# Patient Record
Sex: MALE | Race: WHITE | NOT HISPANIC OR LATINO | Employment: FULL TIME | URBAN - METROPOLITAN AREA
[De-identification: names, ages, dates, MRNs, and addresses within clinical notes are randomized per-mention and may not be internally consistent; named-entity substitution may affect disease eponyms.]

---

## 2017-01-20 ENCOUNTER — HOSPITAL ENCOUNTER (OUTPATIENT)
Dept: RADIOLOGY | Facility: MEDICAL CENTER | Age: 42
End: 2017-01-20
Attending: SURGERY
Payer: COMMERCIAL

## 2017-01-20 DIAGNOSIS — K43.9 OTHER VENTRAL HERNIA WITHOUT MENTION OF OBSTRUCTION OR GANGRENE: ICD-10-CM

## 2017-01-20 PROCEDURE — 74177 CT ABD & PELVIS W/CONTRAST: CPT

## 2017-01-24 ENCOUNTER — HOSPITAL ENCOUNTER (OUTPATIENT)
Dept: LAB | Facility: MEDICAL CENTER | Age: 42
End: 2017-01-24
Attending: INTERNAL MEDICINE
Payer: COMMERCIAL

## 2017-01-24 LAB
25(OH)D3 SERPL-MCNC: 22 NG/ML (ref 30–100)
ALBUMIN SERPL BCP-MCNC: 3.9 G/DL (ref 3.2–4.9)
ALBUMIN/GLOB SERPL: 1.3 G/DL
ALP SERPL-CCNC: 83 U/L (ref 30–99)
ALT SERPL-CCNC: 25 U/L (ref 2–50)
ANION GAP SERPL CALC-SCNC: 6 MMOL/L (ref 0–11.9)
APPEARANCE UR: CLEAR
AST SERPL-CCNC: 16 U/L (ref 12–45)
BASOPHILS # BLD AUTO: 0.06 K/UL (ref 0–0.12)
BASOPHILS NFR BLD AUTO: 0.8 % (ref 0–1.8)
BILIRUB SERPL-MCNC: 0.6 MG/DL (ref 0.1–1.5)
BILIRUB UR QL STRIP.AUTO: NEGATIVE
BUN SERPL-MCNC: 16 MG/DL (ref 8–22)
CALCIUM SERPL-MCNC: 9 MG/DL (ref 8.5–10.5)
CHLORIDE SERPL-SCNC: 106 MMOL/L (ref 96–112)
CO2 SERPL-SCNC: 30 MMOL/L (ref 20–33)
COLOR UR AUTO: YELLOW
CREAT SERPL-MCNC: 0.92 MG/DL (ref 0.5–1.4)
CREAT UR-MCNC: 214.2 MG/DL
CREAT UR-MCNC: 214.5 MG/DL
EOSINOPHIL # BLD: 0.1 K/UL (ref 0–0.51)
EOSINOPHIL NFR BLD AUTO: 1.4 % (ref 0–6.9)
EPITHELIAL CELLS 1715: ABNORMAL /HPF
ERYTHROCYTE [DISTWIDTH] IN BLOOD BY AUTOMATED COUNT: 51.9 FL (ref 35.9–50)
EST. AVERAGE GLUCOSE BLD GHB EST-MCNC: 123 MG/DL
FERRITIN SERPL-MCNC: 27.1 NG/ML (ref 22–322)
GLOBULIN SER CALC-MCNC: 3 G/DL (ref 1.9–3.5)
GLUCOSE SERPL-MCNC: 131 MG/DL (ref 65–99)
GLUCOSE UR STRIP.AUTO-MCNC: NEGATIVE MG/DL
HBA1C MFR BLD: 5.9 % (ref 0–5.6)
HCT VFR BLD AUTO: 62.8 % (ref 42–52)
HGB BLD-MCNC: 19.6 G/DL (ref 14–18)
HYALINE CAST   1831: ABNORMAL /LPF
IMM GRANULOCYTES # BLD AUTO: 0.02 K/UL (ref 0–0.11)
IMM GRANULOCYTES NFR BLD AUTO: 0.3 % (ref 0–0.9)
IRON SATN MFR SERPL: 17 % (ref 15–55)
IRON SERPL-MCNC: 78 UG/DL (ref 50–180)
KETONES UR STRIP.AUTO-MCNC: NEGATIVE MG/DL
LEUKOCYTE ESTERASE UR QL STRIP.AUTO: NEGATIVE
LYMPHOCYTES # BLD: 1.78 K/UL (ref 1–4.8)
LYMPHOCYTES NFR BLD AUTO: 24.2 % (ref 22–41)
MAGNESIUM SERPL-MCNC: 2 MG/DL (ref 1.5–2.5)
MCH RBC QN AUTO: 30.2 PG (ref 27–33)
MCHC RBC AUTO-ENTMCNC: 31.2 G/DL (ref 33.7–35.3)
MCV RBC AUTO: 96.6 FL (ref 81.4–97.8)
MICRO URNS: ABNORMAL
MICROALBUMIN UR-MCNC: 37 MG/DL
MICROALBUMIN/CREAT UR: 173 MG/G (ref 0–30)
MONOCYTES # BLD: 0.59 K/UL (ref 0–0.85)
MONOCYTES NFR BLD AUTO: 8 % (ref 0–13.4)
MUCOUS THREADS URNS QL MICRO: ABNORMAL /HPF
NEUTROPHILS # BLD: 4.82 K/UL (ref 1.82–7.42)
NEUTROPHILS NFR BLD AUTO: 65.3 % (ref 44–72)
NITRITE UR QL STRIP.AUTO: NEGATIVE
NRBC # BLD AUTO: 0 K/UL
NRBC BLD-RTO: 0 /100 WBC
PH UR: 6 [PH]
PHOSPHATE SERPL-MCNC: 4.2 MG/DL (ref 2.5–4.5)
PLATELET # BLD AUTO: 255 K/UL (ref 164–446)
PMV BLD AUTO: 10.8 FL (ref 9–12.9)
POTASSIUM SERPL-SCNC: 4.2 MMOL/L (ref 3.6–5.5)
PROT 24H UR-MRATE: 60.7 MG/DL (ref 0–15)
PROT SERPL-MCNC: 6.9 G/DL (ref 6–8.2)
PROT UR QL STRIP: 70 MG/DL
PROT/CREAT UR: 283 MG/G (ref 15–68)
PTH-INTACT SERPL-MCNC: 86.6 PG/ML (ref 14–72)
RBC # BLD AUTO: 6.5 M/UL (ref 4.7–6.1)
RBC #/AREA URNS HPF: ABNORMAL /HPF
RBC UR QL AUTO: NEGATIVE
SODIUM SERPL-SCNC: 142 MMOL/L (ref 135–145)
SP GR UR STRIP.AUTO: 1.02
TIBC SERPL-MCNC: 465 UG/DL (ref 250–450)
URATE SERPL-MCNC: 9.4 MG/DL (ref 2.5–8.3)
WBC # BLD AUTO: 7.4 K/UL (ref 4.8–10.8)
WBC #/AREA URNS HPF: ABNORMAL /HPF

## 2017-01-24 PROCEDURE — 84156 ASSAY OF PROTEIN URINE: CPT

## 2017-01-24 PROCEDURE — 84550 ASSAY OF BLOOD/URIC ACID: CPT

## 2017-01-24 PROCEDURE — 83970 ASSAY OF PARATHORMONE: CPT

## 2017-01-24 PROCEDURE — 82728 ASSAY OF FERRITIN: CPT

## 2017-01-24 PROCEDURE — 81001 URINALYSIS AUTO W/SCOPE: CPT

## 2017-01-24 PROCEDURE — 83036 HEMOGLOBIN GLYCOSYLATED A1C: CPT

## 2017-01-24 PROCEDURE — 80053 COMPREHEN METABOLIC PANEL: CPT

## 2017-01-24 PROCEDURE — 83735 ASSAY OF MAGNESIUM: CPT

## 2017-01-24 PROCEDURE — 36415 COLL VENOUS BLD VENIPUNCTURE: CPT

## 2017-01-24 PROCEDURE — 85025 COMPLETE CBC W/AUTO DIFF WBC: CPT

## 2017-01-24 PROCEDURE — 83540 ASSAY OF IRON: CPT

## 2017-01-24 PROCEDURE — 82306 VITAMIN D 25 HYDROXY: CPT

## 2017-01-24 PROCEDURE — 83550 IRON BINDING TEST: CPT

## 2017-01-24 PROCEDURE — 84100 ASSAY OF PHOSPHORUS: CPT

## 2017-01-24 PROCEDURE — 82043 UR ALBUMIN QUANTITATIVE: CPT

## 2017-01-24 PROCEDURE — 82570 ASSAY OF URINE CREATININE: CPT

## 2017-01-24 PROCEDURE — 80061 LIPID PANEL: CPT

## 2017-01-25 LAB
CHOLEST SERPL-MCNC: 170 MG/DL (ref 100–199)
HDLC SERPL-MCNC: 34 MG/DL
LDLC SERPL CALC-MCNC: 120 MG/DL
TRIGL SERPL-MCNC: 78 MG/DL (ref 0–149)

## 2017-02-03 DIAGNOSIS — Z01.810 PRE-OPERATIVE CARDIOVASCULAR EXAMINATION: ICD-10-CM

## 2017-02-03 DIAGNOSIS — Z01.812 PRE-OPERATIVE LABORATORY EXAMINATION: ICD-10-CM

## 2017-02-03 LAB — EKG IMPRESSION: NORMAL

## 2017-02-08 ENCOUNTER — HOSPITAL ENCOUNTER (OUTPATIENT)
Facility: MEDICAL CENTER | Age: 42
End: 2017-02-09
Attending: SURGERY | Admitting: SURGERY
Payer: COMMERCIAL

## 2017-02-08 DIAGNOSIS — R09.02 HYPOXIA: ICD-10-CM

## 2017-02-08 DIAGNOSIS — K43.9 OTHER VENTRAL HERNIA WITHOUT MENTION OF OBSTRUCTION OR GANGRENE: ICD-10-CM

## 2017-02-08 LAB — GLUCOSE BLD-MCNC: 138 MG/DL (ref 65–99)

## 2017-02-08 PROCEDURE — 110371 HCHG SHELL REV 272: Performed by: SURGERY

## 2017-02-08 PROCEDURE — 160009 HCHG ANES TIME/MIN: Performed by: SURGERY

## 2017-02-08 PROCEDURE — 502704 HCHG DEVICE, LIGASURE IMPACT: Performed by: SURGERY

## 2017-02-08 PROCEDURE — 700111 HCHG RX REV CODE 636 W/ 250 OVERRIDE (IP)

## 2017-02-08 PROCEDURE — 88305 TISSUE EXAM BY PATHOLOGIST: CPT | Mod: 59

## 2017-02-08 PROCEDURE — 110382 HCHG SHELL REV 271: Performed by: SURGERY

## 2017-02-08 PROCEDURE — 160036 HCHG PACU - EA ADDL 30 MINS PHASE I: Performed by: SURGERY

## 2017-02-08 PROCEDURE — 700101 HCHG RX REV CODE 250

## 2017-02-08 PROCEDURE — 160002 HCHG RECOVERY MINUTES (STAT): Performed by: SURGERY

## 2017-02-08 PROCEDURE — 160029 HCHG SURGERY MINUTES - 1ST 30 MINS LEVEL 4: Performed by: SURGERY

## 2017-02-08 PROCEDURE — A9270 NON-COVERED ITEM OR SERVICE: HCPCS

## 2017-02-08 PROCEDURE — 700102 HCHG RX REV CODE 250 W/ 637 OVERRIDE(OP): Performed by: CLINICAL NURSE SPECIALIST

## 2017-02-08 PROCEDURE — 500887 HCHG PACK, MAJOR BASIN: Performed by: SURGERY

## 2017-02-08 PROCEDURE — G0378 HOSPITAL OBSERVATION PER HR: HCPCS

## 2017-02-08 PROCEDURE — 160041 HCHG SURGERY MINUTES - EA ADDL 1 MIN LEVEL 4: Performed by: SURGERY

## 2017-02-08 PROCEDURE — A9270 NON-COVERED ITEM OR SERVICE: HCPCS | Performed by: CLINICAL NURSE SPECIALIST

## 2017-02-08 PROCEDURE — A4606 OXYGEN PROBE USED W OXIMETER: HCPCS | Performed by: SURGERY

## 2017-02-08 PROCEDURE — C1781 MESH (IMPLANTABLE): HCPCS | Performed by: SURGERY

## 2017-02-08 PROCEDURE — 82962 GLUCOSE BLOOD TEST: CPT

## 2017-02-08 PROCEDURE — 700102 HCHG RX REV CODE 250 W/ 637 OVERRIDE(OP)

## 2017-02-08 PROCEDURE — 501838 HCHG SUTURE GENERAL: Performed by: SURGERY

## 2017-02-08 PROCEDURE — 502240 HCHG MISC OR SUPPLY RC 0272: Performed by: SURGERY

## 2017-02-08 PROCEDURE — 160035 HCHG PACU - 1ST 60 MINS PHASE I: Performed by: SURGERY

## 2017-02-08 PROCEDURE — 160048 HCHG OR STATISTICAL LEVEL 1-5: Performed by: SURGERY

## 2017-02-08 DEVICE — IMPLANTABLE DEVICE: Type: IMPLANTABLE DEVICE | Status: FUNCTIONAL

## 2017-02-08 RX ORDER — CHLORHEXIDINE GLUCONATE ORAL RINSE 1.2 MG/ML
15 SOLUTION DENTAL EVERY 12 HOURS
Status: DISCONTINUED | OUTPATIENT
Start: 2017-02-08 | End: 2017-02-09

## 2017-02-08 RX ORDER — ACETAMINOPHEN 500 MG
1000 TABLET ORAL EVERY 6 HOURS
Status: DISCONTINUED | OUTPATIENT
Start: 2017-02-08 | End: 2017-02-09 | Stop reason: HOSPADM

## 2017-02-08 RX ORDER — SODIUM CHLORIDE, SODIUM LACTATE, POTASSIUM CHLORIDE, CALCIUM CHLORIDE 600; 310; 30; 20 MG/100ML; MG/100ML; MG/100ML; MG/100ML
1000 INJECTION, SOLUTION INTRAVENOUS
Status: COMPLETED | OUTPATIENT
Start: 2017-02-08 | End: 2017-02-08

## 2017-02-08 RX ORDER — DOCUSATE SODIUM 100 MG/1
100 CAPSULE, LIQUID FILLED ORAL 2 TIMES DAILY
Status: CANCELLED | OUTPATIENT
Start: 2017-02-08

## 2017-02-08 RX ORDER — OXYCODONE HYDROCHLORIDE 10 MG/1
10 TABLET ORAL
Status: DISCONTINUED | OUTPATIENT
Start: 2017-02-08 | End: 2017-02-09 | Stop reason: HOSPADM

## 2017-02-08 RX ORDER — ONDANSETRON 2 MG/ML
4 INJECTION INTRAMUSCULAR; INTRAVENOUS EVERY 4 HOURS PRN
Status: CANCELLED | OUTPATIENT
Start: 2017-02-08

## 2017-02-08 RX ORDER — CELECOXIB 200 MG/1
200 CAPSULE ORAL 2 TIMES DAILY WITH MEALS
Status: DISCONTINUED | OUTPATIENT
Start: 2017-02-08 | End: 2017-02-09 | Stop reason: HOSPADM

## 2017-02-08 RX ORDER — AMOXICILLIN 250 MG
1 CAPSULE ORAL NIGHTLY
Status: DISCONTINUED | OUTPATIENT
Start: 2017-02-08 | End: 2017-02-09 | Stop reason: HOSPADM

## 2017-02-08 RX ORDER — ACETAMINOPHEN 500 MG
1000 TABLET ORAL EVERY 6 HOURS
Status: CANCELLED | OUTPATIENT
Start: 2017-02-08 | End: 2017-02-13

## 2017-02-08 RX ORDER — CHLORHEXIDINE GLUCONATE ORAL RINSE 1.2 MG/ML
15 SOLUTION DENTAL EVERY 12 HOURS
Status: CANCELLED | OUTPATIENT
Start: 2017-02-08

## 2017-02-08 RX ORDER — DOCUSATE SODIUM 100 MG/1
100 CAPSULE, LIQUID FILLED ORAL 2 TIMES DAILY
Status: DISCONTINUED | OUTPATIENT
Start: 2017-02-08 | End: 2017-02-09 | Stop reason: HOSPADM

## 2017-02-08 RX ORDER — SODIUM CHLORIDE, SODIUM LACTATE, POTASSIUM CHLORIDE, CALCIUM CHLORIDE 600; 310; 30; 20 MG/100ML; MG/100ML; MG/100ML; MG/100ML
INJECTION, SOLUTION INTRAVENOUS CONTINUOUS
Status: DISCONTINUED | OUTPATIENT
Start: 2017-02-08 | End: 2017-02-09 | Stop reason: HOSPADM

## 2017-02-08 RX ORDER — OXYCODONE HCL 5 MG/5 ML
SOLUTION, ORAL ORAL
Status: COMPLETED
Start: 2017-02-08 | End: 2017-02-08

## 2017-02-08 RX ORDER — LIDOCAINE HYDROCHLORIDE 10 MG/ML
INJECTION, SOLUTION INFILTRATION; PERINEURAL
Status: COMPLETED
Start: 2017-02-08 | End: 2017-02-08

## 2017-02-08 RX ORDER — HYDROMORPHONE HYDROCHLORIDE 2 MG/ML
1 INJECTION, SOLUTION INTRAMUSCULAR; INTRAVENOUS; SUBCUTANEOUS
Status: DISCONTINUED | OUTPATIENT
Start: 2017-02-08 | End: 2017-02-09 | Stop reason: HOSPADM

## 2017-02-08 RX ORDER — OXYCODONE HYDROCHLORIDE 5 MG/1
5 TABLET ORAL
Status: DISCONTINUED | OUTPATIENT
Start: 2017-02-08 | End: 2017-02-09 | Stop reason: HOSPADM

## 2017-02-08 RX ORDER — CELECOXIB 200 MG/1
200 CAPSULE ORAL 2 TIMES DAILY WITH MEALS
Status: CANCELLED | OUTPATIENT
Start: 2017-02-08 | End: 2017-02-13

## 2017-02-08 RX ADMIN — FENTANYL CITRATE 25 MCG: 50 INJECTION, SOLUTION INTRAMUSCULAR; INTRAVENOUS at 14:45

## 2017-02-08 RX ADMIN — SODIUM CHLORIDE, SODIUM LACTATE, POTASSIUM CHLORIDE, CALCIUM CHLORIDE 1000 ML: 600; 310; 30; 20 INJECTION, SOLUTION INTRAVENOUS at 11:33

## 2017-02-08 RX ADMIN — FENTANYL CITRATE 25 MCG: 50 INJECTION, SOLUTION INTRAMUSCULAR; INTRAVENOUS at 15:00

## 2017-02-08 RX ADMIN — OXYCODONE HYDROCHLORIDE 10 MG: 10 TABLET ORAL at 21:55

## 2017-02-08 RX ADMIN — FENTANYL CITRATE 25 MCG: 50 INJECTION, SOLUTION INTRAMUSCULAR; INTRAVENOUS at 16:10

## 2017-02-08 RX ADMIN — OXYCODONE HYDROCHLORIDE 10 MG: 10 TABLET ORAL at 18:46

## 2017-02-08 RX ADMIN — LIDOCAINE HYDROCHLORIDE: 10 INJECTION, SOLUTION INFILTRATION; PERINEURAL at 11:00

## 2017-02-08 RX ADMIN — FENTANYL CITRATE 25 MCG: 50 INJECTION, SOLUTION INTRAMUSCULAR; INTRAVENOUS at 14:50

## 2017-02-08 RX ADMIN — CELECOXIB 200 MG: 200 CAPSULE ORAL at 18:46

## 2017-02-08 RX ADMIN — ACETAMINOPHEN 1000 MG: 500 TABLET, FILM COATED ORAL at 18:46

## 2017-02-08 RX ADMIN — OXYCODONE HYDROCHLORIDE 10 MG: 5 SOLUTION ORAL at 15:00

## 2017-02-08 ASSESSMENT — PAIN SCALES - GENERAL
PAINLEVEL_OUTOF10: 5
PAINLEVEL_OUTOF10: 0
PAINLEVEL_OUTOF10: 5
PAINLEVEL_OUTOF10: 7
PAINLEVEL_OUTOF10: 7
PAINLEVEL_OUTOF10: 5
PAINLEVEL_OUTOF10: 8
PAINLEVEL_OUTOF10: 7
PAINLEVEL_OUTOF10: 7
PAINLEVEL_OUTOF10: 1
PAINLEVEL_OUTOF10: 5
PAINLEVEL_OUTOF10: 7
PAINLEVEL_OUTOF10: 5
PAINLEVEL_OUTOF10: 7

## 2017-02-08 ASSESSMENT — LIFESTYLE VARIABLES
ON A TYPICAL DAY WHEN YOU DRINK ALCOHOL HOW MANY DRINKS DO YOU HAVE: 2
AVERAGE NUMBER OF DAYS PER WEEK YOU HAVE A DRINK CONTAINING ALCOHOL: 2
TOTAL SCORE: 0
CONSUMPTION TOTAL: NEGATIVE
TOTAL SCORE: 0
HAVE PEOPLE ANNOYED YOU BY CRITICIZING YOUR DRINKING: NO
TOTAL SCORE: 0
HOW MANY TIMES IN THE PAST YEAR HAVE YOU HAD 5 OR MORE DRINKS IN A DAY: 0
ALCOHOL_USE: YES
EVER_SMOKED: NEVER
EVER_SMOKED: NEVER
HAVE YOU EVER FELT YOU SHOULD CUT DOWN ON YOUR DRINKING: NO
EVER FELT BAD OR GUILTY ABOUT YOUR DRINKING: NO
EVER HAD A DRINK FIRST THING IN THE MORNING TO STEADY YOUR NERVES TO GET RID OF A HANGOVER: NO

## 2017-02-08 ASSESSMENT — COPD QUESTIONNAIRES
DO YOU EVER COUGH UP ANY MUCUS OR PHLEGM?: YES, A FEW DAYS A WEEK OR MONTH
COPD SCREENING SCORE: 2
HAVE YOU SMOKED AT LEAST 100 CIGARETTES IN YOUR ENTIRE LIFE: NO/DON'T KNOW
DURING THE PAST 4 WEEKS HOW MUCH DID YOU FEEL SHORT OF BREATH: SOME OF THE TIME

## 2017-02-08 NOTE — OP REPORT
DATE OF OPERATION: 2/8/2017     PREOPERATIVE DIAGNOSIS: Midline Ventral Hernia  Incarcerated    POSTOPERATIVE DIAGNOSIS: Incarcerated omentum, ischemic abdominal skin,  Ventral hernia    PROCEDURE:  Ventral Hernia Repair with Mesh, Omentectomy, abdominoplasty with resection of ischemic skin and sub-q    SURGEON: Daniel De Oliveira    ASSISTANT: ARUN Bell    ANESTHESIOLOGIST: Hesham Solomon    ANESTHESIA: General      INDICATIONS: The patient is a 41 y.o. male with a symptomatic ventral hernia.   Procedure, alternatives and risks were discussed with the patient or guardian.  We specifically discussed risk of bleeding, infection, injury to intestines, mesh erosion, hernia recurrence.  Questions were answered and they wished to proceed.    OPERATION:  The abdomen was prepped and draped in sterile fashion.  A midline   incision was made.  The incarcerated hernia was  from the   subcutaneous tissues and subsequently reduced after the incarcerated omentum was resected using Ligasure energy device.    The fascia was then elevated   and the preperitoneal space developed.  Once an adequate space had been   developed, a Ventralex mesh (Bard) was placed.  The mesh lay evenly and   flat against the fascia.  The fascia was then closed over the mesh using   interrupted 0 Mersilene suture.  The wound was irrigated.  There was no active   hemorrhage. After  skin from the hernia sac , a large portion was clearly ischemic .  This was resected back to healthy skin and subcutaneous tissue sharply.    Sponge and needle counts correct x2.  The subcutaneous tissues   were closed using interrupted 3-0 Vicryl and 2-0 in multiple layers. Skin was closed using a running 4-0   Monocryl subcuticular suture.  Patient tolerated the procedure well; was   taken to recovery room in stable condition.    ESTIMATED BLOOD LOSS:  Minimal.    SPECIMENS TO PATHOLOGY:  None.    CONDITION TO PAR:  Stable.        ____________________________________     DAVID HITCHCOCK MD        DD: 2/8/2017  2:34 PM

## 2017-02-08 NOTE — IP AVS SNAPSHOT
" Home Care Instructions                                                                                                                  Name:Lionel Zacarias  Medical Record Number:[id number]  CSN: [id number]    YOB: 1975   Age: 41 y.o.  Sex: male  HT:1.905 m (6' 3\") WT: 209.1 kg (460 lb 15.7 oz)          Admit Date: 2/8/2017     Discharge Date:   Today's Date: 2/9/2017  Attending Doctor:  Daniel De Oliveira M.D.                  Allergies:  Morphine            Discharge Instructions       Discharge Instructions    Discharged to home by car with relative. Discharged via wheelchair, hospital escort: Yes.  Special equipment needed: Oxygen    Be sure to schedule a follow-up appointment with your primary care doctor or any specialists as instructed.     Discharge Plan:   Diet Plan: Discussed  Activity Level: Discussed  Confirmed Follow up Appointment: Patient to Call and Schedule Appointment  Confirmed Symptoms Management: Discussed  Medication Reconciliation Updated: Yes  Influenza Vaccine Indication: Patient Refuses    I understand that a diet low in cholesterol, fat, and sodium is recommended for good health. Unless I have been given specific instructions below for another diet, I accept this instruction as my diet prescription.   Other diet: Regular diet as tolerated  Special Instructions:  Monitor for signs and symptoms of infection (fever, chills, nausea, vomiting)  Monitor incision for swelling, redness, or drainage    1.  Call or seek medical attention if questions or concerns arise   2.  Follow up with primary care provider within one weeks time for hypoxia and home oxygen use   3.  Follow up with Dr. Daniel De Oliveira, surgery, within one weeks time, as needed, if symptoms worsen and for wound check   4.  No contact sports, heavy lifting, or strenuous activities until cleared by outpatient provider   5. No swimming, hot tubs, baths or wound submersion. May shower   6.  No operation of machinery or motorized " vehicles under the influence of narcotics   7. No alcohol use under the influence of narcotics   8. Seek immediate medical attention for signs and/or symptoms of infection, respiratory decompensation, and/or   New or worsening abdominal pain    Laparoscopic Ventral Hernia Repair, Care After  Refer to this sheet in the next few weeks. These instructions provide you with information on caring for yourself after your procedure. Your caregiver may also give you more specific instructions. Your treatment has been planned according to current medical practices, but problems sometimes occur. Call your caregiver if you have any problems or questions after your procedure.   HOME CARE INSTRUCTIONS   · Only take over-the-counter or prescription medicines as directed by your caregiver. If antibiotic medicines are prescribed, take them as directed. Finish them even if you start to feel better.  · Always wash your hands before touching your abdomen.  · Take your bandages (dressings) off after 48 hours or as directed by your caregiver. You may have skin adhesive strips or skin glue over the surgical cuts (incisions). Do not take the strips off or peel the glue off. These will fall off on their own.  · Take showers once your caregiver approves. Until then, only take sponge baths. Do not take tub baths or go swimming until your caregiver approves.  · Check your incision area every day for swelling, redness, warmth, and blood or fluid leaking from the incision. These are signs of infection. Wash your hands before you check.  · Hold a pillow over your abdomen when you cough or sneeze to help ease pain.  · Eat foods that are high in fiber, such as whole grains, fruits, and vegetables. Fiber helps prevent difficult bowel movements (constipation).  · Drink enough fluids to keep your urine clear or pale yellow.  · Rest and lessen activity for 4-5 days after the surgery. You may take short walks if your caregiver approves. Do not drive  until approved by your caregiver.  · Do not lift anything heavy, participate in sports, or have sexual intercourse for 6-8 weeks or until your caregiver approves.    · Ask your caregiver when you can return to work.  · Keep all follow-up appointments.  SEEK MEDICAL CARE IF:   1. You have pain even after taking pain medicine.  2. You have not had a bowel movement in 3 days.  3. You have cramps or are nauseous.  SEEK IMMEDIATE MEDICAL CARE IF:   1. You have pain or swelling that is getting worse.  2. You have redness around your incisions.  3. Your incision is pulling apart.  4. You have blood or fluid leaking from your incisions.  5. You are vomiting.  6. You cannot pass urine.  MAKE SURE YOU:   1. Understand these instructions.  2. Will watch your condition.  3. Will get help right away if you are not doing well or get worse.     This information is not intended to replace advice given to you by your health care provider. Make sure you discuss any questions you have with your health care provider.     Document Released: 12/04/2013 Document Revised: 01/08/2016 Document Reviewed: 12/04/2013  [employer] Patient Education ©2016 [employer]      Depression / Suicide Risk    As you are discharged from this [facility] facility, it is important to learn how to keep safe from harming yourself.    Recognize the warning signs:  · Abrupt changes in personality, positive or negative- including increase in energy   · Giving away possessions  · Change in eating patterns- significant weight changes-  positive or negative  · Change in sleeping patterns- unable to sleep or sleeping all the time   · Unwillingness or inability to communicate  · Depression  · Unusual sadness, discouragement and loneliness  · Talk of wanting to die  · Neglect of personal appearance   · Rebelliousness- reckless behavior  · Withdrawal from people/activities they love  · Confusion- inability to concentrate     If you or a loved one observes  any of these behaviors or has concerns about self-harm, here's what you can do:  · Talk about it- your feelings and reasons for harming yourself  · Remove any means that you might use to hurt yourself (examples: pills, rope, extension cords, firearm)  · Get professional help from the community (Mental Health, Substance Abuse, psychological counseling)  · Do not be alone:Call your Safe Contact- someone whom you trust who will be there for you.  · Call your local CRISIS HOTLINE 583-6622 or 100-881-0868  · Call your local Children's Mobile Crisis Response Team [place] (305) 689-1239 or www.Compliance 360  · Call the toll free National Suicide Prevention Hotlines   · National Suicide Prevention Lifeline 131-642-FFGW (7807)  · [employer] Hope Line Network 800-SUICIDE (259-6362)          Follow-up Information     1. Follow up with Daniel De Oliveira M.D.. Go in 1 week.    Specialty:  Surgery    Why:  As needed, If symptoms worsen, For wound re-check    Contact information    [address] [facility] [address]  [address]  [place] [id number] [phone]          2. Follow up with Austen Saunders M.D..    Specialty:  Family Medicine    Why:  As needed, If symptoms worsen, and for hypoxia/home oxygen use    Contact information    [address] [place] [address] [address]  [address]  [employer] [place] [id number] [phone]           Discharge Medication Instructions:    Below are the medications your physician expects you to take upon discharge:    Review all your home medications and newly ordered medications with your doctor and/or pharmacist. Follow medication instructions as directed by your doctor and/or pharmacist.    Please keep your medication list with you and share with your physician.               Medication List      START taking these medications        Instructions    oxycodone immediate release 10 MG immediate release tablet   Last time this was given:  10 mg on 2/9/2017  1:55 PM   Commonly known as:  ROXICODONE    Take 1 Tab by mouth every four hours as needed  (Severe Pain (NRS Pain Scale 7-10; CPOT Pain Scale 6-8)).   Dose:  10 mg         CONTINUE taking these medications        Instructions    allopurinol 100 MG Tabs   Commonly known as:  ZYLOPRIM    Take 100 mg by mouth every day.   Dose:  100 mg       AMLODIPINE BESYLATE PO    Take 1 Tab by mouth every evening.   Dose:  1 Tab       LOSARTAN POTASSIUM PO    Take 1 Tab by mouth every day.   Dose:  1 Tab         STOP taking these medications     ADVIL PM PO       TYLENOL PM EXTRA STRENGTH  MG/30ML Liqd   Generic drug:  Diphenhydramine-APAP (sleep)               Instructions           Diet / Nutrition:    Follow any diet instructions given to you by your doctor or the dietician, including how much salt (sodium) you are allowed each day.    If you are overweight, talk to your doctor about a weight reduction plan.    Activity:    Remain physically active following your doctor's instructions about exercise and activity.    Rest often.     Any time you become even a little tired or short of breath, SIT DOWN and rest.    Worsening Symptoms:    Report any of the following signs and symptoms to the doctor's office immediately:    *Pain of jaw, arm, or neck  *Chest pain not relieved by medication                               *Dizziness or loss of consciousness  *Difficulty breathing even when at rest   *More tired than usual                                       *Bleeding drainage or swelling of surgical site  *Swelling of feet, ankles, legs or stomach                 *Fever (>100ºF)  *Pink or blood tinged sputum  *Weight gain (3lbs/day or 5lbs /week)           *Shock from internal defibrillator (if applicable)  *Palpitations or irregular heartbeats                *Cool and/or numb extremities    Stroke Awareness    Common Risk Factors for Stroke include:    Age  Atrial Fibrillation  Carotid Artery Stenosis  Diabetes Mellitus  Excessive alcohol consumption  High blood pressure  Overweight   Physical  inactivity  Smoking    Warning signs and symptoms of a stroke include:    *Sudden numbness or weakness of the face, arm or leg (especially on one side of the body).  *Sudden confusion, trouble speaking or understanding.  *Sudden trouble seeing in one or both eyes.  *Sudden trouble walking, dizziness, loss of balance or coordination.Sudden severe headache with no known cause.    It is very important to get treatment quickly when a stroke occurs. If you experience any of the above warning signs, call 911 immediately.                   Disclaimer         Quit Smoking / Tobacco Use:    I understand the use of any tobacco products increases my chance of suffering from future heart disease or stroke and could cause other illnesses which may shorten my life. Quitting the use of tobacco products is the single most important thing I can do to improve my health. For further information on smoking / tobacco cessation call a Toll Free Quit Line at 1-505.551.5574 (*National Cancer [place]) or 1-204.448.6569 (American Lung Association) or you can access the web based program at www.lungPeppercorn.org.    [place] Tobacco Users Help Line:  (535) 937-8936       Toll Free: 1-981.964.2433  Quit Tobacco Program [facility] (262)627-3723    Crisis Hotline:    [place] Crisis Hotline:  5-069-FEBSKWH or 1-780.112.4035    [place] Crisis Hotline:    1-748.867.2131 or 247-239-0458    Discharge Survey:   Thank you for choosing [facility]. We hope we did everything we could to make your hospital stay a pleasant one. You may be receiving a phone survey and we would appreciate your time and participation in answering the questions. Your input is very valuable to us in our efforts to improve our service to our patients and their families.        My signature on this form indicates that:    1. I have reviewed and understand the above information.  2. My questions regarding this information have been answered to my  satisfaction.  3. I have formulated a plan with my discharge nurse to obtain my prescribed medications for home.                  Disclaimer         __________________________________                     __________       ________                       Patient Signature                                                 Date                    Time

## 2017-02-08 NOTE — OR SURGEON
Immediate Post-Operative Note      PreOp Diagnosis: Ventral hernia    PostOp Diagnosis: Ventral hernia, incarcerated omentum    Procedure(s):  VENTRAL HERNIA REPAIR W/MESH - Wound Class: Clean  OMENTECTOMY - Wound Class: Clean    Surgeon(s):  Daniel De Oliveira M.D.    Anesthesiologist/Type of Anesthesia:  Anesthesiologist: Alfonso Solomon M.D./General    Surgical Staff:  Assistant: ROSITA Herrera  Circulator: Jina Cottrell  Scrub Person: Patricia Lord  Count [place]: Rosita Gooden R.N.    Specimen: Omentum     Estimated Blood Loss: Minimal    Findings: Incarcerated ventral hernia    Complications: None        2/8/2017 2:25 PM Khadra Bell

## 2017-02-08 NOTE — IP AVS SNAPSHOT
2/9/2017          Lionel Zacarias  [address] [address]    Dear Lionel:    [facility] wants to ensure your discharge home is safe and you or your loved ones have had all your questions answered regarding your care after you leave the hospital.    You may receive a telephone call within two days of your discharge.  This call is to make certain you understand your discharge instructions as well as ensure we provided you with the best care possible during your stay with us.     The call will only last approximately 3-5 minutes and will be done by a nurse.    Once again, we want to ensure your discharge home is safe and that you have a clear understanding of any next steps in your care.  If you have any questions or concerns, please do not hesitate to contact us, we are here for you.  Thank you for choosing [facility] for your healthcare needs.    Sincerely,    Chacorta Aly    [facility] [place]

## 2017-02-08 NOTE — IP AVS SNAPSHOT
Updox Access Code: [email or web address]  Expires: 3/3/2017 12:19 PM    Updox  A secure, online tool to manage your health information     [employer]’s Updox® is a secure, online tool that connects you to your personalized health information from the privacy of your home -- day or night - making it very easy for you to manage your healthcare. Once the activation process is completed, you can even access your medical information using the Updox myriam, which is available for free in the Apple Myriam store or Google Play store.     Updox provides the following levels of access (as shown below):   My Chart Features   [facility] Primary Care Doctor [facility]  Specialists [facility]  Urgent  Care Non-[facility]  Primary Care  Doctor   Email your healthcare team securely and privately 24/7 X X X X   Manage appointments: schedule your next appointment; view details of past/upcoming appointments X      Request prescription refills. X      View recent personal medical records, including lab and immunizations X X X X   View health record, including health history, allergies, medications X X X X   Read reports about your outpatient visits, procedures, consult and ER notes X X X X   See your discharge summary, which is a recap of your hospital and/or ER visit that includes your diagnosis, lab results, and care plan. X X       How to register for Updox:  1. Go to  [email or web address].  2. Click on the Sign Up Now box, which takes you to the New Member Sign Up page. You will need to provide the following information:  a. Enter your Updox Access Code exactly as it appears at the top of this page. (You will not need to use this code after you’ve completed the sign-up process. If you do not sign up before the expiration date, you must request a new code.)   b. Enter your date of birth.   c. Enter your home email address.   d. Click Submit, and follow the next screen’s instructions.  3. Create a Updox ID. This will be your Updox  login ID and cannot be changed, so think of one that is secure and easy to remember.  4. Create a [employer] password. You can change your password at any time.  5. Enter your Password Reset Question and Answer. This can be used at a later time if you forget your password.   6. Enter your e-mail address. This allows you to receive e-mail notifications when new information is available in [employer].  7. Click Sign Up. You can now view your health information.    For assistance activating your [employer] account, call [phone]

## 2017-02-08 NOTE — IP AVS SNAPSHOT
" <p align=\"LEFT\"><IMG SRC=\"//[email or web address]$/[email or web address]\" alt=\"Image\" WIDTH=\"50%\" HEIGHT=\"200\" BORDER=\"\"></p>                   Name:Lionel Zacarias  Medical Record Number:[id number]  CSN: [id number]    YOB: 1975   Age: 41 y.o.  Sex: male  HT:1.905 m (6' 3\") WT: 209.1 kg (460 lb 15.7 oz)          Admit Date: 2/8/2017     Discharge Date:   Today's Date: 2/9/2017  Attending Doctor:  Daniel De Oliveira M.D.                  Allergies:  Morphine          Follow-up Information     1. Follow up with Daniel De Oliveira M.D.. Go in 1 week.    Specialty:  Surgery    Why:  As needed, If symptoms worsen, For wound re-check    Contact information    [address] [facility] [address]  [address]  [place] [id number] [phone]          2. Follow up with Austen Saunders M.D..    Specialty:  Family Medicine    Why:  As needed, If symptoms worsen, and for hypoxia/home oxygen use    Contact information    [address] [place] [address] [address]  [address]  [place] [id number]  [phone]           Medication List      Take these Medications        Instructions    allopurinol 100 MG Tabs   Commonly known as:  ZYLOPRIM    Take 100 mg by mouth every day.   Dose:  100 mg       AMLODIPINE BESYLATE PO    Take 1 Tab by mouth every evening.   Dose:  1 Tab       LOSARTAN POTASSIUM PO    Take 1 Tab by mouth every day.   Dose:  1 Tab       oxycodone immediate release 10 MG immediate release tablet   Commonly known as:  ROXICODONE    Take 1 Tab by mouth every four hours as needed (Severe Pain (NRS Pain Scale 7-10; CPOT Pain Scale 6-8)).   Dose:  10 mg         "

## 2017-02-09 VITALS
HEART RATE: 73 BPM | DIASTOLIC BLOOD PRESSURE: 53 MMHG | SYSTOLIC BLOOD PRESSURE: 100 MMHG | TEMPERATURE: 97 F | HEIGHT: 75 IN | WEIGHT: 315 LBS | RESPIRATION RATE: 18 BRPM | OXYGEN SATURATION: 82 % | BODY MASS INDEX: 39.17 KG/M2

## 2017-02-09 PROBLEM — R09.02 HYPOXIA: Status: ACTIVE | Noted: 2017-02-09

## 2017-02-09 LAB
GLUCOSE BLD-MCNC: 121 MG/DL (ref 65–99)
GLUCOSE BLD-MCNC: 138 MG/DL (ref 65–99)

## 2017-02-09 PROCEDURE — 700102 HCHG RX REV CODE 250 W/ 637 OVERRIDE(OP): Performed by: CLINICAL NURSE SPECIALIST

## 2017-02-09 PROCEDURE — 700111 HCHG RX REV CODE 636 W/ 250 OVERRIDE (IP): Performed by: CLINICAL NURSE SPECIALIST

## 2017-02-09 PROCEDURE — A9270 NON-COVERED ITEM OR SERVICE: HCPCS | Performed by: CLINICAL NURSE SPECIALIST

## 2017-02-09 PROCEDURE — 82962 GLUCOSE BLOOD TEST: CPT

## 2017-02-09 PROCEDURE — G0378 HOSPITAL OBSERVATION PER HR: HCPCS

## 2017-02-09 PROCEDURE — 700112 HCHG RX REV CODE 229: Performed by: CLINICAL NURSE SPECIALIST

## 2017-02-09 RX ORDER — OXYCODONE HYDROCHLORIDE 10 MG/1
10 TABLET ORAL EVERY 4 HOURS PRN
Qty: 30 TAB | Refills: 0 | Status: SHIPPED | OUTPATIENT
Start: 2017-02-09 | End: 2017-06-23

## 2017-02-09 RX ADMIN — ACETAMINOPHEN 1000 MG: 500 TABLET, FILM COATED ORAL at 00:08

## 2017-02-09 RX ADMIN — CELECOXIB 200 MG: 200 CAPSULE ORAL at 09:27

## 2017-02-09 RX ADMIN — ENOXAPARIN SODIUM 40 MG: 100 INJECTION SUBCUTANEOUS at 09:28

## 2017-02-09 RX ADMIN — OXYCODONE HYDROCHLORIDE 5 MG: 10 TABLET ORAL at 05:21

## 2017-02-09 RX ADMIN — ACETAMINOPHEN 1000 MG: 500 TABLET, FILM COATED ORAL at 11:28

## 2017-02-09 RX ADMIN — OXYCODONE HYDROCHLORIDE 10 MG: 10 TABLET ORAL at 09:28

## 2017-02-09 RX ADMIN — OXYCODONE HYDROCHLORIDE 10 MG: 10 TABLET ORAL at 13:55

## 2017-02-09 RX ADMIN — DOCUSATE SODIUM 100 MG: 100 CAPSULE ORAL at 09:27

## 2017-02-09 RX ADMIN — ACETAMINOPHEN 1000 MG: 500 TABLET, FILM COATED ORAL at 05:19

## 2017-02-09 RX ADMIN — SODIUM CHLORIDE, POTASSIUM CHLORIDE, SODIUM LACTATE AND CALCIUM CHLORIDE: 600; 310; 30; 20 INJECTION, SOLUTION INTRAVENOUS at 00:12

## 2017-02-09 ASSESSMENT — ENCOUNTER SYMPTOMS
ABDOMINAL PAIN: 1
VOMITING: 0
SHORTNESS OF BREATH: 1
SPEECH CHANGE: 0
FEVER: 0
ROS GI COMMENTS: INCISIONAL TENDERNESS
CHILLS: 0
NAUSEA: 0

## 2017-02-09 ASSESSMENT — PAIN SCALES - GENERAL
PAINLEVEL_OUTOF10: 4
PAINLEVEL_OUTOF10: 2
PAINLEVEL_OUTOF10: 4

## 2017-02-09 NOTE — PROGRESS NOTES
"  Trauma/Surgical Progress Note    Author: Tiburcio Chi Date & Time created: 2/9/2017   11:11 AM     Interval Events:    S/P Ventral hernia repair with mesh, omentectomy, abdominoplasty with resection of ischemic skin   Post operative incision intact. Adequate pain control. Tolerating oral diet. Ambulatory.  Hypoxic, requiring supplemental oxygen. Arrange home oxygen   Counseled    Review of Systems   Constitutional: Negative for fever and chills.   Respiratory: Positive for shortness of breath.         Supplemental oxygen    Gastrointestinal: Positive for abdominal pain. Negative for nausea and vomiting.        Incisional tenderness   Genitourinary: Negative for dysuria.   Neurological: Negative for speech change.     Hemodynamics:  Blood pressure 106/62, pulse 68, temperature 36.2 °C (97.2 °F), resp. rate 18, height 1.905 m (6' 3\"), weight 209.1 kg (460 lb 15.7 oz), SpO2 99 %.     Respiratory:    Respiration: 18, Pulse Oximetry: 99 %, O2 Daily Delivery Respiratory : OxyMask     Work Of Breathing / Effort: Mild  RUL Breath Sounds: Clear, RML Breath Sounds: Clear, RLL Breath Sounds: Diminished, JEET Breath Sounds: Clear, LLL Breath Sounds: Diminished  Fluids:    Intake/Output Summary (Last 24 hours) at 02/09/17 1111  Last data filed at 02/09/17 0800   Gross per 24 hour   Intake   3342 ml   Output    600 ml   Net   2742 ml     Admit Weight: (!) 215.2 kg (474 lb 6.9 oz)  Current     Physical Exam   Constitutional: He is oriented to person, place, and time. He appears well-developed and well-nourished. No distress.   HENT:   Head: Normocephalic.   Eyes: Pupils are equal, round, and reactive to light.   Cardiovascular: Normal rate.    Pulmonary/Chest: He exhibits no tenderness.   Hypoxia, requiring supplemental oxygen     Abdominal:   Obese  Post operative incision intact, abdominal binder   Musculoskeletal:   Ambulatory    Neurological: He is alert and oriented to person, place, and time.   Skin: Skin is warm and dry. "   Nursing note and vitals reviewed.      Medical Decision Making/Problem List:    Active Hospital Problems    Diagnosis   • Hypoxia [R09.02]   • Other ventral hernia without mention of obstruction or gangrene [K43.9]     Core Measures & Quality Metrics:  Labs reviewed, Medications reviewed and Radiology images reviewed  Giron catheter: No Giron      DVT Prophylaxis: Enoxaparin (Lovenox)  DVT prophylaxis - mechanical: Not indicated at this time, ambulatory      Assessed for rehab: Patient returned to prior level of function, rehabilitation not indicated at this time    WENDI Score  Discussed patient condition with RN, Patient and trauma surgery, Dr. Daniel De Olievira  .

## 2017-02-09 NOTE — DISCHARGE PLANNING
Received DME choice from [facility]) at 1215.  DME referral for oxygen sent to [facility] at 1220.

## 2017-02-09 NOTE — FACE TO FACE
Face to Face Note  -  Durable Medical Equipment    ROSITA Noriega - NPI: [id number]  I certify that this patient is under my care and that they had a durable medical equipment(DME)face to face encounter by myself that meets the physician DME face-to-face encounter requirements with this patient on:    Date of encounter:   Patient:                    MRN:                       YOB: 2019  Lionel Zacarias  [id number]  1975     The encounter with the patient was in whole, or in part, for the following medical condition, which is the primary reason for durable medical equipment:  Post-Op Surgery    I certify that, based on my findings, the following durable medical equipment is medically necessary:  Oxygen.    HOME O2 Saturation Measurements:(Values must be present for Home Oxygen orders)  Room air sat at rest: 83  Room air sat with amb: 71  With liters of O2: 4, O2 sat at rest with O2: 93  With Liters of O2: 5, O2 sat with amb with O2 : 88  Is the patient mobile?: Yes    My Clinical findings support the need for the above equipment due to:  Hypoxia, Wound/Incision    Supporting Symptoms:

## 2017-02-09 NOTE — PROGRESS NOTES
Discharging Patient home per physician order.  Discharged with Family.  Demonstrated understanding of discharge instructions, follow up appointments, home medications, prescriptions, home care for surgical wound and nursing care instructions.  Ambulating with standby assistance, voiding without difficulty, pain well controlled, tolerating oral medications, home oxygen ordered, pt educated on home oxygen use, tolerating diet.  Educational handouts given and discussed.  Verbalized understanding of discharge instructions and educational handouts.  All questions answered.  Belongings with patient at time of discharge.

## 2017-02-09 NOTE — DIETARY
NUTRITION SERVICES: BMI - Pt with BMI >40 (=57.62 kg/m2). Weight loss counseling not appropriate in acute care setting. RECOMMEND - Referral to outpatient nutrition services for weight management after D/C.

## 2017-02-09 NOTE — PROGRESS NOTES
Assumed care at 0700. Received report from night shift RN. Bedside report completed. AOx4.  C/o pain-medicated.  Denies nausea. Abd binder in place. Tolerating diet. +voiding.  IVF infusing. Ambulating with standby assist. Pt call light and belongings within reach, fall precautions in place.

## 2017-02-09 NOTE — CARE PLAN
Problem: Venous Thromboembolism (VTW)/Deep Vein Thrombosis (DVT) Prevention:  Goal: Patient will participate in Venous Thrombosis (VTE)/Deep Vein Thrombosis (DVT)Prevention Measures  Outcome: PROGRESSING AS EXPECTED  SCDs in place. Lovenox is ordered.

## 2017-02-09 NOTE — CARE PLAN
Problem: Respiratory:  Goal: Respiratory status will improve  Outcome: PROGRESSING SLOWER THAN EXPECTED  Pt has to wear 5L O2 via oxy-mask when pt is sleeping due to sleep apnea. O2 sat monitor in place.

## 2017-02-09 NOTE — DISCHARGE PLANNING
Received message from [facility] that [facility] is currently evaluating with the pts insurance and will contact once scheduled for delivery.

## 2017-02-09 NOTE — DISCHARGE PLANNING
BIB self for surgery. He may need oxygen for dc home and outpt sleep study for sleep apnea. Anticipate dc home when medically cleared

## 2017-02-09 NOTE — DISCHARGE INSTRUCTIONS
Discharge Instructions    Discharged to home by car with relative. Discharged via wheelchair, hospital escort: Yes.  Special equipment needed: Oxygen    Be sure to schedule a follow-up appointment with your primary care doctor or any specialists as instructed.     Discharge Plan:   Diet Plan: Discussed  Activity Level: Discussed  Confirmed Follow up Appointment: Patient to Call and Schedule Appointment  Confirmed Symptoms Management: Discussed  Medication Reconciliation Updated: Yes  Influenza Vaccine Indication: Patient Refuses    I understand that a diet low in cholesterol, fat, and sodium is recommended for good health. Unless I have been given specific instructions below for another diet, I accept this instruction as my diet prescription.   Other diet: Regular diet as tolerated  Special Instructions:  Monitor for signs and symptoms of infection (fever, chills, nausea, vomiting)  Monitor incision for swelling, redness, or drainage    1.  Call or seek medical attention if questions or concerns arise   2.  Follow up with primary care provider within one weeks time for hypoxia and home oxygen use   3.  Follow up with Dr. Daniel De Oliveira, surgery, within one weeks time, as needed, if symptoms worsen and for wound check   4.  No contact sports, heavy lifting, or strenuous activities until cleared by outpatient provider   5. No swimming, hot tubs, baths or wound submersion. May shower   6.  No operation of machinery or motorized vehicles under the influence of narcotics   7. No alcohol use under the influence of narcotics   8. Seek immediate medical attention for signs and/or symptoms of infection, respiratory decompensation, and/or   New or worsening abdominal pain    Laparoscopic Ventral Hernia Repair, Care After  Refer to this sheet in the next few weeks. These instructions provide you with information on caring for yourself after your procedure. Your caregiver may also give you more specific instructions. Your  treatment has been planned according to current medical practices, but problems sometimes occur. Call your caregiver if you have any problems or questions after your procedure.   HOME CARE INSTRUCTIONS   · Only take over-the-counter or prescription medicines as directed by your caregiver. If antibiotic medicines are prescribed, take them as directed. Finish them even if you start to feel better.  · Always wash your hands before touching your abdomen.  · Take your bandages (dressings) off after 48 hours or as directed by your caregiver. You may have skin adhesive strips or skin glue over the surgical cuts (incisions). Do not take the strips off or peel the glue off. These will fall off on their own.  · Take showers once your caregiver approves. Until then, only take sponge baths. Do not take tub baths or go swimming until your caregiver approves.  · Check your incision area every day for swelling, redness, warmth, and blood or fluid leaking from the incision. These are signs of infection. Wash your hands before you check.  · Hold a pillow over your abdomen when you cough or sneeze to help ease pain.  · Eat foods that are high in fiber, such as whole grains, fruits, and vegetables. Fiber helps prevent difficult bowel movements (constipation).  · Drink enough fluids to keep your urine clear or pale yellow.  · Rest and lessen activity for 4-5 days after the surgery. You may take short walks if your caregiver approves. Do not drive until approved by your caregiver.  · Do not lift anything heavy, participate in sports, or have sexual intercourse for 6-8 weeks or until your caregiver approves.    · Ask your caregiver when you can return to work.  · Keep all follow-up appointments.  SEEK MEDICAL CARE IF:   1. You have pain even after taking pain medicine.  2. You have not had a bowel movement in 3 days.  3. You have cramps or are nauseous.  SEEK IMMEDIATE MEDICAL CARE IF:   1. You have pain or swelling that is getting  worse.  2. You have redness around your incisions.  3. Your incision is pulling apart.  4. You have blood or fluid leaking from your incisions.  5. You are vomiting.  6. You cannot pass urine.  MAKE SURE YOU:   1. Understand these instructions.  2. Will watch your condition.  3. Will get help right away if you are not doing well or get worse.     This information is not intended to replace advice given to you by your health care provider. Make sure you discuss any questions you have with your health care provider.     Document Released: 12/04/2013 Document Revised: 01/08/2016 Document Reviewed: 12/04/2013  [employer] Interactive Patient Education ©2016 [employer]      Depression / Suicide Risk    As you are discharged from this [facility] facility, it is important to learn how to keep safe from harming yourself.    Recognize the warning signs:  · Abrupt changes in personality, positive or negative- including increase in energy   · Giving away possessions  · Change in eating patterns- significant weight changes-  positive or negative  · Change in sleeping patterns- unable to sleep or sleeping all the time   · Unwillingness or inability to communicate  · Depression  · Unusual sadness, discouragement and loneliness  · Talk of wanting to die  · Neglect of personal appearance   · Rebelliousness- reckless behavior  · Withdrawal from people/activities they love  · Confusion- inability to concentrate     If you or a loved one observes any of these behaviors or has concerns about self-harm, here's what you can do:  · Talk about it- your feelings and reasons for harming yourself  · Remove any means that you might use to hurt yourself (examples: pills, rope, extension cords, firearm)  · Get professional help from the community (Mental Health, Substance Abuse, psychological counseling)  · Do not be alone:Call your Safe Contact- someone whom you trust who will be there for you.  · Call your local CRISIS HOTLINE 651-1238 or  456-389-8104  · Call your local Children's Mobile Crisis Response Team [place] (382) 672-0439 or www.[employer]HearToday.Org  · Call the toll free National Suicide Prevention Hotlines   · National Suicide Prevention Lifeline 643-014-YXFY (4628)  · National [employer] Line Network 800-SUICIDE (194-1113)

## 2017-02-09 NOTE — DISCHARGE PLANNING
Received message that pt will require home O2 for DC. Met with the pt at the bedside, choice form provided and choice faxed to [facility].

## 2017-02-09 NOTE — PROGRESS NOTES
Received report from day shift RN. Pt A&O x 4. C/o pain 7/10. Medicated per MAR. No c/o nausea and vomiting at this time. Pt is tolerating full liquid diet. -BM since pt arrived on unit. BS x 4 normoactive. Midline surgical incision open to air with dermabond, no infection sign noted. Abdominal binder in place. SCDs in place. Pt on bariatric bed. Call light within reach. Bed locked and in lowest position. Plan of care discussed with pt. All needs are met at this time.    Pt educated regarding fall risk and intervention. Pt verbalized understanding and still refusing bed alarm. Pt A&O x4. Pt demonstrates appropriate use of call light to call for assistance. Hourly rounding in place.

## 2017-02-10 NOTE — DISCHARGE SUMMARY
DATE OF ADMISSION:  02/08/2017.    DATE OF DISCHARGE:  02/09/2017.    LENGTH OF STAY:  One day.    DISCHARGE DIAGNOSES:  1.  Ventral hernia without mention of obstruction or gangrene.  2.  Hypoxia.    PROCEDURES: 02/08/2017, procedure performed by Dr. Daniel De Oliveira.   - Ventral hernia repair with mesh omentectomy, abdominoplasty with section   of ischemic skin and subQ.    HISTORY OF PRESENT ILLNESS:  The patient is a 41-year-old male with a   symptomatic ventral hernia.  He proceeded to the hospital on 02/08/2017 for   elective repair.    HOSPITAL COURSE:  The patient underwent a ventral hernia repair with mesh,   omentumectomy, abdominoplasty resection of ischemic skin in subQ on   02/08/2017.  Postoperatively, he did well.  Currently, he is tolerating an   oral diet, his incision is clean, dry, and intact with an abdominal binder in place and  he is ambulatory.    The patient's admission weight was 209.1 kilograms.  He experience postoperative   Hypoxia. However, there may have been a chronic component, given his obesity.    He will be going home on continuous oxygen at 4 liters and he will follow with his   primary care provider.     DISCHARGE PHYSICAL EXAM:  Please see exam dated 02/09/2017.    DISCHARGE MEDICATIONS:  Oxycodone (Roxicodone) 10 mg immediate release,   take 1tablet by mouth every 4 hours as needed for pain, dispensed 30 tablets,   refills 0.    DISPOSITION:  The patient will be discharged home in good condition on   02/09/2017.  He will follow up with his primary care provider, Dr. Austen Saunders for his hypoxia and home oxygen needs.  He will follow with Dr. Daniel De Oliveira, surgery, within 1 week's time as needed and if symptoms worsen   and for wound check, within 1 week's time.  He has been extensively counseled.    All of his questions had been answered.  Special attention was paid to the   signs and symptoms of infection and new or worsening abdominal pain and   respiratory  decompensation and to seek immediate medical attention if these do   develop.  He demonstrates understanding of his discharge instructions and   gives verbal compliance.    Time spent on discharge 45 minutes.       ____________________________________     EDY PUENTES / ROBERTO    DD:  02/09/2017 11:35:22  DT:  02/10/2017 05:26:18    D#:  [id number]  Job#:  [id number]    cc: SAI HENSLEY MD

## 2017-05-01 ENCOUNTER — SLEEP CENTER VISIT (OUTPATIENT)
Dept: SLEEP MEDICINE | Facility: MEDICAL CENTER | Age: 42
End: 2017-05-01
Payer: COMMERCIAL

## 2017-05-01 VITALS
OXYGEN SATURATION: 82 % | HEIGHT: 75 IN | BODY MASS INDEX: 39.17 KG/M2 | WEIGHT: 315 LBS | DIASTOLIC BLOOD PRESSURE: 78 MMHG | SYSTOLIC BLOOD PRESSURE: 130 MMHG | HEART RATE: 96 BPM | RESPIRATION RATE: 18 BRPM

## 2017-05-01 DIAGNOSIS — E66.01 MORBID OBESITY DUE TO EXCESS CALORIES (HCC): ICD-10-CM

## 2017-05-01 DIAGNOSIS — R09.02 HYPOXIA: ICD-10-CM

## 2017-05-01 DIAGNOSIS — G47.33 OBSTRUCTIVE SLEEP APNEA: ICD-10-CM

## 2017-05-01 PROCEDURE — 99204 OFFICE O/P NEW MOD 45 MIN: CPT | Performed by: INTERNAL MEDICINE

## 2017-05-01 RX ORDER — FUROSEMIDE 40 MG/1
40 TABLET ORAL 2 TIMES DAILY
COMMUNITY
Start: 2017-04-10

## 2017-05-01 NOTE — PATIENT INSTRUCTIONS
1. We have scheduled a sleep study  2. We have scheduled an echocardiogram  3. We have scheduled a chest x-ray and pulmonary function testing  4. We have made a referral to a nutritionist  5. We have prescribed oxygen therapy  6. Recommend follow-up after the above testing

## 2017-05-01 NOTE — PROGRESS NOTES
Lionel Zacarias is a 41 y.o. male here for obstructive sleep apnea.  Patient was referred by Dr. Austen Peralta.    History of Present Illness:    The patient is a 41-year-old male with morbid obesity. He comes in for evaluation of possible sleep apnea. He has a brother with sleep apnea and is on a CPAP machine. The patient has gained a significant amount of weight and is currently up to 481 pounds. His history of low oxygen. After hernia surgery in February his oxygen levels were too low and he was admitted to the hospital. He was sent home the next day with oxygen therapy but the patient says he never using oxygen. 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 He says he is very sleepy and he has trouble maintaining his focus and memory. He snores and he stops breathing in his sleep. Sometimes he wakes up with sweats and he has significant nocturia. When he gets up in the morning he is exhausted and tired and he will fall sleep and many sedentary situations. Family members and partners have witnesses apneas. The patient is trying to work on weight loss. He is been evaluated for bariatric surgery but apparently his insurance company will not cover the cost of this procedure. The patient has hypertension. He has no history of heart disease although has chronic lower extremity edema. He also has renal insufficiency. He's never had a heart attack or congestive heart failure per his knowledge. He is a nonsmoker and does not do any recreational drugs. He denies use.    Constitutional:  Negative for fever, chills, sweats, and fatigue.  Eyes:  Negative for eye pain and visual changes.  HENT:  Negative for tinnitus and hoarse voice.  Cardiovascular:  Negative for chest pain, leg swelling, syncope and orthopnea.  Respiratory:  See HPI for pertinent negatives  Sleep:  Positive for somnolence, loud snoring, sleep disturbance due to breathing,  insomnia.  Gastrointestinal:  Negative for dysphagia, nausea and abdominal pain.  Heme/lymph:  Denies easy bruising, blood clots.  Musculoskeletal:  Negative for arthralgias, sore muscles and back pain.  Skin:  Negative for rash and color change.  Neurological:  Negative for headaches, lightheadedness and weakness.  Psychiatric:  Denies depression.    Current Outpatient Prescriptions   Medication Sig Dispense Refill   • furosemide (LASIX) 40 MG Tab      • AMLODIPINE BESYLATE PO Take 1 Tab by mouth every evening.     • LOSARTAN POTASSIUM PO Take 1 Tab by mouth every day.     • allopurinol (ZYLOPRIM) 100 MG Tab Take 100 mg by mouth every day.     • oxycodone immediate release (ROXICODONE) 10 MG immediate release tablet Take 1 Tab by mouth every four hours as needed (Severe Pain (NRS Pain Scale 7-10; CPOT Pain Scale 6-8)). 30 Tab 0     No current facility-administered medications for this visit.       Social History   Substance Use Topics   • Smoking status: Never Smoker    • Smokeless tobacco: Current User     Types: Chew   • Alcohol Use: 3.0 oz/week     6 Cans of beer per week      Comment: 2 per wek       Past Medical History   Diagnosis Date   • GOUT 12/2/2010   • Snoring    • Arthritis      joints   • HTN (hypertension) 12/2/2010 2017 pt states fairly well controlled   • Hypertension        Past Surgical History   Procedure Laterality Date   • Other orthopedic surgery       r hip   • Ventral hernia repair N/A 2/8/2017     Procedure: VENTRAL HERNIA REPAIR W/MESH;  Surgeon: Daniel De Oliveira M.D.;  Location: SURGERY [facility];  Service:    • Omentectomy N/A 2/8/2017     Procedure: OMENTECTOMY;  Surgeon: Daniel De Oliveira M.D.;  Location: SURGERY [facility];  Service:    • Hip replacement, total         Allergies:  Morphine    Family History   Problem Relation Age of Onset   • Heart Disease Mother 55     MI   • Lung Disease Maternal Grandmother      COPD   • Heart Disease Maternal Grandfather      MI   •  "Sleep Apnea Brother        Physical Examination    Filed Vitals:    05/01/17 1355   Height: 1.905 m (6' 3\")   Weight: 218.18 kg (481 lb)   Weight % change since last entry.: 0 %   BP: 130/78   Pulse: 96   BMI (Calculated): 60.12   Resp: 18   Neck circumference: 20       Physical Exam:  Constitutional:  Well developed and well nourished. Morbidly obese  Head:  Normocephalic and atraumatic.  Nose:  Nose normal.  Mouth/Throat:  Oropharynx is clear and moist, no lesions.  Mallampati class IV  Eyes:  Conjunctivae and EOM are normal.  Pupils are equal, round, and reactive to light.  Neck:  Normal range of motion.  Supple.  No JVD. No tracheal deviation.  No thyromegally  Cardiovascular:  Normal rate, regular rhythm, normal heart sounds and intact distal pulses. 2+ pitting edema with chronic venous stasis changes  Pulmonary/Chest:  No accessory muscle use.  No wheezing, rales or rhonchi.  No dullness to percussion, tenderness or deformity.  Abdominal:  Soft.  No ascites.  No Hepatosplenomegally.  Non tender.  Musculoskeletal.  Normal range of motion.  No muscular atrophy.  Lymphadenopathy:  No cervical or supraclavicular adenopathy  Neurological:  Alert and oriented.  Cranial nerves intact.  No focal deficits  Skin:  No rashes or ulcers.  Psyciatric:  Normal mood and affect.      Assessment and Plan:  1. Obstructive sleep apnea  The patient has signs and symptoms of severe obstructive sleep apnea. We will get him scheduled for a sleep study and I suspect he will need BiPAP therapy with oxygen. I'm also ordering an ABG to see if there is evidence of chronic hypercapnia.  - POLYSOMNOGRAPHY, 4 OR MORE; Future    2. Hypoxia  The patients chronic hypoxemia is probably related to his obesity but must also consider the possibility of pulmonary hypertension and untreated sleep apnea as additional etiologies. I have scheduled the patient for pulmonary function testing as well as a chest x-ray and we will do an echocardiogram. We " will set him up with oxygen therapy today before he leaves the office. He was encouraged to use the oxygen 24 hours a day including driving and while at work. The patient indicates that he is willing to comply with this request.  - DME O2 NEW SET UP  - AMB PULMONARY FUNCTION TEST/LAB; Future  - ARTERIAL BLOOD GAS; Future  - DX-CHEST-2 VIEWS; Future  - ECHOCARDIOGRAM COMP W/O CONT; Future    3. Morbid obesity due to excess calories (CMS-HCC)  Weight loss is strongly recommended. I have referred him to a dietitian. He will most likely benefit from bariatric surgery and we will continue to pursue this option.  - REFERRAL TO OTHER          Followup Return for follow up visit with Simba Foster MD, after sleep study, after testing, with PFT, with CXR.

## 2017-05-01 NOTE — MR AVS SNAPSHOT
"        Lionel Greeneidge   2017 2:00 PM   Sleep Center Visit   MRN: [id number]    Department:  [facility]   Dept Phone:  [phone]    Description:  Male : 1975   Provider:  Simba Foster M.D.           Reason for Visit     New Patient Sleep evaluation      Allergies as of 2017     Allergen Noted Reactions    Morphine 2017   Unspecified    Nightmares terrors       You were diagnosed with     Obstructive sleep apnea   [687486]       Hypoxia   [320730]       Morbid obesity due to excess calories (CMS-[facility])   [0921609]         Vital Signs     Blood Pressure Pulse Respirations Height Weight Body Mass Index    130/78 mmHg 96 18 1.905 m (6' 3\") 218.18 kg (481 lb) 60.12 kg/m2    Oxygen Saturation Smoking Status                82% Never Smoker           Basic Information     Date Of Birth Sex Race Ethnicity Preferred Language    1975 Male White Non- English      Your appointments     2017  9:00 PM   Sleep Study Diagnostic with SLEEP TECH   [facility] (--)    [address]  [place] [address]   [phone]            Jul 10, 2017  1:00 PM   Follow UP with Simba Foster M.D.   [facility] (--)    [address]  [place] [address]   [phone]              Problem List              ICD-10-CM Priority Class Noted - Resolved    HTN (hypertension) I10   2010 - Present    GOUT    2010 - Present    Other ventral hernia without mention of obstruction or gangrene K43.9   2017 - Present    Hypoxia R09.02   2017 - Present      Health Maintenance        Date Due Completion Dates    IMM DTaP/Tdap/Td Vaccine (1 - Tdap) 1992 ---            Current Immunizations     No immunizations on file.      Below and/or attached are the medications your provider expects you to take. Review all of your home medications and newly ordered medications with your provider and/or pharmacist. Follow " medication instructions as directed by your provider and/or pharmacist. Please keep your medication list with you and share with your provider. Update the information when medications are discontinued, doses are changed, or new medications (including over-the-counter products) are added; and carry medication information at all times in the event of emergency situations     Allergies:  MORPHINE - Unspecified               Medications  Valid as of: May 01, 2017 -  2:55 PM    Generic Name Brand Name Tablet Size Instructions for use    Allopurinol (Tab) ZYLOPRIM 100 MG Take 100 mg by mouth every day.        AmLODIPine Besylate   Take 1 Tab by mouth every evening.        Furosemide (Tab) LASIX 40 MG         Losartan Potassium   Take 1 Tab by mouth every day.        OxyCODONE HCl (Tab) ROXICODONE 10 MG Take 1 Tab by mouth every four hours as needed (Severe Pain (NRS Pain Scale 7-10; CPOT Pain Scale 6-8)).        .                 Medicines prescribed today were sent to:     [facility] [address] [place] [address], [place] - [address] [facility]    [address] [facility] [place] [address]    Phone: [phone] Fax: [phone]    Open 24 Hours?: No      Medication refill instructions:       If your prescription bottle indicates you have medication refills left, it is not necessary to call your provider’s office. Please contact your pharmacy and they will refill your medication.    If your prescription bottle indicates you do not have any refills left, you may request refills at any time through one of the following ways: The online [employer] system (except Urgent Care), by calling your provider’s office, or by asking your pharmacy to contact your provider’s office with a refill request. Medication refills are processed only during regular business hours and may not be available until the next business day. Your provider may request additional information or to have a follow-up visit with you prior to refilling your medication.      *Please Note: Medication refills are assigned a new Rx number when refilled electronically. Your pharmacy may indicate that no refills were authorized even though a new prescription for the same medication is available at the pharmacy. Please request the medicine by name with the pharmacy before contacting your provider for a refill.        Your To Do List     Future Labs/Procedures Complete By Expires    AMB PULMONARY FUNCTION TEST/LAB  As directed 5/1/2018    Comments:    Please do pre and post, diffusion, lung volumes    ARTERIAL BLOOD GAS  As directed 5/1/2018    DX-CHEST-2 VIEWS  As directed 5/1/2018    ECHOCARDIOGRAM COMP W/O CONT  As directed 5/1/2018    Comments:    Dyspnea on exertion, eval for pulmonary hypertension    POLYSOMNOGRAPHY, 4 OR MORE  As directed 5/1/2018    Comments:    Please do split study if meets diagnostic criteria      Referral     A referral request has been sent to our patient care coordination department. Please allow 3-5 business days for us to process this request and contact you either by phone or mail. If you do not hear from us by the 5th business day, please call us at [phone].        Instructions    1. We have scheduled a sleep study  2. We have scheduled an echocardiogram  3. We have scheduled a chest x-ray and pulmonary function testing  4. We have made a referral to a nutritionist  5. We have prescribed oxygen therapy  6. Recommend follow-up after the above testing          i[employer] Access Code: [email or web address]  Expires: 5/29/2017 10:53 AM    i.am.plus electronics  A secure, online tool to manage your health information     [employer]’s i.am.plus electronics® is a secure, online tool that connects you to your personalized health information from the privacy of your home -- day or night - making it very easy for you to manage your healthcare. Once the activation process is completed, you can even access your medical information using the i.am.plus electronics josef, which is available for free in the Apple  Myriam store or Google Play store.     [employer] provides the following levels of access (as shown below):   My Chart Features   [facility] Doctor [facility]  Specialists [facility]  Urgent  Care Non-[facility]  Primary Care  Doctor   Email your healthcare team securely and privately 24/7 X X X    Manage appointments: schedule your next appointment; view details of past/upcoming appointments X      Request prescription refills. X      View recent personal medical records, including lab and immunizations X X X X   View health record, including health history, allergies, medications X X X X   Read reports about your outpatient visits, procedures, consult and ER notes X X X X   See your discharge summary, which is a recap of your hospital and/or ER visit that includes your diagnosis, lab results, and care plan. X X       How to register for Brandlive:  1. Go to  [email or web address].  2. Click on the Sign Up Now box, which takes you to the New Member Sign Up page. You will need to provide the following information:  a. Enter your Brandlive Access Code exactly as it appears at the top of this page. (You will not need to use this code after you’ve completed the sign-up process. If you do not sign up before the expiration date, you must request a new code.)   b. Enter your date of birth.   c. Enter your home email address.   d. Click Submit, and follow the next screen’s instructions.  3. Create a Brandlive ID. This will be your Brandlive login ID and cannot be changed, so think of one that is secure and easy to remember.  4. Create a Brandlive password. You can change your password at any time.  5. Enter your Password Reset Question and Answer. This can be used at a later time if you forget your password.   6. Enter your e-mail address. This allows you to receive e-mail notifications when new information is available in Brandlive.  7. Click Sign Up. You can now view your health information.    For assistance activating your [employer]  account, call [phone]        Quit Tobacco Information     Do you want to quit using tobacco?    Quitting tobacco decreases risks of cancer, heart and lung disease, increases life expectancy, improves sense of taste and smell, and increases spending money, among other benefits.    If you are thinking about quitting, we can help.  • [facility] Quit Tobacco Program: 442.169.4479  o Program occurs weekly for four weeks and includes pharmacist consultation on products to support quitting smoking or chewing tobacco. A provider referral is needed for pharmacist consultation.  • Tobacco Users Help Hotline: 2-800-QUIT-NOW (878-3919) or https://[place].quitlogix.org/  o Free, confidential telephone and online coaching for [place] residents. Sessions are designed on a schedule that is convenient for you. Eligible clients receive free nicotine replacement therapy.  • Nationally: www.smokefree.gov  o Information and professional assistance to support both immediate and long-term needs as you become, and remain, a non-smoker. Smokefree.gov allows you to choose the help that best fits your needs.

## 2017-05-20 ENCOUNTER — HOSPITAL ENCOUNTER (OUTPATIENT)
Dept: LAB | Facility: MEDICAL CENTER | Age: 42
End: 2017-05-20
Attending: INTERNAL MEDICINE
Payer: COMMERCIAL

## 2017-05-20 LAB
25(OH)D3 SERPL-MCNC: 19 NG/ML (ref 30–100)
ALBUMIN SERPL BCP-MCNC: 4 G/DL (ref 3.2–4.9)
ALBUMIN/GLOB SERPL: 1.3 G/DL
ALP SERPL-CCNC: 103 U/L (ref 30–99)
ALT SERPL-CCNC: 21 U/L (ref 2–50)
ANION GAP SERPL CALC-SCNC: 11 MMOL/L (ref 0–11.9)
APPEARANCE UR: CLEAR
AST SERPL-CCNC: 16 U/L (ref 12–45)
BASOPHILS # BLD AUTO: 1 % (ref 0–1.8)
BASOPHILS # BLD: 0.09 K/UL (ref 0–0.12)
BILIRUB SERPL-MCNC: 0.8 MG/DL (ref 0.1–1.5)
BILIRUB UR QL STRIP.AUTO: NEGATIVE
BUN SERPL-MCNC: 20 MG/DL (ref 8–22)
CALCIUM SERPL-MCNC: 9.2 MG/DL (ref 8.5–10.5)
CHLORIDE SERPL-SCNC: 103 MMOL/L (ref 96–112)
CHOLEST SERPL-MCNC: 167 MG/DL (ref 100–199)
CO2 SERPL-SCNC: 30 MMOL/L (ref 20–33)
COLOR UR: COLORLESS
CREAT SERPL-MCNC: 0.93 MG/DL (ref 0.5–1.4)
CREAT UR-MCNC: 28.6 MG/DL
EOSINOPHIL # BLD AUTO: 0.11 K/UL (ref 0–0.51)
EOSINOPHIL NFR BLD: 1.2 % (ref 0–6.9)
ERYTHROCYTE [DISTWIDTH] IN BLOOD BY AUTOMATED COUNT: 52.8 FL (ref 35.9–50)
EST. AVERAGE GLUCOSE BLD GHB EST-MCNC: 128 MG/DL
FERRITIN SERPL-MCNC: 25.2 NG/ML (ref 22–322)
GFR SERPL CREATININE-BSD FRML MDRD: >60 ML/MIN/1.73 M 2
GLOBULIN SER CALC-MCNC: 3.1 G/DL (ref 1.9–3.5)
GLUCOSE SERPL-MCNC: 118 MG/DL (ref 65–99)
GLUCOSE UR STRIP.AUTO-MCNC: NEGATIVE MG/DL
HBA1C MFR BLD: 6.1 % (ref 0–5.6)
HCT VFR BLD AUTO: 63.5 % (ref 42–52)
HDLC SERPL-MCNC: 40 MG/DL
HGB BLD-MCNC: 19.9 G/DL (ref 14–18)
IMM GRANULOCYTES # BLD AUTO: 0.02 K/UL (ref 0–0.11)
IMM GRANULOCYTES NFR BLD AUTO: 0.2 % (ref 0–0.9)
IRON SATN MFR SERPL: 14 % (ref 15–55)
IRON SERPL-MCNC: 68 UG/DL (ref 50–180)
KETONES UR STRIP.AUTO-MCNC: NEGATIVE MG/DL
LDLC SERPL CALC-MCNC: 113 MG/DL
LEUKOCYTE ESTERASE UR QL STRIP.AUTO: NEGATIVE
LYMPHOCYTES # BLD AUTO: 1.66 K/UL (ref 1–4.8)
LYMPHOCYTES NFR BLD: 18.8 % (ref 22–41)
MAGNESIUM SERPL-MCNC: 1.8 MG/DL (ref 1.5–2.5)
MCH RBC QN AUTO: 29.8 PG (ref 27–33)
MCHC RBC AUTO-ENTMCNC: 31.3 G/DL (ref 33.7–35.3)
MCV RBC AUTO: 95.2 FL (ref 81.4–97.8)
MICRO URNS: NORMAL
MONOCYTES # BLD AUTO: 0.64 K/UL (ref 0–0.85)
MONOCYTES NFR BLD AUTO: 7.2 % (ref 0–13.4)
NEUTROPHILS # BLD AUTO: 6.33 K/UL (ref 1.82–7.42)
NEUTROPHILS NFR BLD: 71.6 % (ref 44–72)
NITRITE UR QL STRIP.AUTO: NEGATIVE
NRBC # BLD AUTO: 0 K/UL
NRBC BLD AUTO-RTO: 0 /100 WBC
PH UR STRIP.AUTO: 5.5 [PH]
PHOSPHATE SERPL-MCNC: 4.3 MG/DL (ref 2.5–4.5)
PLATELET # BLD AUTO: 231 K/UL (ref 164–446)
PMV BLD AUTO: 10.4 FL (ref 9–12.9)
POTASSIUM SERPL-SCNC: 4.5 MMOL/L (ref 3.6–5.5)
PROT SERPL-MCNC: 7.1 G/DL (ref 6–8.2)
PROT UR QL STRIP: NEGATIVE MG/DL
PROT UR-MCNC: 6.4 MG/DL (ref 0–15)
PROT/CREAT UR: 224 MG/G (ref 15–68)
PTH-INTACT SERPL-MCNC: 124.8 PG/ML (ref 14–72)
RBC # BLD AUTO: 6.67 M/UL (ref 4.7–6.1)
RBC UR QL AUTO: NEGATIVE
SODIUM SERPL-SCNC: 144 MMOL/L (ref 135–145)
SP GR UR STRIP.AUTO: 1.01
TIBC SERPL-MCNC: 494 UG/DL (ref 250–450)
TRIGL SERPL-MCNC: 68 MG/DL (ref 0–149)
URATE SERPL-MCNC: 9.9 MG/DL (ref 2.5–8.3)
WBC # BLD AUTO: 8.9 K/UL (ref 4.8–10.8)

## 2017-05-20 PROCEDURE — 84100 ASSAY OF PHOSPHORUS: CPT

## 2017-05-20 PROCEDURE — 83036 HEMOGLOBIN GLYCOSYLATED A1C: CPT

## 2017-05-20 PROCEDURE — 83970 ASSAY OF PARATHORMONE: CPT

## 2017-05-20 PROCEDURE — 83550 IRON BINDING TEST: CPT

## 2017-05-20 PROCEDURE — 84156 ASSAY OF PROTEIN URINE: CPT

## 2017-05-20 PROCEDURE — 84550 ASSAY OF BLOOD/URIC ACID: CPT

## 2017-05-20 PROCEDURE — 82306 VITAMIN D 25 HYDROXY: CPT

## 2017-05-20 PROCEDURE — 85025 COMPLETE CBC W/AUTO DIFF WBC: CPT

## 2017-05-20 PROCEDURE — 80061 LIPID PANEL: CPT

## 2017-05-20 PROCEDURE — 82728 ASSAY OF FERRITIN: CPT

## 2017-05-20 PROCEDURE — 83540 ASSAY OF IRON: CPT

## 2017-05-20 PROCEDURE — 81003 URINALYSIS AUTO W/O SCOPE: CPT

## 2017-05-20 PROCEDURE — 36415 COLL VENOUS BLD VENIPUNCTURE: CPT

## 2017-05-20 PROCEDURE — 80053 COMPREHEN METABOLIC PANEL: CPT

## 2017-05-20 PROCEDURE — 82570 ASSAY OF URINE CREATININE: CPT

## 2017-05-20 PROCEDURE — 83735 ASSAY OF MAGNESIUM: CPT

## 2017-06-09 ENCOUNTER — HOSPITAL ENCOUNTER (OUTPATIENT)
Dept: RADIOLOGY | Facility: MEDICAL CENTER | Age: 42
End: 2017-06-09
Attending: INTERNAL MEDICINE
Payer: COMMERCIAL

## 2017-06-09 ENCOUNTER — HOSPITAL ENCOUNTER (OUTPATIENT)
Dept: CARDIOLOGY | Facility: MEDICAL CENTER | Age: 42
End: 2017-06-09
Attending: INTERNAL MEDICINE
Payer: COMMERCIAL

## 2017-06-09 DIAGNOSIS — D63.1 ANEMIA IN CHRONIC KIDNEY DISEASE(285.21): ICD-10-CM

## 2017-06-09 DIAGNOSIS — N18.9 ANEMIA IN CHRONIC KIDNEY DISEASE(285.21): ICD-10-CM

## 2017-06-09 DIAGNOSIS — R09.02 HYPOXIA: ICD-10-CM

## 2017-06-09 LAB
LV EJECT FRACT  99904: 60
LV EJECT FRACT MOD 2C 99903: 64.74
LV EJECT FRACT MOD 4C 99902: 50.39
LV EJECT FRACT MOD BP 99901: 55.68

## 2017-06-09 PROCEDURE — 71020 DX-CHEST-2 VIEWS: CPT

## 2017-06-09 PROCEDURE — 93306 TTE W/DOPPLER COMPLETE: CPT

## 2017-06-09 PROCEDURE — 76775 US EXAM ABDO BACK WALL LIM: CPT

## 2017-06-09 PROCEDURE — 93306 TTE W/DOPPLER COMPLETE: CPT | Mod: 26 | Performed by: INTERNAL MEDICINE

## 2017-06-22 ENCOUNTER — SLEEP STUDY (OUTPATIENT)
Dept: SLEEP MEDICINE | Facility: MEDICAL CENTER | Age: 42
End: 2017-06-22
Attending: INTERNAL MEDICINE
Payer: COMMERCIAL

## 2017-06-22 DIAGNOSIS — G47.33 OBSTRUCTIVE SLEEP APNEA: ICD-10-CM

## 2017-06-22 PROCEDURE — 95810 POLYSOM 6/> YRS 4/> PARAM: CPT | Performed by: INTERNAL MEDICINE

## 2017-06-23 ENCOUNTER — HOSPITAL ENCOUNTER (INPATIENT)
Facility: MEDICAL CENTER | Age: 42
LOS: 1 days | DRG: 308 | End: 2017-06-24
Attending: EMERGENCY MEDICINE | Admitting: HOSPITALIST
Payer: COMMERCIAL

## 2017-06-23 ENCOUNTER — RESOLUTE PROFESSIONAL BILLING HOSPITAL PROF FEE (OUTPATIENT)
Dept: HOSPITALIST | Facility: MEDICAL CENTER | Age: 42
End: 2017-06-23
Payer: COMMERCIAL

## 2017-06-23 ENCOUNTER — APPOINTMENT (OUTPATIENT)
Dept: RADIOLOGY | Facility: MEDICAL CENTER | Age: 42
DRG: 308 | End: 2017-06-23
Attending: EMERGENCY MEDICINE
Payer: COMMERCIAL

## 2017-06-23 DIAGNOSIS — G47.33 OBSTRUCTIVE SLEEP APNEA SYNDROME: ICD-10-CM

## 2017-06-23 DIAGNOSIS — I47.29 VENTRICULAR TACHYCARDIA, NONSUSTAINED (HCC): ICD-10-CM

## 2017-06-23 DIAGNOSIS — R09.02 HYPOXIA: ICD-10-CM

## 2017-06-23 DIAGNOSIS — G47.30 SLEEP APNEA, UNSPECIFIED TYPE: ICD-10-CM

## 2017-06-23 DIAGNOSIS — I47.20 VENTRICULAR TACHYCARDIA (HCC): ICD-10-CM

## 2017-06-23 DIAGNOSIS — I47.10 SVT (SUPRAVENTRICULAR TACHYCARDIA): ICD-10-CM

## 2017-06-23 DIAGNOSIS — I50.1 CARDIOGENIC PULMONARY EDEMA (HCC): ICD-10-CM

## 2017-06-23 DIAGNOSIS — E66.01 MORBID OBESITY DUE TO EXCESS CALORIES (HCC): ICD-10-CM

## 2017-06-23 DIAGNOSIS — R00.8 TRIGEMINY: ICD-10-CM

## 2017-06-23 DIAGNOSIS — D75.1 POLYCYTHEMIA: ICD-10-CM

## 2017-06-23 LAB
ALBUMIN SERPL BCP-MCNC: 3.9 G/DL (ref 3.2–4.9)
ALBUMIN/GLOB SERPL: 1.3 G/DL
ALP SERPL-CCNC: 89 U/L (ref 30–99)
ALT SERPL-CCNC: 28 U/L (ref 2–50)
ANION GAP SERPL CALC-SCNC: 10 MMOL/L (ref 0–11.9)
AST SERPL-CCNC: 22 U/L (ref 12–45)
BASOPHILS # BLD AUTO: 0.7 % (ref 0–1.8)
BASOPHILS # BLD: 0.06 K/UL (ref 0–0.12)
BILIRUB SERPL-MCNC: 0.7 MG/DL (ref 0.1–1.5)
BUN SERPL-MCNC: 19 MG/DL (ref 8–22)
CALCIUM SERPL-MCNC: 9.1 MG/DL (ref 8.5–10.5)
CHLORIDE SERPL-SCNC: 100 MMOL/L (ref 96–112)
CHOLEST SERPL-MCNC: 161 MG/DL (ref 100–199)
CO2 SERPL-SCNC: 28 MMOL/L (ref 20–33)
CREAT SERPL-MCNC: 1.12 MG/DL (ref 0.5–1.4)
EKG IMPRESSION: NORMAL
EOSINOPHIL # BLD AUTO: 0.2 K/UL (ref 0–0.51)
EOSINOPHIL NFR BLD: 2.2 % (ref 0–6.9)
ERYTHROCYTE [DISTWIDTH] IN BLOOD BY AUTOMATED COUNT: 50.7 FL (ref 35.9–50)
GFR SERPL CREATININE-BSD FRML MDRD: >60 ML/MIN/1.73 M 2
GLOBULIN SER CALC-MCNC: 3 G/DL (ref 1.9–3.5)
GLUCOSE SERPL-MCNC: 108 MG/DL (ref 65–99)
HCT VFR BLD AUTO: 61.7 % (ref 42–52)
HDLC SERPL-MCNC: 38 MG/DL
HGB BLD-MCNC: 19.7 G/DL (ref 14–18)
IMM GRANULOCYTES # BLD AUTO: 0.03 K/UL (ref 0–0.11)
IMM GRANULOCYTES NFR BLD AUTO: 0.3 % (ref 0–0.9)
INR PPP: 0.94 (ref 0.87–1.13)
LDLC SERPL CALC-MCNC: 99 MG/DL
LYMPHOCYTES # BLD AUTO: 2.13 K/UL (ref 1–4.8)
LYMPHOCYTES NFR BLD: 23.3 % (ref 22–41)
MAGNESIUM SERPL-MCNC: 1.9 MG/DL (ref 1.5–2.5)
MCH RBC QN AUTO: 30 PG (ref 27–33)
MCHC RBC AUTO-ENTMCNC: 31.9 G/DL (ref 33.7–35.3)
MCV RBC AUTO: 94.1 FL (ref 81.4–97.8)
MONOCYTES # BLD AUTO: 0.66 K/UL (ref 0–0.85)
MONOCYTES NFR BLD AUTO: 7.2 % (ref 0–13.4)
NEUTROPHILS # BLD AUTO: 6.06 K/UL (ref 1.82–7.42)
NEUTROPHILS NFR BLD: 66.3 % (ref 44–72)
NRBC # BLD AUTO: 0 K/UL
NRBC BLD AUTO-RTO: 0 /100 WBC
PLATELET # BLD AUTO: 238 K/UL (ref 164–446)
PMV BLD AUTO: 10.1 FL (ref 9–12.9)
POTASSIUM SERPL-SCNC: 3.9 MMOL/L (ref 3.6–5.5)
PROT SERPL-MCNC: 6.9 G/DL (ref 6–8.2)
PROTHROMBIN TIME: 12.9 SEC (ref 12–14.6)
RBC # BLD AUTO: 6.56 M/UL (ref 4.7–6.1)
SODIUM SERPL-SCNC: 138 MMOL/L (ref 135–145)
TRIGL SERPL-MCNC: 122 MG/DL (ref 0–149)
TROPONIN I SERPL-MCNC: 0.04 NG/ML (ref 0–0.04)
TSH SERPL DL<=0.005 MIU/L-ACNC: 5.09 UIU/ML (ref 0.3–3.7)
WBC # BLD AUTO: 9.1 K/UL (ref 4.8–10.8)

## 2017-06-23 PROCEDURE — 93005 ELECTROCARDIOGRAM TRACING: CPT | Performed by: EMERGENCY MEDICINE

## 2017-06-23 PROCEDURE — 770020 HCHG ROOM/CARE - TELE (206)

## 2017-06-23 PROCEDURE — 71010 DX-CHEST-PORTABLE (1 VIEW): CPT

## 2017-06-23 PROCEDURE — 84484 ASSAY OF TROPONIN QUANT: CPT

## 2017-06-23 PROCEDURE — A9270 NON-COVERED ITEM OR SERVICE: HCPCS | Performed by: HOSPITALIST

## 2017-06-23 PROCEDURE — 700102 HCHG RX REV CODE 250 W/ 637 OVERRIDE(OP): Performed by: INTERNAL MEDICINE

## 2017-06-23 PROCEDURE — 700111 HCHG RX REV CODE 636 W/ 250 OVERRIDE (IP): Performed by: HOSPITALIST

## 2017-06-23 PROCEDURE — 85610 PROTHROMBIN TIME: CPT

## 2017-06-23 PROCEDURE — 84443 ASSAY THYROID STIM HORMONE: CPT

## 2017-06-23 PROCEDURE — 700102 HCHG RX REV CODE 250 W/ 637 OVERRIDE(OP): Performed by: HOSPITALIST

## 2017-06-23 PROCEDURE — 96365 THER/PROPH/DIAG IV INF INIT: CPT

## 2017-06-23 PROCEDURE — 99223 1ST HOSP IP/OBS HIGH 75: CPT | Performed by: HOSPITALIST

## 2017-06-23 PROCEDURE — 80053 COMPREHEN METABOLIC PANEL: CPT

## 2017-06-23 PROCEDURE — 83735 ASSAY OF MAGNESIUM: CPT

## 2017-06-23 PROCEDURE — 700105 HCHG RX REV CODE 258

## 2017-06-23 PROCEDURE — 85025 COMPLETE CBC W/AUTO DIFF WBC: CPT

## 2017-06-23 PROCEDURE — A9270 NON-COVERED ITEM OR SERVICE: HCPCS | Performed by: INTERNAL MEDICINE

## 2017-06-23 PROCEDURE — 700105 HCHG RX REV CODE 258: Performed by: EMERGENCY MEDICINE

## 2017-06-23 PROCEDURE — 96366 THER/PROPH/DIAG IV INF ADDON: CPT

## 2017-06-23 PROCEDURE — 99291 CRITICAL CARE FIRST HOUR: CPT

## 2017-06-23 PROCEDURE — 5A09357 ASSISTANCE WITH RESPIRATORY VENTILATION, LESS THAN 24 CONSECUTIVE HOURS, CONTINUOUS POSITIVE AIRWAY PRESSURE: ICD-10-PCS | Performed by: HOSPITALIST

## 2017-06-23 PROCEDURE — 700111 HCHG RX REV CODE 636 W/ 250 OVERRIDE (IP): Performed by: INTERNAL MEDICINE

## 2017-06-23 PROCEDURE — 99291 CRITICAL CARE FIRST HOUR: CPT | Performed by: INTERNAL MEDICINE

## 2017-06-23 PROCEDURE — 700111 HCHG RX REV CODE 636 W/ 250 OVERRIDE (IP): Performed by: EMERGENCY MEDICINE

## 2017-06-23 PROCEDURE — 80061 LIPID PANEL: CPT

## 2017-06-23 PROCEDURE — 94002 VENT MGMT INPAT INIT DAY: CPT

## 2017-06-23 PROCEDURE — 94760 N-INVAS EAR/PLS OXIMETRY 1: CPT

## 2017-06-23 RX ORDER — PROMETHAZINE HYDROCHLORIDE 25 MG/1
12.5-25 TABLET ORAL EVERY 4 HOURS PRN
Status: DISCONTINUED | OUTPATIENT
Start: 2017-06-23 | End: 2017-06-24 | Stop reason: HOSPADM

## 2017-06-23 RX ORDER — OXYCODONE HYDROCHLORIDE 5 MG/1
5 TABLET ORAL EVERY 4 HOURS PRN
Status: DISCONTINUED | OUTPATIENT
Start: 2017-06-23 | End: 2017-06-24 | Stop reason: HOSPADM

## 2017-06-23 RX ORDER — AMLODIPINE BESYLATE 10 MG/1
10 TABLET ORAL DAILY
Status: ON HOLD | COMMUNITY
End: 2017-06-24

## 2017-06-23 RX ORDER — ALLOPURINOL 300 MG/1
300 TABLET ORAL DAILY
COMMUNITY

## 2017-06-23 RX ORDER — LOSARTAN POTASSIUM 100 MG/1
100 TABLET ORAL DAILY
COMMUNITY

## 2017-06-23 RX ORDER — ERGOCALCIFEROL 1.25 MG/1
50000 CAPSULE ORAL ONCE
Status: COMPLETED | OUTPATIENT
Start: 2017-06-23 | End: 2017-06-23

## 2017-06-23 RX ORDER — POTASSIUM CHLORIDE 20 MEQ/1
20 TABLET, EXTENDED RELEASE ORAL 2 TIMES DAILY
Status: DISCONTINUED | OUTPATIENT
Start: 2017-06-23 | End: 2017-06-24 | Stop reason: HOSPADM

## 2017-06-23 RX ORDER — PROMETHAZINE HYDROCHLORIDE 25 MG/1
12.5-25 SUPPOSITORY RECTAL EVERY 4 HOURS PRN
Status: DISCONTINUED | OUTPATIENT
Start: 2017-06-23 | End: 2017-06-24 | Stop reason: HOSPADM

## 2017-06-23 RX ORDER — SODIUM CHLORIDE 9 MG/ML
INJECTION, SOLUTION INTRAVENOUS
Status: COMPLETED
Start: 2017-06-23 | End: 2017-06-23

## 2017-06-23 RX ORDER — MAGNESIUM SULFATE HEPTAHYDRATE 40 MG/ML
2 INJECTION, SOLUTION INTRAVENOUS ONCE
Status: COMPLETED | OUTPATIENT
Start: 2017-06-23 | End: 2017-06-23

## 2017-06-23 RX ORDER — ALLOPURINOL 100 MG/1
100 TABLET ORAL DAILY
Status: DISCONTINUED | OUTPATIENT
Start: 2017-06-23 | End: 2017-06-24 | Stop reason: HOSPADM

## 2017-06-23 RX ORDER — ONDANSETRON 2 MG/ML
4 INJECTION INTRAMUSCULAR; INTRAVENOUS EVERY 4 HOURS PRN
Status: DISCONTINUED | OUTPATIENT
Start: 2017-06-23 | End: 2017-06-24 | Stop reason: HOSPADM

## 2017-06-23 RX ORDER — BISACODYL 10 MG
10 SUPPOSITORY, RECTAL RECTAL
Status: DISCONTINUED | OUTPATIENT
Start: 2017-06-23 | End: 2017-06-24 | Stop reason: HOSPADM

## 2017-06-23 RX ORDER — LORAZEPAM 2 MG/ML
2 INJECTION INTRAMUSCULAR
Status: DISCONTINUED | OUTPATIENT
Start: 2017-06-23 | End: 2017-06-23

## 2017-06-23 RX ORDER — FUROSEMIDE 10 MG/ML
40 INJECTION INTRAMUSCULAR; INTRAVENOUS
Status: DISCONTINUED | OUTPATIENT
Start: 2017-06-23 | End: 2017-06-24 | Stop reason: HOSPADM

## 2017-06-23 RX ORDER — FUROSEMIDE 10 MG/ML
20 INJECTION INTRAMUSCULAR; INTRAVENOUS
Status: DISCONTINUED | OUTPATIENT
Start: 2017-06-23 | End: 2017-06-23

## 2017-06-23 RX ORDER — POLYETHYLENE GLYCOL 3350 17 G/17G
1 POWDER, FOR SOLUTION ORAL
Status: DISCONTINUED | OUTPATIENT
Start: 2017-06-23 | End: 2017-06-24 | Stop reason: HOSPADM

## 2017-06-23 RX ORDER — AMOXICILLIN 250 MG
2 CAPSULE ORAL 2 TIMES DAILY
Status: DISCONTINUED | OUTPATIENT
Start: 2017-06-23 | End: 2017-06-24 | Stop reason: HOSPADM

## 2017-06-23 RX ORDER — ONDANSETRON 4 MG/1
4 TABLET, ORALLY DISINTEGRATING ORAL EVERY 4 HOURS PRN
Status: DISCONTINUED | OUTPATIENT
Start: 2017-06-23 | End: 2017-06-24 | Stop reason: HOSPADM

## 2017-06-23 RX ORDER — DEXTROSE MONOHYDRATE 50 MG/ML
500 INJECTION, SOLUTION INTRAVENOUS CONTINUOUS
Status: DISCONTINUED | OUTPATIENT
Start: 2017-06-23 | End: 2017-06-23

## 2017-06-23 RX ADMIN — DEXTROSE MONOHYDRATE 500 ML: 50 INJECTION, SOLUTION INTRAVENOUS at 01:22

## 2017-06-23 RX ADMIN — SODIUM CHLORIDE 500 ML: 9 INJECTION, SOLUTION INTRAVENOUS at 10:40

## 2017-06-23 RX ADMIN — MAGNESIUM SULFATE IN WATER 2 G: 40 INJECTION, SOLUTION INTRAVENOUS at 10:38

## 2017-06-23 RX ADMIN — SENNOSIDES AND DOCUSATE SODIUM 2 TABLET: 8.6; 5 TABLET ORAL at 08:50

## 2017-06-23 RX ADMIN — SENNOSIDES AND DOCUSATE SODIUM 2 TABLET: 8.6; 5 TABLET ORAL at 21:32

## 2017-06-23 RX ADMIN — POTASSIUM CHLORIDE 20 MEQ: 1500 TABLET, EXTENDED RELEASE ORAL at 08:50

## 2017-06-23 RX ADMIN — FUROSEMIDE 40 MG: 10 INJECTION, SOLUTION INTRAVENOUS at 16:28

## 2017-06-23 RX ADMIN — POTASSIUM CHLORIDE 20 MEQ: 1500 TABLET, EXTENDED RELEASE ORAL at 21:32

## 2017-06-23 RX ADMIN — ENOXAPARIN SODIUM 40 MG: 100 INJECTION SUBCUTANEOUS at 08:50

## 2017-06-23 RX ADMIN — MAGNESIUM GLUCONATE 500 MG ORAL TABLET 400 MG: 500 TABLET ORAL at 08:50

## 2017-06-23 RX ADMIN — ALLOPURINOL 100 MG: 100 TABLET ORAL at 08:50

## 2017-06-23 RX ADMIN — AMIODARONE HYDROCHLORIDE 1 MG/MIN: 50 INJECTION, SOLUTION INTRAVENOUS at 01:21

## 2017-06-23 RX ADMIN — ERGOCALCIFEROL 50000 UNITS: 1.25 CAPSULE ORAL at 08:50

## 2017-06-23 ASSESSMENT — COPD QUESTIONNAIRES
COPD SCREENING SCORE: 0
DURING THE PAST 4 WEEKS HOW MUCH DID YOU FEEL SHORT OF BREATH: NONE/LITTLE OF THE TIME
HAVE YOU SMOKED AT LEAST 100 CIGARETTES IN YOUR ENTIRE LIFE: NO/DON'T KNOW
DO YOU EVER COUGH UP ANY MUCUS OR PHLEGM?: NO/ONLY WITH OCCASIONAL COLDS OR INFECTIONS

## 2017-06-23 ASSESSMENT — LIFESTYLE VARIABLES
TOTAL SCORE: 0
HAVE YOU EVER FELT YOU SHOULD CUT DOWN ON YOUR DRINKING: NO
TOTAL SCORE: 0
EVER_SMOKED: NEVER
EVER HAD A DRINK FIRST THING IN THE MORNING TO STEADY YOUR NERVES TO GET RID OF A HANGOVER: NO
DO YOU DRINK ALCOHOL: YES
AVERAGE NUMBER OF DAYS PER WEEK YOU HAVE A DRINK CONTAINING ALCOHOL: 2
CONSUMPTION TOTAL: POSITIVE
HOW MANY TIMES IN THE PAST YEAR HAVE YOU HAD 5 OR MORE DRINKS IN A DAY: 5
TOTAL SCORE: 0
ON A TYPICAL DAY WHEN YOU DRINK ALCOHOL HOW MANY DRINKS DO YOU HAVE: 2
EVER FELT BAD OR GUILTY ABOUT YOUR DRINKING: NO
EVER_SMOKED: NEVER
EVER_SMOKED: NEVER
HAVE PEOPLE ANNOYED YOU BY CRITICIZING YOUR DRINKING: NO

## 2017-06-23 ASSESSMENT — PAIN SCALES - GENERAL
PAINLEVEL_OUTOF10: 0

## 2017-06-23 ASSESSMENT — PULMONARY FUNCTION TESTS
EPAP_CMH2O: 8

## 2017-06-23 NOTE — ED NOTES
The Medication Reconciliation process has been completed by interviewing the patient    Allergies have been reviewed  Antibiotic use in 30 days - NONE    Home Pharmacy:  [facility] [place]

## 2017-06-23 NOTE — RESPIRATORY CARE
Pt complaining of mask, keeps removing it. New larger mask given. Pt still removes mask. Pt educated regarding the importance of cpap.

## 2017-06-23 NOTE — IP AVS SNAPSHOT
" Home Care Instructions                                                                                                                  Name:Lionel Zacarias  Medical Record Number:[id number]  CSN: [id number]    YOB: 1975   Age: 41 y.o.  Sex: male  HT:1.905 m (6' 3\") WT: 216.9 kg (478 lb 2.8 oz)          Admit Date: 6/23/2017     Discharge Date:   Today's Date: 6/24/2017  Attending Doctor:  Tom Christian D.O.                  Allergies:  Morphine and Pcn            Discharge Instructions       Discharge Instructions    Discharged to home by car with friend. Discharged via wheelchair, hospital escort: Yes.  Special equipment needed: Oxygen    Be sure to schedule a follow-up appointment with your primary care doctor or any specialists as instructed.     Discharge Plan:        I understand that a diet low in cholesterol, fat, and sodium is recommended for good health. Unless I have been given specific instructions below for another diet, I accept this instruction as my diet prescription.   Other diet: Cardiac      Special Instructions: None    · Is patient discharged on Warfarin / Coumadin?   No     · Is patient Post Blood Transfusion?  No    Depression / Suicide Risk    As you are discharged from this [facility] facility, it is important to learn how to keep safe from harming yourself.    Recognize the warning signs:  · Abrupt changes in personality, positive or negative- including increase in energy   · Giving away possessions  · Change in eating patterns- significant weight changes-  positive or negative  · Change in sleeping patterns- unable to sleep or sleeping all the time   · Unwillingness or inability to communicate  · Depression  · Unusual sadness, discouragement and loneliness  · Talk of wanting to die  · Neglect of personal appearance   · Rebelliousness- reckless behavior  · Withdrawal from people/activities they love  · Confusion- inability to concentrate     If you or a loved one observes " any of these behaviors or has concerns about self-harm, here's what you can do:  · Talk about it- your feelings and reasons for harming yourself  · Remove any means that you might use to hurt yourself (examples: pills, rope, extension cords, firearm)  · Get professional help from the community (Mental Health, Substance Abuse, psychological counseling)  · Do not be alone:Call your Safe Contact- someone whom you trust who will be there for you.  · Call your local CRISIS HOTLINE 766-3677 or 468-290-2697  · Call your local Children's Mobile Crisis Response Team [place] (591) 116-3343 or www."[employer]"  · Call the toll free National Suicide Prevention Hotlines   · National Suicide Prevention Lifeline 455-944-IXTG (7089)  · [employer] Hope Line Network 800-SUICIDE (306-0472)        Your appointments     Jul 10, 2017  1:00 PM   Follow UP with Simba Foster M.D.   [facility] (--)    [address]  [place] [address] [phone]            2017 10:30 AM   New Patient with Mike Lorenz RD   [employer] [facility])    [address]  [address]  [place] [address] [phone]           It is the patient's responsibility to check with your Insurance for benefit coverage for visit / visits.  24 hours notice is required for all appointment changes or cancellation.  Please arrive 20 min. before your appointment time  Please bring the following with you: 1)Picture Id 2) Insurance card 3) Completed Forms if New Patient  If scheduled for DIABETES VISIT please also brin) Medications 2) Meter 3) Blood glucose logs 4) Any recent labs if you have them  If scheduled for NUTRITION VISIT please also brin) 2-3 days of detailed food intake logs 2) Blood glucose monitor and blood glucose logs (if you have them)              Follow-up Information     1. Follow up with Austen Saunders M.D..    Specialty:  Family Medicine    Contact  information    [address]  [place] [address]  [phone]          2. Schedule an appointment as soon as possible for a visit in 1 week to follow up.         Discharge Medication Instructions:    Below are the medications your physician expects you to take upon discharge:    Review all your home medications and newly ordered medications with your doctor and/or pharmacist. Follow medication instructions as directed by your doctor and/or pharmacist.    Please keep your medication list with you and share with your physician.               Medication List      START taking these medications        Instructions    Morning Afternoon Evening Bedtime    carvedilol 6.25 MG Tabs   Last time this was given:  6.25 mg on 6/24/2017  1:08 PM   Commonly known as:  COREG        Take 1 Tab by mouth 2 times a day, with meals.   Dose:  6.25 mg                        spironolactone 25 MG Tabs   Last time this was given:  25 mg on 6/24/2017  1:09 PM   Commonly known as:  ALDACTONE        Take 1 Tab by mouth every day.   Dose:  25 mg                          CONTINUE taking these medications        Instructions    Morning Afternoon Evening Bedtime    allopurinol 300 MG Tabs   Last time this was given:  100 mg on 6/24/2017 10:00 AM   Commonly known as:  ZYLOPRIM        Take 300 mg by mouth every day.   Dose:  300 mg                        furosemide 40 MG Tabs   Commonly known as:  LASIX        Take 40 mg by mouth 2 Times a Day.   Dose:  40 mg                        losartan 100 MG Tabs   Last time this was given:  100 mg on 6/24/2017  1:09 PM   Commonly known as:  COZAAR        Take 100 mg by mouth every day.   Dose:  100 mg                          STOP taking these medications     amlodipine 10 MG Tabs   Commonly known as:  NORVASC                    Where to Get Your Medications      Information about where to get these medications is not yet available     ! Ask your nurse or doctor about these medications    - carvedilol  6.25 MG Tabs  - spironolactone 25 MG Tabs            Orders for after discharge     DME Other    Complete by:  As directed              Instructions           Diet / Nutrition:    Follow any diet instructions given to you by your doctor or the dietician, including how much salt (sodium) you are allowed each day.    If you are overweight, talk to your doctor about a weight reduction plan.    Activity:    Remain physically active following your doctor's instructions about exercise and activity.    Rest often.     Any time you become even a little tired or short of breath, SIT DOWN and rest.    Worsening Symptoms:    Report any of the following signs and symptoms to the doctor's office immediately:    *Pain of jaw, arm, or neck  *Chest pain not relieved by medication                               *Dizziness or loss of consciousness  *Difficulty breathing even when at rest   *More tired than usual                                       *Bleeding drainage or swelling of surgical site  *Swelling of feet, ankles, legs or stomach                 *Fever (>100ºF)  *Pink or blood tinged sputum  *Weight gain (3lbs/day or 5lbs /week)           *Shock from internal defibrillator (if applicable)  *Palpitations or irregular heartbeats                *Cool and/or numb extremities    Stroke Awareness    Common Risk Factors for Stroke include:    Age  Atrial Fibrillation  Carotid Artery Stenosis  Diabetes Mellitus  Excessive alcohol consumption  High blood pressure  Overweight   Physical inactivity  Smoking    Warning signs and symptoms of a stroke include:    *Sudden numbness or weakness of the face, arm or leg (especially on one side of the body).  *Sudden confusion, trouble speaking or understanding.  *Sudden trouble seeing in one or both eyes.  *Sudden trouble walking, dizziness, loss of balance or coordination.Sudden severe headache with no known cause.    It is very important to get treatment quickly when a stroke occurs. If you  experience any of the above warning signs, call 911 immediately.                   Disclaimer         Quit Smoking / Tobacco Use:    I understand the use of any tobacco products increases my chance of suffering from future heart disease or stroke and could cause other illnesses which may shorten my life. Quitting the use of tobacco products is the single most important thing I can do to improve my health. For further information on smoking / tobacco cessation call a Toll Free Quit Line at 1-659.339.7482 (*National Cancer [place]) or 1-936.701.3426 (American Lung Association) or you can access the web based program at www.lungusa.org.    [place] Tobacco Users Help Line:  (380) 377-8941       Toll Free: 1-751.588.7030  Quit Tobacco Program [facility] (565)564-5349    Crisis Hotline:    [place] Crisis Hotline:  7-015-UJLQNEY or 1-316.351.4799    [place] Crisis Hotline:    1-642.948.1807 or 916-637-5406    Discharge Survey:   Thank you for choosing [facility]. We hope we did everything we could to make your hospital stay a pleasant one. You may be receiving a phone survey and we would appreciate your time and participation in answering the questions. Your input is very valuable to us in our efforts to improve our service to our patients and their families.        My signature on this form indicates that:    1. I have reviewed and understand the above information.  2. My questions regarding this information have been answered to my satisfaction.  3. I have formulated a plan with my discharge nurse to obtain my prescribed medications for home.                  Disclaimer         __________________________________                     __________       ________                       Patient Signature                                                 Date                    Time

## 2017-06-23 NOTE — ED NOTES
Pt's O2 sat down to 79% on 5L NC while sleeping. Pt easily arouses to voice. Pt placed on 10L oxymask, sats improved to 97%.

## 2017-06-23 NOTE — PROGRESS NOTES
"Pt agitated and states that he is frustrated with rules (such as using the urinal for strict I&O). Pt states \"i've been dealing with this for 10 years and if I wanted to walk out of here right now I could\". Pt up to chair and refusing chair alarm. Son at bedside. Pt educated on importance of care and safety for him. Charge RN Connie ORELLANA informed of pt statements. Pt close to RN station, call light and personal belongings within reach.  "

## 2017-06-23 NOTE — PROGRESS NOTES
Cardiology consultation preliminary note  The patient was seen and examined  Recommend  1. Continue IV amiodarone for now but will probably discontinue and monitor.  2. May need EP consultation  3. Serial troponin levels pending on the results would optimally like to have the patient undergo an MPI but weight may preclude that.  4. Check magnesium  5. IV Lasix twice a day and an potassium supplements.  6. Pulmonary consultation for CPAP therapy initiation  7. Thyroid function tests.  8. Vitamin D supplements for vitamin D deficiency.  9. Continue preadmission medications for hypertension and gout

## 2017-06-23 NOTE — PROCEDURES
Clinical Comments:  The patient underwent a comprehensive polysomnogram using the standard montage for measurement of parameters of sleep, respiratory events, movement abnormalities, heart rate and rhythm. A microphone was used to monitor snoring.      INTERPRETATION:  The total recording time was 70.3 minutes with a sleep period of 70.2 minutes and the total sleep time was 57.5 minutes with a sleep efficiency of 81.8%.  The sleep latency was 0.1 minutes, and REM latency was 51.0 minutes.  The patient experienced 46 arousals in total, for an arousal index of 48.0    RESPIRATORY: The patient had 64 apneas in total.  Of these, 64 were obstructive apneas, and 0 were central apneas.  This resulted in an apnea index (AI) of 66.8.  The patient had 6 hypopneas, for a hypopnea index of 6.3.  The overall AHI was 73.0, while the AHI during Stage R sleep was 32.7.  AHI while supine was 0.0.    OXIMETRY: Oxygen saturation monitoring showed a mean SpO2 of 71.2%, with a minimum oxygen saturation of 29.0%.  Oxygen saturations were less than or = 89% for 54.2 minutes of sleep time.    CARDIAC: The highest heart rate during the recording was 149.0 beats per minute.  The average heart rate during sleep was 86.6 bpm.    LIMB MOVEMENTS: There were a total of 0 PLMs during sleep, of which 0 were PLMs arousals.  This resulted in a PLMS index of 0.0.    The patient presented for overnight polysomnography for evaluation of sleep-disordered breathing. A total of 70 minutes of recording time was obtained, with sleep stages showing reduced stage III and REM sleep. Sleep latency was reduced at <1  minute. Baseline hypoxia was noted with mean SPO2 71%. The patient immediately fell asleep and manifested loud snoring and obstructive respiratory events, with desaturations noted to 29%. The overall AHI was 73 events per hour. He was started on supplemental oxygen. Frequent premature ventricular contractions were noted from the start of the study,  with progressive ventricular ectopy leading to ventricular tachycardia. At that point, the sleep study was terminated and the patient was transferred via [facility] to [facility] ER for treatment.    Impression:  Severe baseline hypoxemia, secondary to obesity hypoventilation.  Severe obstructive sleep apnea syndrome, with AHI 73 events per hour.  Ventricular tachycardia.    Recommendations:  The sleep study was not completed as the patient developed ventricular tachycardia and was transferred via REMSA to ER for treatment.  During the >1 hour of sleep study tested, there was evidence of very severe obstructive sleep apnea syndrome, AHI 73 events per hour, associated with severe hypoxemia. CPAP therapy with oxygen bleed in recommended.

## 2017-06-23 NOTE — CONSULTS
DATE OF SERVICE:  06/23/2017    REASON FOR CONSULTATION:  Ventricular tachycardia.    REQUESTING PHYSICIAN:  Dr. Silvestre Nazario, ER physician.    HISTORY OF PRESENT ILLNESS:  The patient is a 41-year-old  male with   history of hypertension and an gout, was transferred from a sleep center where he was   undergoing a sleep study for evaluation of sleep apnea this evening for significant ventricular   ectopy including episodes of ventricular tachycardia. Intravenous amiodarone therapy was started.     The patient was not having any specific cardiac symptoms during the sleep study.  He   states that he feels fine and has had no recent illness.    The patient was seen recently by Dr. Simba Foster pulmonologist on 05/01/2017 after being   eferred for evaluation for sleep apnea by his primary care physician, Dr.Zachary Saunders.    The patient has suffered from chronic impaired sleep with chronic orthopnea, daytime fatigue,  impaired exercise tolerance and hypersomnolence. The patient has been started on daily Lasix   for lower extremity edema which was increased to twice a day.    The patient has a history of hypertension treated for the past 8 years with amlodipine and losartan.    The patient denies a history of diabetes mellitus or hyperlipidemia.  The   patient does not smoke cigarettes, but chews tobacco.  He drinks approximately   6 cans of beer a week.  He denies any illicit drug use.    Recently, in February, he underwent a ventral hernia repair with omentectomy,   during which time, he had significant hypoxia requiring overnight observation   and hospitalization.  He was prescribed and has been prescribed nocturnal   oxygen, but does not use it.    The patient denies any chest pain, palpitations or syncope.    ALLERGIES:  TO MORPHINE.    MEDICATIONS PRIOR TO ADMISSION:    1. Lasix 40 mg b.i.d.  2. Amlodipine dose unknown.  3. Losartan daily dose unknown  4. Allopurinol 100 mg daily.    MEDICAL  HISTORY  1. Hypertension.  2. Gout.    SURGICAL HISTORY  1. Ventral hernia repair with omentectomy, 02/08/2017.  2.  Right hip acetabular fracture dislocation, pending placement. 12/01/2007.    FAMILY HISTORY: Mother suffered a heart attack at age 55.    SOCIAL HISTORY: Lives in [place], [place]. Commutes to [place], [place].    REVIEW OF SYSTEMS:  GENERAL:  Denies fevers, chills, or sweats.  RESPIRATORY:  As above.  No history of DVT or pulmonary emboli.  CARDIAC:  As above. No history of coronary disease.  GASTROINTESTINAL:  Denies nausea, vomiting, diarrhea, peptic ulcer disease,   gallbladder or liver disease.  GENITOURINARY:  He was seen by a nephrologist for protein in his urine.  He   states that he was given a recent clean bill of health regarding his renal function.  NEUROLOGIC:  Denies stroke or seizures.  ENDOCRINE:  No thyroid disease.  MUSCULOSKELETAL:  Gout.  EYES:  No acute visual disturbances.  SKIN:  No rashes.    PHYSICAL EXAMINATION:  VITAL SIGNS:  Blood pressure 112/64, pulse 81, temperature 98.8.  GENERAL:  Alert, no respiratory distress.  HEENT:  Extraocular movements are intact.  Nonicteric sclerae.  Oropharynx,   fair dentition.  NECK:  Thick, JVP difficult to assess, 1+ carotid pulses.  CHEST:  Marked increased AP diameter.  LUNGS:  Diminished breath sounds.  No wheezes, rales, or rhonchi.  CARDIAC:  Distant heart sounds.  Regular rate and rhythm.  No murmurs, gallops   or rubs.  ABDOMEN:  Massively obese, nontender, no guarding.  Healed vertical infraumbilical  scar.  EXTREMITIES:  Severe bilateral 4+ pitting pretibial and pedal edema.  NEUROLOGIC:  No lateralizing findings.  Alert and oriented.  SKIN:  Warm and dry.  PULSES: 2+ radial pulses one plus carotid pulses. Femoral and pedal pulses not accessible.    LABORATORY DATA: 06/23/2017, WBC 9100, hemoglobin 19, platelets 238,000.    Sodium 138, potassium 3.9, BUN 19, creatinine 1.12.  Troponin 0.04 PTT   12.9.      CXR on 06/23/2017  consistent with mild pulmonary edema.    Echocardiogram on 06/09/2017, technically difficult, normal left ventricular   ejection fraction of 60%, mild right ventricular enlargement with normal right   ventricular function.    EKG on 06/23/2017, normal sinus rhythm, left axis deviation, nonspecific ST-T   wave abnormalities, complex ventricular ectopy.    ASSESSMENT:  1. Pickwickian syndrome, obesity hypoventilation syndrome complicated by severe  sleep apnea, polycythemia, nonsustained ventricular tachycardia and right heart failure  with pulmonary edema.  2.  Nonsustained ventricular tachycardia on IV amiodarone  3.  Severe sleep apnea syndrome.  4.  Massive morbid obesity.  5.  Severe right heart failure.  6.  Polycythemia.  7.  Vitamin D deficiency is clear with elevated PTH, parathyroid hormone    PLAN:  1.  Admit to telemetry.  2.  Discontinue IV amiodarone  3.  Start IV diuresis.  4.  Serial troponin levels, magnesium level, thyroid function tests.  5.  Pulmonary consultation for initiation of CPAP therapy on this admission.  6.  Vitamin D supplement  7.  Continue preadmission medications for hypertension as tolerated.  8.  Optimally the patient should have a myocardial perfusion stress test however the patient's weight may preclude being able to perform this study.   9.  Will make further recommendations based on the patient's clinical course   and the above evaluation.    Thank you for allowing me to see this gentleman in consultation.       ____________________________________     MD ENE NORTH / ROBERTO    DD:  06/23/2017 04:40:35  DT:  06/23/2017 07:08:09    D#:  [id number]  Job#:  [id number]

## 2017-06-23 NOTE — IP AVS SNAPSHOT
freshbag Access Code: Activation code not generated  Current freshbag Status: Active    [employer]MediaNetwork[employer]  A secure, online tool to manage your health information     [employer]’s freshbag® is a secure, online tool that connects you to your personalized health information from the privacy of your home -- day or night - making it very easy for you to manage your healthcare. Once the activation process is completed, you can even access your medical information using the freshbag myriam, which is available for free in the Apple Myriam store or Google Play store.     freshbag provides the following levels of access (as shown below):   My Chart Features   [facility] Primary Care Doctor [facility]  Specialists [facility]  Urgent  Care Non-[facility]  Primary Care  Doctor   Email your healthcare team securely and privately 24/7 X X X X   Manage appointments: schedule your next appointment; view details of past/upcoming appointments X      Request prescription refills. X      View recent personal medical records, including lab and immunizations X X X X   View health record, including health history, allergies, medications X X X X   Read reports about your outpatient visits, procedures, consult and ER notes X X X X   See your discharge summary, which is a recap of your hospital and/or ER visit that includes your diagnosis, lab results, and care plan. X X       How to register for freshbag:  1. Go to  [email or web address].  2. Click on the Sign Up Now box, which takes you to the New Member Sign Up page. You will need to provide the following information:  a. Enter your freshbag Access Code exactly as it appears at the top of this page. (You will not need to use this code after you’ve completed the sign-up process. If you do not sign up before the expiration date, you must request a new code.)   b. Enter your date of birth.   c. Enter your home email address.   d. Click Submit, and follow the next screen’s instructions.  3. Create a freshbag ID. This will  be your [employer] login ID and cannot be changed, so think of one that is secure and easy to remember.  4. Create a [employer] password. You can change your password at any time.  5. Enter your Password Reset Question and Answer. This can be used at a later time if you forget your password.   6. Enter your e-mail address. This allows you to receive e-mail notifications when new information is available in [employer].  7. Click Sign Up. You can now view your health information.    For assistance activating your [employer] account, call [phone]

## 2017-06-23 NOTE — ED NOTES
Pt removed CPAP. Pt educated about importance of CPAP. Pt holding CPCP near face but refuses to put it back on. Pt stated that he is trying to get used to it, became agitated w/ this RN. Sating 95% at this time.

## 2017-06-23 NOTE — ED NOTES
RT at bedside for BIPAP placement. Pt will only be using BIPAP while sleeping. OK per RT to go to Tele. Tele charge RN notified.

## 2017-06-23 NOTE — PROGRESS NOTES
[facility] Hospitalist Progress Note    Date of Service: 2017    Chief Complaint  41 y.o. male admitted 2017 sent from sleep center last night, as patient had periods of apnea with noted significant arrhythmias. There were documented episodes of sinus tachycardia and multiple episodes of PVCs, bigeminy, trigeminy and then episode of nonsustained ventricular tachycardia.     Interval Problem Update  Admitted this am by Dr Shoemaker. Here in ICU now and failed CPAP and on BIPAP/APAP.  Pulmonary consulting as is cardiology.  Off amiodarone drip this am per cardiology.     Consultants/Specialty  Pulmonary  Cardiology    Disposition  Home.        Review of Systems   Unable to perform ROS: acuity of condition      Physical Exam  Laboratory/Imaging   Hemodynamics  Temp (24hrs), Av.6 °C (97.9 °F), Min:36.3 °C (97.3 °F), Max:37.1 °C (98.8 °F)   Temperature: 36.7 °C (98 °F)  Pulse  Av.2  Min: 65  Max: 92 Heart Rate (Monitored): 76  Blood Pressure: 138/54 mmHg, NIBP: 138/88 mmHg      Respiratory      Respiration: 20, Pulse Oximetry: 95 %, O2 Daily Delivery Respiratory : OxyMask        RUL Breath Sounds: Diminished, RML Breath Sounds: Diminished, RLL Breath Sounds: Diminished, JEET Breath Sounds: Diminished, LLL Breath Sounds: Diminished    Fluids    Intake/Output Summary (Last 24 hours) at 17 1931  Last data filed at 17 1800   Gross per 24 hour   Intake    650 ml   Output   2275 ml   Net  -1625 ml       Nutrition  Orders Placed This Encounter   Procedures   • Diet Order     Standing Status: Standing      Number of Occurrences: 1      Standing Expiration Date:      Order Specific Question:  Diet:     Answer:  Cardiac [6]     Physical Exam   Constitutional: No distress.   obese   Skin: He is not diaphoretic.       Recent Labs      17   0055   WBC  9.1   RBC  6.56*   HEMOGLOBIN  19.7*   HEMATOCRIT  61.7*   MCV  94.1   MCH  30.0   MCHC  31.9*   RDW  50.7*   PLATELETCT  238   MPV  10.1     Recent Labs       06/23/17   0055   SODIUM  138   POTASSIUM  3.9   CHLORIDE  100   CO2  28   GLUCOSE  108*   BUN  19   CREATININE  1.12   CALCIUM  9.1     Recent Labs      06/23/17   0055   INR  0.94         Recent Labs      06/23/17   0055   TRIGLYCERIDE  122   HDL  38*   LDL  99          Assessment/Plan     * Ventricular tachycardia (CMS-HCC)  Assessment & Plan  Cardiology consulting  Correct electrolytes  Monitor vitals/telemetry  Check Echo, TSH  Lasix.       HTN (hypertension) (present on admission)  Assessment & Plan  Monitor vitals.    Sleep apnea  Assessment & Plan  APAP  Pulmonary consulting.  Monitor SpO2, ABG, vitals, labs.    Polycythemia  Assessment & Plan  Needs phlebotomy  Monitor CBC  Due to DARIO.      Radiology images reviewed, Labs reviewed and Medications reviewed

## 2017-06-23 NOTE — ED NOTES
Pt BIB by EMS for runs of vtach. Pt was at an outpt sleep study. They noticed frequent bigem, trigem, and burst of vtach. Pt received 150mg/100mLs amio drip. Pt is asymptomatic. Pt has no hx of cardiac issues.

## 2017-06-23 NOTE — CARE PLAN
Problem: Oxygenation/Respiratory Function  Goal: Patient will Achieve/Maintain Optimum Respiratory Rate/Effort  Outcome: PROGRESSING AS EXPECTED  Intervention: Assess/Monitor Rate/Rhythm/Depth of effort of respirations  Pt connected to icu monitor at all time. Pt visually assessed.   Intervention: Administer/Titrate Oxygen as Ordered  Pt on bipap while sleeping, nasal canula or oxymask 4 L while awake.   Intervention: Position Patient for Maximum Ventilatory Efficiency  Pt in chair or head of bed 30 degrees.       Problem: Hemodynamic Status  Goal: Vital Signs and Fluid Balance Management  Outcome: PROGRESSING AS EXPECTED  occasional PVC.   Intervention: Cardiac Monitoring, Pulse Oximetry  Pt connected to monitor at all times.   Intervention: Monitor I & O, Manage IV fluids and IV infusions  Pt on 1200 cc fluid restriction for 24 hr. Pt educated and aware. Pt educated on why he has to pee in a urinal.

## 2017-06-23 NOTE — IP AVS SNAPSHOT
" <p align=\"LEFT\"><IMG SRC=\"//[email or web address]$/[email or web address]\" alt=\"Image\" WIDTH=\"50%\" HEIGHT=\"200\" BORDER=\"\"></p>                   Name:Lionel Zacarias  Medical Record Number:[id number]  CSN: [id number]    YOB: 1975   Age: 41 y.o.  Sex: male  HT:1.905 m (6' 3\") WT: 216.9 kg (478 lb 2.8 oz)          Admit Date: 2017     Discharge Date:   Today's Date: 2017  Attending Doctor:  Tom Christian D.O.                  Allergies:  Morphine and Pcn          Your appointments     Jul 10, 2017  1:00 PM   Follow UP with Simba Foster M.D.   [facility] (--)    [address]  [address]  [place] [address] [phone]            2017 10:30 AM   New Patient with Mike Lorenz RD   [facility])    [address]  [address]  [place] [id number] [phone]           It is the patient's responsibility to check with your Insurance for benefit coverage for visit / visits.  24 hours notice is required for all appointment changes or cancellation.  Please arrive 20 min. before your appointment time  Please bring the following with you: 1)Picture Id 2) Insurance card 3) Completed Forms if New Patient  If scheduled for DIABETES VISIT please also brin) Medications 2) Meter 3) Blood glucose logs 4) Any recent labs if you have them  If scheduled for NUTRITION VISIT please also brin) 2-3 days of detailed food intake logs 2) Blood glucose monitor and blood glucose logs (if you have them)              Follow-up Information     1. Follow up with Austen Saunders M.D..    Specialty:  Family Medicine    Contact information    [address]  [address]  [place] [address]  [phone]          2. Schedule an appointment as soon as possible for a visit in 1 week to follow up.         Medication List      Take these Medications        Instructions    allopurinol 300 MG Tabs   Commonly known as:  ZYLOPRIM    Take 300 mg by mouth every day.   "   Dose:  300 mg       carvedilol 6.25 MG Tabs   Commonly known as:  COREG    Take 1 Tab by mouth 2 times a day, with meals.   Dose:  6.25 mg       furosemide 40 MG Tabs   Commonly known as:  LASIX    Take 40 mg by mouth 2 Times a Day.   Dose:  40 mg       losartan 100 MG Tabs   Commonly known as:  COZAAR    Take 100 mg by mouth every day.   Dose:  100 mg       spironolactone 25 MG Tabs   Commonly known as:  ALDACTONE    Take 1 Tab by mouth every day.   Dose:  25 mg

## 2017-06-23 NOTE — H&P
DATE OF SERVICE:  06/23/2017    REASON FOR EVALUATION:  Arrhythmia.    PRIMARY CARE PHYSICIAN:  Dr. Saunders.  Dr. Don is patient's kidney   doctor.    HISTORY OF PRESENT ILLNESS:  A 41-year-old male was sent here from the sleep   study center, this patient has suspected sleep apnea and was getting a sleep   study, so he can have initiate CPAP or BiPAP at home.  During this study last   night, the patient had periods of apnea with noted significant arrhythmias.    There were documented episodes of sinus tachycardia and multiple episodes of   PVCs, bigeminy, trigeminy and then episode of nonsustained ventricular   tachycardia.  The sleep study was discontinued and the patient was transferred   to the emergency room for further recommendations.  IV amiodarone was   initiated and cardiology, Dr. Neely was consulted.  As per the patient,   he has never had any prior cardiac history to his knowledge.  Patient denies   any chest pain or palpitations.    REVIEW OF SYSTEMS:  No chest pain, no palpitations, no fever, no chills, no   cough.  No abdominal pain.  He does have leg swelling.  No swollen nodes,   generalized malaise.  No skin rash.  No dysuria, hematuria.  Other review of   systems is otherwise negative.    ALLERGIES:  TO MORPHINE.  HE ALSO HAS AN ALLERGY TO PENICILLINS.    OUTPATIENT MEDICATIONS:  Norvasc dose unknown, losartan dose unknown, both 1   tablet daily; Lasix 40 mg daily, oxycodone is no longer active, allopurinol   100 mg daily.    PAST MEDICAL HISTORY:  Includes gout, morbid obesity, sleep apnea,   osteoarthritis, leg edema, hypertension, chronic kidney disease stage I.    PAST SURGICAL HISTORY:  Ventral hernia repair, hip pinning.    SOCIAL HISTORY:  Does not smoke.  Drinks alcohol about 6 cans of beer per   week, no illicit drugs.  Patient lives in [place].    PHYSICAL EXAMINATION:  VITAL SIGNS:  Blood pressure 138/54, respiration 12, pulse 81, temperature   37.1.  GENERAL:  A morbidly  obese male, not in acute distress.  He is sleepy.  HEENT:  Extraocular movement intact.  Oral mucosa is moist.  NECK:  Supple.  No adenopathy appreciated in cervical or axillary area.  HEART:  S1, S2, irregular.  No murmurs appreciated.  LUNGS:  Decreased breath sounds at the bases.  ABDOMEN:  Distended, soft, nontender, positive bowel sounds appreciated.  EXTREMITIES:  He has +2 pitting edema in both lower extremities with evidence   of chronic venous stasis changes.  NEUROLOGIC:  He is alert, awake, oriented, no focal deficits.    LABORATORY DATA:  White cell count of 9, hemoglobin of _____ platelet count of   238.  Sodium 138, potassium 3.9, BUN 19, creatinine of 1, troponin 0.04.  EKG   reviewed by me shows sinus rhythm with evidence of PVCs and bigeminy.  X-ray   of chest shows pulmonary edema and cardiomegaly.  Echocardiogram done on   6/9/2017 shows EF 60%.    IMPRESSION:  1.  Nonsustained ventricular tachycardia.  2.  Bigeminy.  3.  Sinus tachycardia.  4.  Sleep apnea.  5.  Morbid obesity.  6.  Pulmonary edema.  7.  History of gout.  8.  History of hypertension.    PLAN:  Patient will be admitted and anticipate 2-midnight stay for this   patient.  Patient already had an echocardiogram less than 2 weeks ago.  We   will defer to cardiology if they want to repeat.  Check troponins x3.    Initiate BiPAP at night.  Monitor on telemetry.  Patient is on IV amiodarone   that will be continued until cardiology says otherwise.  Diurese him with   Lasix 20 mg IV q.12 hours and K-Dur 20 mEq b.i.d.  Lovenox for DVT   prophylaxis.  The patient's other hypertensive medication of Norvasc and   losartan have been held because we will be aggressively diuresing during   hospital stay.  Patient's past medical history is complicated.  Medical   decision making is high.  Again, I anticipate 2-midnight stay for this   patient.       ____________________________________     MD PENNY BROWN / ROBERTO    DD:  06/23/2017  04:31:42  DT:  06/23/2017 05:39:33    D#:  [id number]  Job#:  [id number]

## 2017-06-23 NOTE — ED PROVIDER NOTES
ED Provider Note    CHIEF COMPLAINT  Chief Complaint   Patient presents with   • Irregular Heart Beat     constant bigem, trigem, and burst of vtach       HPI  Lionel Zacarias is a 41 y.o. male who presents to the emergency department for abnormal EKG. Past medical history significant for morbid obesity, sleep apnea, hypertension. The patient's visit he was undergoing a sleep study to have his sleep apnea evaluated when while on the monitor it was noted that he had an abnormal EKG. Patient states that he recalls being awoken and told that his heart was an abnormal rhythm and EMS was called. He denies any other symptoms. Denies any current symptoms. Says he is otherwise been feeling well. EMS reports that on their monitor that they initially had SVT which then changed into a monomorphic V. tach with pulses. For this reason they started amiodarone drip. The frequency of his PVCs decreased to a trigeminy.    REVIEW OF SYSTEMS  See HPI for further details. All other systems are negative.     PAST MEDICAL HISTORY   has a past medical history of GOUT (12/2/2010); Snoring; Arthritis; HTN (hypertension) (12/2/2010); and Hypertension.    SOCIAL HISTORY  Social History     Social History Main Topics   • Smoking status: Never Smoker    • Smokeless tobacco: Current User     Types: Chew   • Alcohol Use: 3.0 oz/week     6 Cans of beer per week      Comment: 2 per week   • Drug Use: No   • Sexual Activity: Not Currently       SURGICAL HISTORY   has past surgical history that includes other orthopedic surgery; ventral hernia repair (N/A, 2/8/2017); omentectomy (N/A, 2/8/2017); and hip replacement, total.    CURRENT MEDICATIONS  Home Medications     Reviewed by Mikey Mobley R.N. (Registered Nurse) on 06/23/17 at 0050  Med List Status: Not Addressed    Medication Last Dose Status    allopurinol (ZYLOPRIM) 100 MG Tab  Active    AMLODIPINE BESYLATE PO  Active    furosemide (LASIX) 40 MG Tab  Active    LOSARTAN POTASSIUM PO   "Active    oxycodone immediate release (ROXICODONE) 10 MG immediate release tablet not taking Active                ALLERGIES  Allergies   Allergen Reactions   • Morphine Unspecified     Nightmares terrors    • Pcn [Penicillins]        PHYSICAL EXAM  VITAL SIGNS: /54 mmHg  Pulse 81  Temp(Src) 37.1 °C (98.8 °F)  Resp 29  Ht 1.905 m (6' 3\")  Wt 217.727 kg (480 lb)  BMI 60.00 kg/m2  SpO2 95% @RIMA[[id number]::@   Pulse ox interpretation: I interpret this pulse ox as normal.  Constitutional: Alert in no apparent distress. Morbid obesity   HENT: No signs of trauma, Bilateral external ears normal, Nose normal.   Eyes: Pupils are equal and reactive, Conjunctiva normal, Non-icteric.   Neck: Normal range of motion, No tenderness, Supple, No stridor.   Lymphatic: No lymphadenopathy noted.   Cardiovascular: Regular rate and rhythm, no murmurs.   Thorax & Lungs: Normal breath sounds, No respiratory distress, No wheezing, No chest tenderness.   Abdomen: Bowel sounds normal, Soft, No tenderness, No masses, No pulsatile masses. No peritoneal signs.  Skin: Warm, Dry, No erythema, No rash.   Back: No bony tenderness, No CVA tenderness.   Extremities: Intact distal pulses, No edema, No tenderness, No cyanosis,  Negative Kedar's sign.   Musculoskeletal: Good range of motion in all major joints. No tenderness to palpation or major deformities noted.   Neurologic: Alert , Normal motor function, Normal sensory function, No focal deficits noted.   Psychiatric: Affect normal, Judgment normal, Mood normal.       DIAGNOSTIC STUDIES / PROCEDURES    EKG  Sinus rhythm at a rate of 97, trigeminy, no acute ST changes appreciable    LABS  Results for orders placed or performed during the hospital encounter of 06/23/17   CBC w/ Differential   Result Value Ref Range    WBC 9.1 4.8 - 10.8 K/uL    RBC 6.56 (H) 4.70 - 6.10 M/uL    Hemoglobin 19.7 (H) 14.0 - 18.0 g/dL    Hematocrit 61.7 (H) 42.0 - 52.0 %    MCV 94.1 81.4 - 97.8 fL    MCH 30.0 " 27.0 - 33.0 pg    MCHC 31.9 (L) 33.7 - 35.3 g/dL    RDW 50.7 (H) 35.9 - 50.0 fL    Platelet Count 238 164 - 446 K/uL    MPV 10.1 9.0 - 12.9 fL    Neutrophils-Polys 66.30 44.00 - 72.00 %    Lymphocytes 23.30 22.00 - 41.00 %    Monocytes 7.20 0.00 - 13.40 %    Eosinophils 2.20 0.00 - 6.90 %    Basophils 0.70 0.00 - 1.80 %    Immature Granulocytes 0.30 0.00 - 0.90 %    Nucleated RBC 0.00 /100 WBC    Neutrophils (Absolute) 6.06 1.82 - 7.42 K/uL    Lymphs (Absolute) 2.13 1.00 - 4.80 K/uL    Monos (Absolute) 0.66 0.00 - 0.85 K/uL    Eos (Absolute) 0.20 0.00 - 0.51 K/uL    Baso (Absolute) 0.06 0.00 - 0.12 K/uL    Immature Granulocytes (abs) 0.03 0.00 - 0.11 K/uL    NRBC (Absolute) 0.00 K/uL   Complete Metabolic Panel (CMP)   Result Value Ref Range    Sodium 138 135 - 145 mmol/L    Potassium 3.9 3.6 - 5.5 mmol/L    Chloride 100 96 - 112 mmol/L    Co2 28 20 - 33 mmol/L    Anion Gap 10.0 0.0 - 11.9    Glucose 108 (H) 65 - 99 mg/dL    Bun 19 8 - 22 mg/dL    Creatinine 1.12 0.50 - 1.40 mg/dL    Calcium 9.1 8.5 - 10.5 mg/dL    AST(SGOT) 22 12 - 45 U/L    ALT(SGPT) 28 2 - 50 U/L    Alkaline Phosphatase 89 30 - 99 U/L    Total Bilirubin 0.7 0.1 - 1.5 mg/dL    Albumin 3.9 3.2 - 4.9 g/dL    Total Protein 6.9 6.0 - 8.2 g/dL    Globulin 3.0 1.9 - 3.5 g/dL    A-G Ratio 1.3 g/dL   Troponin STAT   Result Value Ref Range    Troponin I 0.04 0.00 - 0.04 ng/mL   PT/INR   Result Value Ref Range    PT 12.9 12.0 - 14.6 sec    INR 0.94 0.87 - 1.13   ESTIMATED GFR   Result Value Ref Range    GFR If African American >60 >60 mL/min/1.73 m 2    GFR If Non African American >60 >60 mL/min/1.73 m 2   EKG (NOW)   Result Value Ref Range    Report       [facility] Emergency Dept.    Test Date:  2017  Pt Name:    MARCO FONTENOTDRIDGE              Department: ER  MRN:        [id number]                      Room:       [facility]  Gender:     M                            Technician: [id number]  :        1975                   Requested  By:BULL MCKEON JACQUES  Order #:    [id number]                    Reading MD:    Measurements  Intervals                                Axis  Rate:       97                           P:  W[place]:                                      QRS:        -36  QRSD:       112                          T:          56  QT:         388  QTc:        493    Interpretive Statements  ATRIAL FLUTTER, A-RATE 271  PAIRED VENTRICULAR PREMATURE COMPLEXES  NONSPECIFIC IVCD WITH LAD  Compared to ECG 02/03/2017 15:55:18  Ventricular premature complex(es) now present  Intraventricular conduction delay now present  Sinus rhythm no longer present  Left anterior fascicular block no longer present  Incomplete right bundle-branch bl ock no longer present  Right bundle-branch block no longer present           RADIOLOGY  DX-CHEST-PORTABLE (1 VIEW)   Final Result      Findings consistent with mild pulmonary edema, likely cardiogenic.        Total critical care time 35 minutes      COURSE & MEDICAL DECISION MAKING  Pertinent Labs & Imaging studies reviewed. (See chart for details)  Patient presented emergency department for cardiac arrhythmia. History and physical exam as above. Patient was immediately placed on monitoring. Given the EMS strips as reviewed by myself showing both SVT as well as nonsustained V. tach I have kept the patient on cardiac monitoring and I have continued the amiodarone drip as it does seem to help twin the current dysrhythmia. I emergently discussed the case with cardiology on-call which is agreeable to ongoing inpatient care. After initial screening acute evaluation I have discussed the case with the hospital service as well which is agreeable to ongoing inpatient care at this time alongside with cardiology as the consultants.        FINAL IMPRESSION  1. SVT (supraventricular tachycardia)    2. Ventricular tachycardia, nonsustained (CMS-HCC)    3. Trigeminy    4. Cardiogenic pulmonary edema (CMS-HCC)    5. Sleep apnea, unspecified type             Electronically signed by: Silvestre Nazario, 6/23/2017 12:58 AM

## 2017-06-24 VITALS
HEIGHT: 75 IN | WEIGHT: 315 LBS | SYSTOLIC BLOOD PRESSURE: 138 MMHG | RESPIRATION RATE: 18 BRPM | BODY MASS INDEX: 39.17 KG/M2 | DIASTOLIC BLOOD PRESSURE: 54 MMHG | OXYGEN SATURATION: 93 % | HEART RATE: 86 BPM | TEMPERATURE: 97.4 F

## 2017-06-24 PROBLEM — E66.01 MORBID OBESITY (HCC): Status: ACTIVE | Noted: 2017-06-24

## 2017-06-24 PROBLEM — I47.20 VENTRICULAR TACHYCARDIA (HCC): Status: RESOLVED | Noted: 2017-06-23 | Resolved: 2017-06-24

## 2017-06-24 LAB
ANION GAP SERPL CALC-SCNC: 6 MMOL/L (ref 0–11.9)
BUN SERPL-MCNC: 15 MG/DL (ref 8–22)
CALCIUM SERPL-MCNC: 8.3 MG/DL (ref 8.5–10.5)
CHLORIDE SERPL-SCNC: 102 MMOL/L (ref 96–112)
CO2 SERPL-SCNC: 26 MMOL/L (ref 20–33)
CREAT SERPL-MCNC: 0.69 MG/DL (ref 0.5–1.4)
GFR SERPL CREATININE-BSD FRML MDRD: >60 ML/MIN/1.73 M 2
GLUCOSE SERPL-MCNC: 97 MG/DL (ref 65–99)
POTASSIUM SERPL-SCNC: 4.7 MMOL/L (ref 3.6–5.5)
SODIUM SERPL-SCNC: 134 MMOL/L (ref 135–145)
TROPONIN I SERPL-MCNC: 0.02 NG/ML (ref 0–0.04)

## 2017-06-24 PROCEDURE — 99233 SBSQ HOSP IP/OBS HIGH 50: CPT | Performed by: INTERNAL MEDICINE

## 2017-06-24 PROCEDURE — 700111 HCHG RX REV CODE 636 W/ 250 OVERRIDE (IP): Performed by: INTERNAL MEDICINE

## 2017-06-24 PROCEDURE — 84484 ASSAY OF TROPONIN QUANT: CPT

## 2017-06-24 PROCEDURE — 700111 HCHG RX REV CODE 636 W/ 250 OVERRIDE (IP): Performed by: HOSPITALIST

## 2017-06-24 PROCEDURE — A9270 NON-COVERED ITEM OR SERVICE: HCPCS | Performed by: INTERNAL MEDICINE

## 2017-06-24 PROCEDURE — 700102 HCHG RX REV CODE 250 W/ 637 OVERRIDE(OP): Performed by: INTERNAL MEDICINE

## 2017-06-24 PROCEDURE — 99195 PHLEBOTOMY: CPT

## 2017-06-24 PROCEDURE — A9270 NON-COVERED ITEM OR SERVICE: HCPCS | Performed by: HOSPITALIST

## 2017-06-24 PROCEDURE — 700102 HCHG RX REV CODE 250 W/ 637 OVERRIDE(OP): Performed by: HOSPITALIST

## 2017-06-24 PROCEDURE — 80048 BASIC METABOLIC PNL TOTAL CA: CPT

## 2017-06-24 PROCEDURE — 99239 HOSP IP/OBS DSCHRG MGMT >30: CPT | Performed by: HOSPITALIST

## 2017-06-24 RX ORDER — LOSARTAN POTASSIUM 25 MG/1
100 TABLET ORAL
Status: DISCONTINUED | OUTPATIENT
Start: 2017-06-24 | End: 2017-06-24 | Stop reason: HOSPADM

## 2017-06-24 RX ORDER — CARVEDILOL 6.25 MG/1
6.25 TABLET ORAL 2 TIMES DAILY WITH MEALS
Qty: 60 TAB | Refills: 4 | Status: ON HOLD | OUTPATIENT
Start: 2017-06-24 | End: 2017-12-20

## 2017-06-24 RX ORDER — SPIRONOLACTONE 25 MG/1
25 TABLET ORAL DAILY
Qty: 30 TAB | Refills: 0 | Status: SHIPPED | OUTPATIENT
Start: 2017-06-24 | End: 2017-06-24

## 2017-06-24 RX ORDER — SPIRONOLACTONE 25 MG/1
25 TABLET ORAL DAILY
Qty: 30 TAB | Refills: 1 | Status: SHIPPED | OUTPATIENT
Start: 2017-06-24

## 2017-06-24 RX ORDER — LOSARTAN POTASSIUM 25 MG/1
50 TABLET ORAL
Status: DISCONTINUED | OUTPATIENT
Start: 2017-06-24 | End: 2017-06-24

## 2017-06-24 RX ORDER — SPIRONOLACTONE 25 MG/1
25 TABLET ORAL
Status: DISCONTINUED | OUTPATIENT
Start: 2017-06-24 | End: 2017-06-24 | Stop reason: HOSPADM

## 2017-06-24 RX ORDER — CARVEDILOL 6.25 MG/1
6.25 TABLET ORAL 2 TIMES DAILY WITH MEALS
Status: DISCONTINUED | OUTPATIENT
Start: 2017-06-24 | End: 2017-06-24 | Stop reason: HOSPADM

## 2017-06-24 RX ADMIN — LOSARTAN POTASSIUM 100 MG: 25 TABLET, FILM COATED ORAL at 13:09

## 2017-06-24 RX ADMIN — POTASSIUM CHLORIDE 20 MEQ: 1500 TABLET, EXTENDED RELEASE ORAL at 10:09

## 2017-06-24 RX ADMIN — SPIRONOLACTONE 25 MG: 25 TABLET, FILM COATED ORAL at 13:09

## 2017-06-24 RX ADMIN — CARVEDILOL 6.25 MG: 6.25 TABLET, FILM COATED ORAL at 13:08

## 2017-06-24 RX ADMIN — ALLOPURINOL 100 MG: 100 TABLET ORAL at 10:00

## 2017-06-24 RX ADMIN — FUROSEMIDE 40 MG: 10 INJECTION, SOLUTION INTRAVENOUS at 06:00

## 2017-06-24 RX ADMIN — MAGNESIUM GLUCONATE 500 MG ORAL TABLET 400 MG: 500 TABLET ORAL at 10:00

## 2017-06-24 RX ADMIN — ENOXAPARIN SODIUM 40 MG: 100 INJECTION SUBCUTANEOUS at 10:00

## 2017-06-24 ASSESSMENT — ENCOUNTER SYMPTOMS
NERVOUS/ANXIOUS: 0
EYE DISCHARGE: 0
STRIDOR: 0
HEADACHES: 0
NAUSEA: 0
CHILLS: 0
DEPRESSION: 0
DIARRHEA: 0
FEVER: 0
SHORTNESS OF BREATH: 0
SPEECH CHANGE: 0
DIZZINESS: 0
COUGH: 0
VOMITING: 0
SENSORY CHANGE: 0
PALPITATIONS: 0
ORTHOPNEA: 0
BACK PAIN: 0
ABDOMINAL PAIN: 0

## 2017-06-24 ASSESSMENT — LIFESTYLE VARIABLES
CONSUMPTION TOTAL: INCOMPLETE
TOTAL SCORE: 0
DO YOU DRINK ALCOHOL: YES
EVER_SMOKED: YES
EVER FELT BAD OR GUILTY ABOUT YOUR DRINKING: NO
TOTAL SCORE: 0
EVER_SMOKED: YES
TOTAL SCORE: 0
HAVE YOU EVER FELT YOU SHOULD CUT DOWN ON YOUR DRINKING: NO
HAVE PEOPLE ANNOYED YOU BY CRITICIZING YOUR DRINKING: NO
EVER HAD A DRINK FIRST THING IN THE MORNING TO STEADY YOUR NERVES TO GET RID OF A HANGOVER: NO

## 2017-06-24 ASSESSMENT — PAIN SCALES - GENERAL
PAINLEVEL_OUTOF10: 0

## 2017-06-24 NOTE — DISCHARGE PLANNING
Per NIXON Jurado request, Referral sent to [facility] at 1500.  Spoke with Norma at [facility], she states they do not do BI-Pap as a hospital discharge.  It will take a few days to get insurance authorization.  I have faxed Dr. Luna's RX to her, per Norma she states insurance decline referral until sleep study is completed. NIXON Jurado advised.,

## 2017-06-24 NOTE — CONSULTS
"Critical Care/Pulmonary Consultation    Date of service: 6/23/2017    Consulting Physician: Tom Christian D.O.    CC: DARIO, arrhythmia     History of Present Illness: Lionel Zacarias is a 41 y.o. M, h/o gout, obesity, htn; [facility] for [facility] and VT noted during study; patient asymptomatic, tired, denied CP; \"just like any other night.\"  Found to have A.flutter; placed on amio gtt; cardiology following.  Ongoing desats currently while sleeping on cpap; changed to bipap with improvement.    No current facility-administered medications on file prior to encounter.     Current Outpatient Prescriptions on File Prior to Encounter   Medication Sig Dispense Refill   • furosemide (LASIX) 40 MG Tab Take 40 mg by mouth 2 Times a Day.         Social History   Substance Use Topics   • Smoking status: Never Smoker    • Smokeless tobacco: Current User     Types: Chew   • Alcohol Use: 3.0 oz/week     6 Cans of beer per week      Comment: 2 per week        Past Medical History   Diagnosis Date   • GOUT 12/2/2010   • Snoring    • Arthritis      joints   • HTN (hypertension) 12/2/2010 2017 pt states fairly well controlled   • Hypertension    • At risk for sleep apnea        Past Surgical History   Procedure Laterality Date   • Other orthopedic surgery       r hip   • Ventral hernia repair N/A 2/8/2017     Procedure: VENTRAL HERNIA REPAIR W/MESH;  Surgeon: Daniel De Oliveira M.D.;  Location: SURGERY [facility];  Service:    • Omentectomy N/A 2/8/2017     Procedure: OMENTECTOMY;  Surgeon: Daniel De Oliveira M.D.;  Location: SURGERY [facility];  Service:    • Hip replacement, total         Allergies: Morphine and Pcn    Family History   Problem Relation Age of Onset   • Heart Disease Mother 55     MI   • Lung Disease Maternal Grandmother      COPD   • Heart Disease Maternal Grandfather      MI   • Sleep Apnea Brother        Filed Vitals:    06/23/17 0045 06/23/17 0047 06/23/17 0131 06/23/17 0200   Height: " "1.905 m (6' 3\")      Weight: 217.727 kg (480 lb)      Weight % change since last entry.: 0 %      BP:  138/54     Pulse:  81 92 85   BMI (Calculated): 60      Resp:  12 20 18   Temp:  37.1 °C (98.8 °F)      06/23/17 0231 06/23/17 0301 06/23/17 0331 06/23/17 0541   Height:       Weight:       Weight % change since last entry.:       BP:       Pulse: 81 80 81 69   BMI (Calculated):       Resp: 22 14 16 27   Temp:        06/23/17 0601 06/23/17 0632 06/23/17 0700 06/23/17 0731   Height:       Weight:       Weight % change since last entry.:       BP:       Pulse: 89 79 78 76   BMI (Calculated):       Resp: 18 17 16    Temp:        06/23/17 0800 06/23/17 0801 06/23/17 0803 06/23/17 0815   Height:       Weight:       Weight % change since last entry.:       BP:       Pulse: 72 75 74 82   BMI (Calculated):       Resp: 12  16 26   Temp:        06/23/17 0820 06/23/17 0830 06/23/17 0900 06/23/17 0915   Height:       Weight:  216.9 kg (478 lb 2.8 oz)     Weight % change since last entry.:  -0.38 %     BP:       Pulse: 75 82 70 75   BMI (Calculated):       Resp: 14 24 18 13   Temp:        06/23/17 0930 06/23/17 0945 06/23/17 1000 06/23/17 1100   Height:       Weight:       Weight % change since last entry.:       BP:       Pulse: 80 69 72 65   BMI (Calculated):       Resp: 21 19 18 18   Temp:  36.4 °C (97.6 °F)      06/23/17 1200 06/23/17 1300 06/23/17 1400 06/23/17 1500   Height:       Weight:       Weight % change since last entry.:       BP:       Pulse: 69 81 73 79   BMI (Calculated):       Resp: 19 23 19 19   Temp: 36.3 °C (97.3 °F)       06/23/17 1600 06/23/17 2000   Height:     Weight:     Weight % change since last entry.:     BP:     Pulse: 77    BMI (Calculated):     Resp: 20    Temp: 36.7 °C (98 °F) 36.6 °C (97.9 °F)       Physical Examination  General: obese, somnolent, no distress  Neuro/Psych: A/O x 4, NFE  HEENT: PERRLA, crowded o/p, class IV  CVS: RRR, distant  Respiratory: CTA, diminished  Abdomen/: soft, " adipose, nt  Extremities: !+ edema, no c/c  Skin: cool, dry, no rash    Recent Labs      06/23/17   0055   WBC  9.1   NEUTSPOLYS  66.30   LYMPHOCYTES  23.30   MONOCYTES  7.20   EOSINOPHILS  2.20   BASOPHILS  0.70   ASTSGOT  22   ALTSGPT  28   ALKPHOSPHAT  89   TBILIRUBIN  0.7     Recent Labs      06/23/17   0055   SODIUM  138   POTASSIUM  3.9   CHLORIDE  100   CO2  28   BUN  19   CREATININE  1.12   MAGNESIUM  1.9   CALCIUM  9.1     Recent Labs      06/23/17   0055   ALTSGPT  28   ASTSGOT  22   ALKPHOSPHAT  89   TBILIRUBIN  0.7   GLUCOSE  108*           Invalid input(s): [id number]  DX-CHEST-PORTABLE (1 VIEW)   Final Result      Findings consistent with mild pulmonary edema, likely cardiogenic.          Assessment and Plan:  Atrial and ventricular arrhythmia - during PSG; asymptomatic   - SVT, VT, AF   - placed on amio; cards following   - denies cp; echo relatively nl but tds  Acute (and suspect chronic) hypoxic respiratory failure    - placed on BiPAP   - continue with sleep and prn  DARIO   - suspect severe; significant desaturation   - continue empiric BiPAP with sleep   - likely d/c home on auto-pap unit until f/u appt at sleep center  Obesity  HTN  The patient remains critically ill.  Critical care time = 31 minutes in directly providing and coordinating critical care and extensive data review.  No time overlap and excludes procedures.

## 2017-06-24 NOTE — DISCHARGE INSTRUCTIONS
Discharge Instructions    Discharged to home by car with friend. Discharged via wheelchair, hospital escort: Yes.  Special equipment needed: Oxygen    Be sure to schedule a follow-up appointment with your primary care doctor or any specialists as instructed.     Discharge Plan:        I understand that a diet low in cholesterol, fat, and sodium is recommended for good health. Unless I have been given specific instructions below for another diet, I accept this instruction as my diet prescription.   Other diet: Cardiac      Special Instructions: None    · Is patient discharged on Warfarin / Coumadin?   No     · Is patient Post Blood Transfusion?  No    Depression / Suicide Risk    As you are discharged from this [facility] facility, it is important to learn how to keep safe from harming yourself.    Recognize the warning signs:  · Abrupt changes in personality, positive or negative- including increase in energy   · Giving away possessions  · Change in eating patterns- significant weight changes-  positive or negative  · Change in sleeping patterns- unable to sleep or sleeping all the time   · Unwillingness or inability to communicate  · Depression  · Unusual sadness, discouragement and loneliness  · Talk of wanting to die  · Neglect of personal appearance   · Rebelliousness- reckless behavior  · Withdrawal from people/activities they love  · Confusion- inability to concentrate     If you or a loved one observes any of these behaviors or has concerns about self-harm, here's what you can do:  · Talk about it- your feelings and reasons for harming yourself  · Remove any means that you might use to hurt yourself (examples: pills, rope, extension cords, firearm)  · Get professional help from the community (Mental Health, Substance Abuse, psychological counseling)  · Do not be alone:Call your Safe Contact- someone whom you trust who will be there for you.  · Call your local CRISIS HOTLINE 265-2070 or 008-451-7030  · Call  your local Children's Mobile Crisis Response Team [place] (737) 014-0677 or www.[employer].Smish  · Call the toll free National Suicide Prevention Hotlines   · National Suicide Prevention Lifeline 546-605-IXKT (5227)  · National Hope Line Network 800-SUICIDE (885-3210)

## 2017-06-24 NOTE — PROGRESS NOTES
Pt transported to room via gurney with RN, RT, CNA. Pt A&O x4. No complaints of pain. 2 RN skin assessment completed. Pt informed of POC. No questions at this time

## 2017-06-24 NOTE — PROGRESS NOTES
Cardiology Progress Note               Author: Nafisa Barclayscottdior Date & Time created: 2017  12:33 PM     Interval History:    Denies cardiac symptoms    Monitor showed intermittent but brief runs of NSVT  No palpitations or dizziness    ECHO NL EF 2 weeks ago    BP somewhat high  On amlodipine and losartan at home  Prob never been on betablocker    Review of Systems   Constitutional: Negative for fever and chills.   Respiratory: Negative for shortness of breath.         Snores with apnea during sleep   Cardiovascular: Positive for leg swelling. Negative for chest pain, palpitations and orthopnea.   Gastrointestinal: Negative for nausea and vomiting.   Neurological: Negative for dizziness.       Physical Exam    Hemodynamics:  Temp (24hrs), Av.6 °C (97.8 °F), Min:36.3 °C (97.4 °F), Max:36.7 °C (98.1 °F)  Temperature: 36.3 °C (97.4 °F)  Pulse  Av.9  Min: 65  Max: 92Heart Rate (Monitored): 78  NIBP: 146/93 mmHg     Respiratory:    Respiration: 19, Pulse Oximetry: 93 %        RUL Breath Sounds: Diminished, RML Breath Sounds: Diminished, RLL Breath Sounds: Diminished, JEET Breath Sounds: Diminished, LLL Breath Sounds: Diminished  Fluids:  Date 17 0700 - 17 0659   Shift 5659-0657 5264-0051 3715-2261 24 Hour Total   I  N  T  A  K  E   P.O. 240   240    Shift Total 240   240   O  U  T  P  U  T   Shift Total       Weight (kg) 216.9 216.9 216.9 216.9         GI/Nutrition:  Orders Placed This Encounter   Procedures   • Diet Order     Standing Status: Standing      Number of Occurrences: 1      Standing Expiration Date:      Order Specific Question:  Diet:     Answer:  Cardiac [6]     Lab Results:  Recent Labs      17   WBC  9.1   RBC  6.56*   HEMOGLOBIN  19.7*   HEMATOCRIT  61.7*   MCV  94.1   MCH  30.0   MCHC  31.9*   RDW  50.7*   PLATELETCT  238   MPV  10.1     Recent Labs      17   0055  17   0550   SODIUM  138  134*   POTASSIUM  3.9  4.7   CHLORIDE  100  102   CO2  28   26   GLUCOSE  108*  97   BUN  19  15   CREATININE  1.12  0.69   CALCIUM  9.1  8.3*     Recent Labs      06/23/17   0055   INR  0.94         Recent Labs      06/23/17   0055  06/24/17   0550   TROPONINI  0.04  0.02     Recent Labs      06/23/17   0055   TRIGLYCERIDE  122   HDL  38*   LDL  99         Medical Decision Making, by Problem:  Active Hospital Problems    Diagnosis   • *Ventricular tachycardia (CMS-HCC) [I47.2] non -sustained, NL EF by ECHO earlier this month, asymptomatic   • Morbid obesity (CMS-HCC) [E66.01]   • Sleep apnea [G47.30]   • Polycythemia [D75.1]   • HTN (hypertension) [I10]   Edema likely some cor pulmonale with perhaps some side effect of amlodipine    Plan:  D/c amlodipine  Start carvedilol 12.5 BID  Resume losartan  Add spironolactone  May transfer to [facility]  To lose weight and Rx sleep apnea    Core Measures

## 2017-06-24 NOTE — PROGRESS NOTES
"Pulmonary Critical Care Progress Note        Date of Service: 6/24/2017     Chief Complaint: SVT and VT    History of Present Illness: 41 y.o. male admitted for h/o gout, obesity, htn; transferred from [facility] for SVT and VT noted during study; patient asymptomatic, tired, denied CP; \"just like any other night.\"  Found to have A.flutter; placed on amio gtt; cardiology following.  Ongoing desats currently while sleeping on cpap; changed to bipap with improvement    ROS:    Respiratory: negative,   Cardiac: negative,   GI: negative.  All other systems negative.    Interval Events:  24 hour interval history reviewed   BiPAP 4 hours overnight, did not tolerate well.   Frequent intermittent runs of NSVT for 5-7 beats overnight  SR this morning,   Tolerating diet, adequate UOP  Lasix 40 BID    PFSH:  No change.    Physical Exam:  General:  Up in chair, no distress, currently on room air  HEENT:  PERRL, neck thick and supple  CVS:  Distant, regular  Respiratory:  Clear anteriorly, no wheezing  Abdomen:  Morbidly adipose, soft, NT  Extremities:  2+ pitting edema, distal pulses palpable  Skin:  Warm, dry, perfused  Neuro/Psych:  Oriented x4, NFE    Respiratory:     Pulse Oximetry: 92 %  Chest Tube Drains:        ImagingAvailable data reviewed         Invalid input(s): [id number]    HemoDynamics:  Pulse: 83, Heart Rate (Monitored): 90  NIBP: 140/84 mmHg       Imaging: Available data reviewed  Recent Labs      06/23/17   0055  06/24/17   0550   TROPONINI  0.04  0.02       Neuro:  GCS Total [place] Coma Score: 15     Imaging: Available data reviewed    Fluids:  Intake/Output       06/22/17 0700 - 06/23/17 0659 (Not Admitted) 06/23/17 0700 - 06/24/17 0659 06/24/17 0700 - 06/25/17 0659      [id number] 0823-7963 Total 3393-8853 3996-5559 Total 0200-5237 8232-7672 Total       Intake    P.O.  --  -- --  600  -- 600  --  -- --    P.O. -- -- -- 600 -- 600 -- -- --    I.V.  --  -- --  50  -- 50  --  -- --    Magnesium " Sulfate Volume -- -- -- 50 -- 50 -- -- --    Total Intake -- -- -- 650 -- 650 -- -- --       Output    Urine  --  -- --    --   --  -- --    Void (ml) -- -- --  --  -- -- --    Stool  --  -- --  --  -- --  --  -- --    Number of Times Stooled -- -- -- 1 x -- 1 x -- -- --    Total Output -- -- --  --  -- -- --       Net I/O     -- -- -- -1625 -- -1625 -- -- --          Recent Labs      17   0550   SODIUM  138  134*   POTASSIUM  3.9  4.7   CHLORIDE  100  102   CO2  28  26   BUN  19  15   CREATININE  1.12  0.69   MAGNESIUM  1.9   --    CALCIUM  9.1  8.3*       GI/Nutrition:  Imaging: Available data reviewed  taking PO  Liver Function  Recent Labs      17   0550   ALTSGPT  28   --    ASTSGOT  22   --    ALKPHOSPHAT  89   --    TBILIRUBIN  0.7   --    GLUCOSE  108*  97       Heme:  Recent Labs      17   RBC  6.56*   HEMOGLOBIN  19.7*   HEMATOCRIT  61.7*   PLATELETCT  238   PROTHROMBTM  12.9   INR  0.94       Infectious Disease:  Temp  Av.5 °C (97.7 °F)  Min: 36.3 °C (97.3 °F)  Max: 36.7 °C (98.1 °F)  Micro: reviewed  Recent Labs      17   WBC  9.1   NEUTSPOLYS  66.30   LYMPHOCYTES  23.30   MONOCYTES  7.20   EOSINOPHILS  2.20   BASOPHILS  0.70   ASTSGOT  22   ALTSGPT  28   ALKPHOSPHAT  89   TBILIRUBIN  0.7     Current Facility-Administered Medications   Medication Dose Frequency Provider Last Rate Last Dose   • allopurinol (ZYLOPRIM) tablet 100 mg  100 mg DAILY Nnamdi Shoemaker M.D.   100 mg at 17 1000   • senna-docusate (PERICOLACE or SENOKOT S) 8.6-50 MG per tablet 2 Tab  2 Tab BID Nnamdi Shoemaker M.D.   2 Tab at 17 1676    And   • polyethylene glycol/lytes (MIRALAX) PACKET 1 Packet  1 Packet QDAY PRN Nnamdi Shoemaker M.D.        And   • magnesium hydroxide (MILK OF MAGNESIA) suspension 30 mL  30 mL QDAY PRN Nnamdi Shoemaker M.D.        And   • bisacodyl (DULCOLAX) suppository 10 mg  10 mg QDAY PRN Nnamdi NEW  JENSEN Shoemaker       • Respiratory Care per Protocol   Continuous RT Nnamdi Shoemaker M.D.       • enoxaparin (LOVENOX) inj 40 mg  40 mg DAILY Nnamdi Shoemaker M.D.   40 mg at 06/24/17 1000   • ondansetron (ZOFRAN) syringe/vial injection 4 mg  4 mg Q4HRS PRN Nnamdi Shoemaker M.D.       • ondansetron (ZOFRAN ODT) dispertab 4 mg  4 mg Q4HRS PRN Nnamdi Shoemaker M.D.       • promethazine (PHENERGAN) tablet 12.5-25 mg  12.5-25 mg Q4HRS PRN Nnamdi Shoemaker M.D.       • promethazine (PHENERGAN) suppository 12.5-25 mg  12.5-25 mg Q4HRS PRN Nnadmi Shoemaker M.D.       • prochlorperazine (COMPAZINE) injection 5-10 mg  5-10 mg Q4HRS PRN Nnamdi Shoemaker M.D.       • potassium chloride SA (Kdur) tablet 20 mEq  20 mEq BID Nnamdi Shoemaker M.D.   20 mEq at 06/24/17 1009   • oxycodone immediate-release (ROXICODONE) tablet 5 mg  5 mg Q4HRS PRN Nnamdi Shoemaker M.D.       • furosemide (LASIX) injection 40 mg  40 mg BID DIURETIC Roger Neely M.D.   40 mg at 06/24/17 0600   • magnesium oxide (MAG-OX) tablet 400 mg  400 mg DAILY Roger Neely M.D.   400 mg at 06/24/17 1000   • MD ALERT...Adult ICU Electrolyte Replacement per Pharmacy Protocol   PRN William Avila M.D.         Last reviewed on 6/23/2017 10:12 AM by Tiesha Head R.N.    Quality  Measures:  Labs reviewed, Medications reviewed and Radiology images reviewed                      Assessment and Plan:  Atrial and ventricular arrhythmia - during PSG; asymptomatic              - SVT, VT, AF              - placed on amio; cards following              - denies cp; echo relatively nl but tds  Acute (and suspect chronic) hypoxic respiratory failure                      - placed on BiPAP              - continue with sleep and prn  DARIO              - suspect severe; significant desaturation              - continue empiric BiPAP with sleep              - d/c home on auto-pap unit until f/u appt at sleep center   - risk of SCD explained   Obesity  HTN  D/C today if  cleared by cardiology  Scrip written for:   - Auto BiPAP EPAP min 10, PS 5, Max IPAP 25   - 4 L oxygen bleed-in  Needs close f/u with sleep center - 2 weeks  Scrips sent to [facility] per   Discussed patient condition and risk of morbidity and/or mortality with RN, RT, Pharmacy and Charge nurse / hot rounds.         Adriana SANDY (Scribe), am scribing for, and in the presence of, William Avila M.D.    Electronically signed by: Adriana Rodgers (Scribe), 6/24/2017    IWilliam M.D. personally performed the services described in this documentation, as scribed by Adriana Rodgers in my presence, and it is both accurate and complete.

## 2017-06-24 NOTE — PROGRESS NOTES
"[facility] Hospitalist Progress Note    Date of Service: 2017    Chief Complaint  41 y.o. male admitted 2017 sent from sleep center last night, as patient had periods of apnea with noted significant arrhythmias. There were documented episodes of sinus tachycardia and multiple episodes of PVCs, bigeminy, trigeminy and then episode of nonsustained ventricular tachycardia.     Interval Problem Update  Not tolerating APAP.  \"I'm leaving one way or another today.\"  He denies any CP, palpitations overnight.  States he has been attempting weight loss at home.  Cardiology looking into EP studies.  He is currently sitting up in chair.  Speech clear.  No distress.    Consultants/Specialty  Pulmonary  Cardiology    Disposition  Home.        Review of Systems   Constitutional: Negative for fever and chills.   Eyes: Negative for discharge.   Respiratory: Negative for cough, shortness of breath and stridor.    Cardiovascular: Positive for leg swelling (\"chronic\"). Negative for chest pain and palpitations.   Gastrointestinal: Negative for nausea, vomiting, abdominal pain and diarrhea.   Genitourinary: Negative for dysuria and hematuria.   Musculoskeletal: Negative for back pain and joint pain.   Skin: Negative for rash.   Neurological: Negative for dizziness, sensory change, speech change and headaches.   Psychiatric/Behavioral: Negative for depression. The patient is not nervous/anxious.       Physical Exam  Laboratory/Imaging   Hemodynamics  Temp (24hrs), Av.6 °C (97.8 °F), Min:36.3 °C (97.3 °F), Max:36.7 °C (98.1 °F)   Temperature: 36.7 °C (98.1 °F)  Pulse  Av.7  Min: 65  Max: 92 Heart Rate (Monitored): 74  NIBP: 155/98 mmHg      Respiratory      Respiration: 19, Pulse Oximetry: 91 %        RUL Breath Sounds: Diminished, RML Breath Sounds: Diminished, RLL Breath Sounds: Diminished, JEET Breath Sounds: Diminished, LLL Breath Sounds: Diminished    Fluids    Intake/Output Summary (Last 24 hours) at 17 " 0857  Last data filed at 06/24/17 0500   Gross per 24 hour   Intake    650 ml   Output   2275 ml   Net  -1625 ml       Nutrition  Orders Placed This Encounter   Procedures   • Diet Order     Standing Status: Standing      Number of Occurrences: 1      Standing Expiration Date:      Order Specific Question:  Diet:     Answer:  Cardiac [6]     Physical Exam   Constitutional: No distress.   obese   HENT:   Head: Normocephalic and atraumatic.   Nose: Nose normal.   Mouth/Throat: Oropharynx is clear and moist.   Eyes: Conjunctivae and EOM are normal. Right eye exhibits no discharge. Left eye exhibits no discharge. No scleral icterus.   Neck: Normal range of motion. No tracheal deviation present.   Cardiovascular: Normal rate, regular rhythm, normal heart sounds and intact distal pulses.    No murmur heard.  Pulmonary/Chest: Effort normal and breath sounds normal. No respiratory distress. He has no wheezes.   Abdominal: Soft. Bowel sounds are normal. He exhibits no distension. There is no tenderness.   Musculoskeletal: He exhibits no edema.   Neurological: He is alert. No cranial nerve deficit.   Skin: Skin is warm. He is not diaphoretic.   Psychiatric: He has a normal mood and affect. His behavior is normal. Thought content normal.   Vitals reviewed.      Recent Labs      06/23/17   0055   WBC  9.1   RBC  6.56*   HEMOGLOBIN  19.7*   HEMATOCRIT  61.7*   MCV  94.1   MCH  30.0   MCHC  31.9*   RDW  50.7*   PLATELETCT  238   MPV  10.1     Recent Labs      06/23/17   0055  06/24/17   0550   SODIUM  138  134*   POTASSIUM  3.9  4.7   CHLORIDE  100  102   CO2  28  26   GLUCOSE  108*  97   BUN  19  15   CREATININE  1.12  0.69   CALCIUM  9.1  8.3*     Recent Labs      06/23/17   0055   INR  0.94         Recent Labs      06/23/17   0055   TRIGLYCERIDE  122   HDL  38*   LDL  99          Assessment/Plan     * Ventricular tachycardia (CMS-HCC)  Assessment & Plan  Due to DARIO most likely. Pulmonary consulting.  Cardiology  consulting  Correct electrolytes  Monitor vitals/telemetry  Reserved EF on Echo 6/9/17 but difficult study  Mild elevated TSH, check FT4  Lasix.     HTN (hypertension) (present on admission)  Assessment & Plan  Monitor vitals.    Sleep apnea  Assessment & Plan  APAP  Pulmonary consulting.  Monitor SpO2, ABG, vitals, labs.    Polycythemia  Assessment & Plan  Needs phlebotomy  Monitor CBC  Due to DARIO.    Morbid obesity (CMS-HCC)  Assessment & Plan  Patient attempting weight loss.      Radiology images reviewed, Labs reviewed and Medications reviewed

## 2017-06-24 NOTE — FACE TO FACE
Face to Face Note  -  Durable Medical Equipment    Tom Christian D.O. - NPI: [id number]  I certify that this patient is under my care and that they have had a durable medical equipment(DME)face to face encounter by myself that meets the physician DME face-to-face encounter requirements with this patient on:    Date of encounter:   Patient:                    MRN:                       YOB: 2019  Lionel Zacarias  [id number]  1975     The encounter with the patient was in whole, or in part, for the following medical condition, which is the primary reason for durable medical equipment:  Other - severe obstructive sleep apnea    I certify that, based on my findings, the following durable medical equipment is medically necessary:  Other DME Equipment - Auto PAP (Auto CPAP).    HOME O2 Saturation Measurements:(Values must be present for Home Oxygen orders)         ,     ,         My Clinical findings support the need for the above equipment due to:  Hypoxia    Supporting Symptoms: active apnea and arrhythmias     ------------------------------------------------------------------------------------------------------------------    Face to Face Supporting Documentation - Home Health    The encounter with this patient was in whole or in part the primary reason for home health admission.    Date of encounter:   Patient:                    MRN:                       YOB: 2019  Lionel Zacarias  [id number]  1975     Home health to see patient for:  Comment: none    Skilled need for:  Exacerbation of Chronic Disease State Sleep Apnea    Skilled nursing interventions to include:  Comment: none needed    Homebound evidenced status by:  Need the aid of supportive devices such as crutches, canes, wheelchairs or walkers. Leaving home must require a considerable and taxing effort. There must exist a normal inability to leave the home.    Community Physician to provide follow  up care: Austen Saunders M.D.     Optional Interventions    Wound information & treatment:    Home Infusion Therapy orders:    Line/Drain/Airway:    I certify the face to face encounter for this home care referral meets the CMS requirements and the encounter/clinical assessment with the patient was, in whole, or in part, for the medical condition(s) listed above, which is the primary reason for home health care. Based on my clinical findings: the service(s) are medically necessary, support the need for home health care, and the homebound criteria are met.  I certify that this patient has had a face to face encounter by myself.  Tom Christian D.O. - NPI: [id number]    *Debility, frailty and advanced age in the absence of an acute deterioration or exacerbation of a condition do not qualify a patient for home health.

## 2017-06-24 NOTE — CARE PLAN
Problem: Psychosocial Needs:  Goal: Level of anxiety will decrease  Outcome: PROGRESSING AS EXPECTED

## 2017-06-24 NOTE — ASSESSMENT & PLAN NOTE
Due to DARIO most likely. Pulmonary consulting.  Cardiology consulting  Correct electrolytes  Monitor vitals/telemetry  Reserved EF on Echo 6/9/17 but difficult study  Mild elevated TSH, check FT4  Lasix.

## 2017-06-24 NOTE — PROGRESS NOTES
PMA and hospitalist notified that therapeutic phlebotomy draw was sub-therapeutic. Lab only able to draw 80ml of blood. Will monitor closely.

## 2017-06-25 NOTE — PROGRESS NOTES
Reviewed d/c instruction with pt thoroughly, including medication changes and follow up appointments.  All questions answered.  PIV d/c'd without complication.

## 2017-06-25 NOTE — DISCHARGE SUMMARY
DATE OF ADMISSION:  06/23/2017    DATE OF DISCHARGE:  06/24/2017    CONSULTING PHYSICIAN:  William Avila MD    OUTPATIENT SLEEP STUDY CENTER:  Primary is [facility].    CARDIOLOGIST:  Roger Neely MD    PRIMARY CARE PHYSICIAN:  Austen Saunders MD    UROLOGIST:  Bernabe Don MD    CHIEF COMPLAINT:  Sent from sleep study/center for arrhythmia.    DISCHARGE DIAGNOSES:  1.  Morbid obesity.  2.  Status post ventricular tachycardia secondary to hypoxia/severe sleep   apnea.  3.  Severe sleep apnea.  4.  History of hypertension.  5.  Polycythemia.    IMAGING AND PROCEDURES:  Chest x-ray on June 23 showed mild pulmonary edema,   likely cardiogenic.    COURSE OF HOSPITALIZATION:  This is a 41-year-old morbidly obese gentleman.    He was sent into a polysomnogram sleep center and during his course of   evaluation, he had noted severe sleep apnea.  He was also having significant   ventricular arrhythmias with bigeminy and concerns of some trigeminy   nonsustained V-tach.  With this, patient was emergently transferred to [facility]   for further evaluation, initiated on IV amiodarone and seen by cardiology.    The patient had had prior cardiac stress testing, which was unremarkable, just   few months prior.  The patient was seen by pulmonary and myself as well as   cardiology.  Patient had no recurrence of any significant arrhythmia.  He had   a few PVCs during telemetry monitoring.  Patient was to be set up with   automated positive airway pressure, APAP machine.  He was initiated on this   overnight and tolerated it moderately.  I had a long discussion to make sure   that he is following up with the sleep center and trying to comply and trying   to get his CPAP/APAP machine working as best as he could.  I had a long   discussion with him as well.  In regards to life style modifications, the   importance of ongoing weight loss and the adverse side effects from untreated   obesity as well as sleep apnea.  Patient was  alert and oriented.  He denied   any chest pain during course of hospitalization.  He was sitting up in chair   quite readily.  He was able to ambulate on his own.  On date of discharge,   cardiology was recommending ongoing weight loss as well and treatment of sleep   apnea.  I made changes in medications.  Dr. William Avila had set the patient   up for home oxygen as well as APAP orders.  I did place orders as well as the   .  We had followed up on date of discharge.  Patient was eager to   be discharged home.  We called  several times this last I had   heard that APAP was being arranged.  However, I was later called by nurse   stating that the patient had left without receiving his APAP, states that the    call back and it was not going to be able to be set up until   Monday.  When asked if oxygen was arranged either, this was not performed   either.  This is only alerted to me after the fact that the patient had left.    My original discharge orders said not to discharge patient until APAP had   arrived.  Patient had discussed with me though that he was leaving _____   another today.  Patient otherwise was stable.  He was aware of severity of   untreated sleep apnea.  He is to follow up with sleep apnea center, pulmonary.    LABORATORY DATA:  During course of hospitalization, CBC showed a polycythemia   with hemoglobin of 19.7 on June 23rd, hematocrit is 61.7.  He was given 500 mL   phlebotomy recommend ongoing treatment of sleep apnea, obesity,   considerations of outpatient phlebotomy.  White count and platelet count were   unremarkable.  His comprehensive metabolic panel was unremarkable.  Magnesium   1.9, triglyceride levels 122, cholesterol 161, HDL 38, LDL 99.  Troponins were   unremarkable.  TSH was elevated at 5.09.  Of note, patient had prior elevated   parathyroid hormone on May 20th _____ further outpatient evaluation.    DISCHARGE CONDITION:  Stable.    DISCHARGE  DIET:  Recommend ongoing caloric reduction with healthy balanced   diet.    DISCHARGE INSTRUCTIONS:  Patient is to follow up with his primary care within   the next 2 weeks.  Recommend following up for polysomnogram testing center.    DISCHARGE MEDICATIONS:  1.  Allopurinol 300 mg daily.  2.  Losartan 100 mg daily.  3.  Coreg 6.25 mg twice daily with meals.  4.  Aldactone 25 mg daily.  5.  Lasix 40 mg twice a day.    Patient is to monitor his blood pressures.  Hold that his blood pressures are   too low, his Aldactone, Coreg and Cozaar.  Recommend laboratory data testing   on his kidney function with the initiation of Aldactone and Cozaar together.    Recommend ongoing followup with labs for his polycythemia.    Please note that 45 minutes was spent on patient's charting on date of   discharge.       ____________________________________     DO NEIL ROJAS / ROBERTO    DD:  06/25/2017 08:04:00  DT:  06/25/2017 11:45:11    D#:  [id number]  Job#:  [id number]    cc: FELI CHAPMAN MD, MIKIE CARVAJAL MD, TERENCE FERRER MD, SAI HENSLEY MD

## 2017-06-25 NOTE — PROGRESS NOTES
notified RN that BIPAP could not be completed until Monday.  Pt not willing to stay to further investigate home options.

## 2017-06-25 NOTE — PROGRESS NOTES
Spoke with Adrien in Social Work three times prior to d/c.  Pt will need to contact [facility] first thing Monday morning to arrange BIPAP set up at home.  Pt verbalizes understanding.

## 2017-06-26 LAB
BLD FROM PATIENT VOL: 80 ML
BP SYS/DIAS--P PHLEBOTOMY: NORMAL MM[HG]
HCT VFR BLD AUTO: NORMAL % (ref 42–52)
THERAPEUTIC PHLEB 1539: NORMAL

## 2017-06-26 NOTE — DISCHARGE PLANNING
SW spoke with pt who gave verbal consent to send a referral to [facility] for his home BIPAP. Per [facility] Radha, a BIPAP needs insurance auth and is typically only set up outpt after a sleep study. Per Radha, Monday will be the soonest that [facility] could attempt to get the BIPAP set up. RN updated. Pt to follow up with hard script and a possible sleep study.

## 2017-07-10 ENCOUNTER — SLEEP CENTER VISIT (OUTPATIENT)
Dept: SLEEP MEDICINE | Facility: MEDICAL CENTER | Age: 42
End: 2017-07-10
Payer: COMMERCIAL

## 2017-07-10 ENCOUNTER — NON-PROVIDER VISIT (OUTPATIENT)
Dept: PULMONOLOGY | Facility: HOSPICE | Age: 42
End: 2017-07-10
Payer: COMMERCIAL

## 2017-07-10 VITALS
RESPIRATION RATE: 16 BRPM | OXYGEN SATURATION: 87 % | HEART RATE: 83 BPM | HEIGHT: 75 IN | DIASTOLIC BLOOD PRESSURE: 86 MMHG | BODY MASS INDEX: 39.17 KG/M2 | WEIGHT: 315 LBS | SYSTOLIC BLOOD PRESSURE: 136 MMHG

## 2017-07-10 VITALS — HEIGHT: 74 IN | WEIGHT: 315 LBS | BODY MASS INDEX: 40.43 KG/M2

## 2017-07-10 DIAGNOSIS — R09.02 HYPOXIA: ICD-10-CM

## 2017-07-10 DIAGNOSIS — G47.33 OBSTRUCTIVE SLEEP APNEA: ICD-10-CM

## 2017-07-10 PROCEDURE — 94060 EVALUATION OF WHEEZING: CPT | Performed by: INTERNAL MEDICINE

## 2017-07-10 PROCEDURE — 94726 PLETHYSMOGRAPHY LUNG VOLUMES: CPT | Performed by: INTERNAL MEDICINE

## 2017-07-10 PROCEDURE — 99214 OFFICE O/P EST MOD 30 MIN: CPT | Performed by: INTERNAL MEDICINE

## 2017-07-10 PROCEDURE — 94729 DIFFUSING CAPACITY: CPT | Performed by: INTERNAL MEDICINE

## 2017-07-10 ASSESSMENT — PULMONARY FUNCTION TESTS
FEV1_PREDICTED: 4.63
FEV1/FVC_PERCENT_PREDICTED: 104
FVC_PREDICTED: 5.9
FVC_PERCENT_PREDICTED: 77
FEV1/FVC: 82
FEV1_PERCENT_PREDICTED: 80
FEV1/FVC_PERCENT_CHANGE: 0
FEV1_PERCENT_CHANGE: 2
FEV1/FVC_PERCENT_PREDICTED: 78
FVC: 4.56
FEV1_PERCENT_CHANGE: 0
FVC_PERCENT_PREDICTED: 79
FVC: 4.69
FEV1/FVC_PERCENT_PREDICTED: 101
FEV1: 3.74
FEV1: 3.74
FEV1_PERCENT_PREDICTED: 80
FEV1/FVC: 79.74

## 2017-07-10 NOTE — PROCEDURES
Technician: CHETAN Jacome    Technician Comment:  Good patient effort & cooperation.  The results of this test meet the ATS/ERS standards for acceptability & reproducibility.  Test was performed on the [employer] Body Plethysmograph-Elite DX system.  Predicted values were [facility]-3 for spirometry, [facility] for DLCO, ITS for Lung Volumes.  The DLCO was uncorrected for Hgb.  A bronchodilator of Ventolin HFA -2puffs via spacer administered.    Interpretation: Lung function testing revealed mild reduction of FEV1 at 80% predicted, 3.74 L. This test was completed July 10, 2017. No bronchodilator response was evident. Lung volumes demonstrate mild hyperinflation with normal total lung capacity. Oxygen transfer was normal. Good effort noted in flow volume loop confirms the mild obstructive pattern

## 2017-07-10 NOTE — MR AVS SNAPSHOT
"        Lionel Flower Rell   7/10/2017 1:00 PM   Sleep Center Visit   MRN: [id number]    Department:  [facility]   Dept Phone:  [phone]    Description:  Male : 1975   Provider:  Simba Foster M.D.           Reason for Visit     Follow-Up SS results      Allergies as of 7/10/2017     Allergen Noted Reactions    Morphine 2017   Unspecified    Nightmares terrors     Pcn [Penicillins] 2017   Hives    RXN - YEARS AGO         You were diagnosed with     Obstructive sleep apnea   [932145]       Hypoxia   [006136]         Vital Signs     Blood Pressure Pulse Respirations Height Weight Body Mass Index    136/86 mmHg 83 16 1.905 m (6' 3\") 211.376 kg (466 lb) 58.25 kg/m2    Oxygen Saturation Smoking Status                87% Never Smoker           Basic Information     Date Of Birth Sex Race Ethnicity Preferred Language    1975 Male White Non- English      Your appointments     Jul 10, 2017  4:00 PM   Pulmonary Function Test with PFT-RM3   [facility] (--)    [address]  [address]  [place] [address] [phone]            2017 10:30 AM   New Patient with Mike Lorenz RD   [facility])    [address]  [address]  [place] [address] [phone]           It is the patient's responsibility to check with your Insurance for benefit coverage for visit / visits.  24 hours notice is required for all appointment changes or cancellation.  Please arrive 20 min. before your appointment time  Please bring the following with you: 1)Picture Id 2) Insurance card 3) Completed Forms if New Patient  If scheduled for DIABETES VISIT please also brin) Medications 2) Meter 3) Blood glucose logs 4) Any recent labs if you have them  If scheduled for NUTRITION VISIT please also brin) 2-3 days of detailed food intake logs 2) Blood glucose monitor and blood glucose logs (if you have them)            Sep 14, 2017  9:00 " PM   Sleep Study with SLEEP TECH   [facility] Sleep Medicine (--)    [address] [facility] [address] [place] [address]   [phone]            Sep 18, 2017 10:40 AM   Follow UP with KRISTINA Toledo.   [facility] (--)    [address] [facility] [address]  [facility] [address]  [place] [address]   [phone]            Oct 06, 2017  1:00 PM   Established Patient Pul with Simba Foster M.D.   [facility] (--)    [address]  [address]  [place] [address] [phone]              Problem List              ICD-10-CM Priority Class Noted - Resolved    HTN (hypertension) I10   12/2/2010 - Present    GOUT    12/2/2010 - Present    Other ventral hernia without mention of obstruction or gangrene K43.9   2/8/2017 - Present    Hypoxia R09.02   2/9/2017 - Present    Sleep apnea G47.30   6/23/2017 - Present    Polycythemia D75.1   6/23/2017 - Present    Morbid obesity (CMS-HCC) E66.01   6/24/2017 - Present      Health Maintenance        Date Due Completion Dates    IMM DTaP/Tdap/Td Vaccine (1 - Tdap) 7/17/1992 ---    IMM INFLUENZA (1) 9/1/2017 ---            Current Immunizations     No immunizations on file.      Below and/or attached are the medications your provider expects you to take. Review all of your home medications and newly ordered medications with your provider and/or pharmacist. Follow medication instructions as directed by your provider and/or pharmacist. Please keep your medication list with you and share with your provider. Update the information when medications are discontinued, doses are changed, or new medications (including over-the-counter products) are added; and carry medication information at all times in the event of emergency situations     Allergies:  MORPHINE - Unspecified     PCN - Hives               Medications  Valid as of: July 10, 2017 -  1:44 PM    Generic Name Brand Name Tablet Size Instructions for use    Allopurinol (Tab) ZYLOPRIM 300 MG  Take 300 mg by mouth every day.        Carvedilol (Tab) COREG 6.25 MG Take 1 Tab by mouth 2 times a day, with meals.        Furosemide (Tab) LASIX 40 MG Take 40 mg by mouth 2 Times a Day.        Losartan Potassium (Tab) COZAAR 100 MG Take 100 mg by mouth every day.        Spironolactone (Tab) ALDACTONE 25 MG Take 1 Tab by mouth every day.        .                 Medicines prescribed today were sent to:     [facility] [address] [place] [address], [place] - [address] [facility]    [address] [facility] [place] [address]    Phone: [phone] Fax: [phone]    Open 24 Hours?: No      Medication refill instructions:       If your prescription bottle indicates you have medication refills left, it is not necessary to call your provider’s office. Please contact your pharmacy and they will refill your medication.    If your prescription bottle indicates you do not have any refills left, you may request refills at any time through one of the following ways: The online [employer] system (except Urgent Care), by calling your provider’s office, or by asking your pharmacy to contact your provider’s office with a refill request. Medication refills are processed only during regular business hours and may not be available until the next business day. Your provider may request additional information or to have a follow-up visit with you prior to refilling your medication.   *Please Note: Medication refills are assigned a new Rx number when refilled electronically. Your pharmacy may indicate that no refills were authorized even though a new prescription for the same medication is available at the pharmacy. Please request the medicine by name with the pharmacy before contacting your provider for a refill.        Your To Do List     Future Labs/Procedures Complete By Expires    AMB PULMONARY FUNCTION TEST/LAB  As directed 7/10/2018    Comments:    Please do pre and post, diffusion, lung volumes    ARTERIAL BLOOD GAS  As directed 7/10/2018     POLYSOMNOGRAPHY TITRATION  As directed 7/10/2018    Comments:    Start off with O2 and BPAP (14/10), then titrate up from there.      Instructions    1.  We have scheduled a BiPAP titration with oxygen  2. We have ordered a blood gas  3. We have ordered pulmonary function testing          MyChart Access Code: Activation code not generated  Current [employer] Status: Active          Quit Tobacco Information     Do you want to quit using tobacco?    Quitting tobacco decreases risks of cancer, heart and lung disease, increases life expectancy, improves sense of taste and smell, and increases spending money, among other benefits.    If you are thinking about quitting, we can help.  • [facility] Quit Tobacco Program: 144-029-5249  o Program occurs weekly for four weeks and includes pharmacist consultation on products to support quitting smoking or chewing tobacco. A provider referral is needed for pharmacist consultation.  • Tobacco Users Help Hotline: 9-074-QUIT-NOW (786-3424) or https://[place].quitlogix.org/  o Free, confidential telephone and online coaching for [place] residents. Sessions are designed on a schedule that is convenient for you. Eligible clients receive free nicotine replacement therapy.  • Nationally: www.smokefree.gov  o Information and professional assistance to support both immediate and long-term needs as you become, and remain, a non-smoker. Smokefree.gov allows you to choose the help that best fits your needs.

## 2017-07-10 NOTE — MR AVS SNAPSHOT
"        Lionel Flower Rell   7/10/2017 4:00 PM   Non-Provider Visit   MRN: [id number]    Department:  [facility]   Dept Phone:  [phone]    Description:  Male : 1975   Provider:  BHAVIK           Reason for Visit     Shortness of Breath           Allergies as of 7/10/2017     Allergen Noted Reactions    Morphine 2017   Unspecified    Nightmares terrors     Pcn [Penicillins] 2017   Hives    RXN - YEARS AGO         You were diagnosed with     Hypoxia   [793636]         Vital Signs     Height Weight Body Mass Index Smoking Status          1.88 m (6' 2\") 210.923 kg (465 lb) 59.68 kg/m2 Never Smoker         Basic Information     Date Of Birth Sex Race Ethnicity Preferred Language    1975 Male White Non- English      Your appointments     2017 10:30 AM   New Patient with Mike Lorenz RD   [facility])    [address]  [address]  [place] [address] [phone]           It is the patient's responsibility to check with your Insurance for benefit coverage for visit / visits.  24 hours notice is required for all appointment changes or cancellation.  Please arrive 20 min. before your appointment time  Please bring the following with you: 1)Picture Id 2) Insurance card 3) Completed Forms if New Patient  If scheduled for DIABETES VISIT please also brin) Medications 2) Meter 3) Blood glucose logs 4) Any recent labs if you have them  If scheduled for NUTRITION VISIT please also brin) 2-3 days of detailed food intake logs 2) Blood glucose monitor and blood glucose logs (if you have them)            Sep 14, 2017  9:00 PM   Sleep Study with SLEEP TECH   [facility] Sleep [facility] (--)    [address]  [place] [address] [phone]            Sep 18, 2017 10:40 AM   Follow UP with ISA Toledo   [facility] (--)    [address]  [place] [address] "   [phone]            Oct 06, 2017  1:00 PM   Established Patient Pul with Simba Foster M.D.   [facility] Pulmonary Medicine (--)    [address]  [address]  [place] [address] [phone]              Problem List              ICD-10-CM Priority Class Noted - Resolved    HTN (hypertension) I10   12/2/2010 - Present    GOUT    12/2/2010 - Present    Other ventral hernia without mention of obstruction or gangrene K43.9   2/8/2017 - Present    Hypoxia R09.02   2/9/2017 - Present    Sleep apnea G47.30   6/23/2017 - Present    Polycythemia D75.1   6/23/2017 - Present    Morbid obesity (CMS-HCC) E66.01   6/24/2017 - Present      Health Maintenance        Date Due Completion Dates    IMM DTaP/Tdap/Td Vaccine (1 - Tdap) 7/17/1992 ---    IMM INFLUENZA (1) 9/1/2017 ---            Current Immunizations     No immunizations on file.      Below and/or attached are the medications your provider expects you to take. Review all of your home medications and newly ordered medications with your provider and/or pharmacist. Follow medication instructions as directed by your provider and/or pharmacist. Please keep your medication list with you and share with your provider. Update the information when medications are discontinued, doses are changed, or new medications (including over-the-counter products) are added; and carry medication information at all times in the event of emergency situations     Allergies:  MORPHINE - Unspecified     PCN - Hives               Medications  Valid as of: July 10, 2017 -  4:27 PM    Generic Name Brand Name Tablet Size Instructions for use    Allopurinol (Tab) ZYLOPRIM 300 MG Take 300 mg by mouth every day.        Carvedilol (Tab) COREG 6.25 MG Take 1 Tab by mouth 2 times a day, with meals.        Furosemide (Tab) LASIX 40 MG Take 40 mg by mouth 2 Times a Day.        Losartan Potassium (Tab) COZAAR 100 MG Take 100 mg by mouth every day.        Spironolactone (Tab) ALDACTONE 25 MG Take 1  Tab by mouth every day.        .                 Medicines prescribed today were sent to:     [facility]  [place], [place] - [address] [facility]    [address] [facility] [place] [address]    Phone: [phone] Fax: [phone]    Open 24 Hours?: No      Medication refill instructions:       If your prescription bottle indicates you have medication refills left, it is not necessary to call your provider’s office. Please contact your pharmacy and they will refill your medication.    If your prescription bottle indicates you do not have any refills left, you may request refills at any time through one of the following ways: The online [employer] system (except Urgent Care), by calling your provider’s office, or by asking your pharmacy to contact your provider’s office with a refill request. Medication refills are processed only during regular business hours and may not be available until the next business day. Your provider may request additional information or to have a follow-up visit with you prior to refilling your medication.   *Please Note: Medication refills are assigned a new Rx number when refilled electronically. Your pharmacy may indicate that no refills were authorized even though a new prescription for the same medication is available at the pharmacy. Please request the medicine by name with the pharmacy before contacting your provider for a refill.           [employer] Access Code: Activation code not generated  Current [employer] Status: Active          Quit Tobacco Information     Do you want to quit using tobacco?    Quitting tobacco decreases risks of cancer, heart and lung disease, increases life expectancy, improves sense of taste and smell, and increases spending money, among other benefits.    If you are thinking about quitting, we can help.  • [facility] Quit Tobacco Program: 660.585.7563  o Program occurs weekly for four weeks and includes pharmacist consultation on products to support  quitting smoking or chewing tobacco. A provider referral is needed for pharmacist consultation.  • Tobacco Users Help Hotline: 5-354-QUIT-NOW (029-6623) or https://[place].quitlogix.org/  o Free, confidential telephone and online coaching for [place] residents. Sessions are designed on a schedule that is convenient for you. Eligible clients receive free nicotine replacement therapy.  • Nationally: www.smokefree.gov  o Information and professional assistance to support both immediate and long-term needs as you become, and remain, a non-smoker. Smokefree.gov allows you to choose the help that best fits your needs.

## 2017-07-10 NOTE — PATIENT INSTRUCTIONS
1.  We have scheduled a BiPAP titration with oxygen  2. We have ordered a blood gas  3. We have ordered pulmonary function testing

## 2017-07-10 NOTE — PROGRESS NOTES
Lionel Zacarias is a 41 y.o. male here for sleep apnea.    History of Present Illness:    The patient is a 41-year-old male who has morbid obesity and sleep apnea. He was brought in for sleep study and he developed severe hypoxemia and went into ventricular tachycardia. He was sent to the emergency room. Prior to discharge she was set up with an auto BiPAP machine with oxygen. The patient states that he is only using the BiPAP and not the oxygen. He is on a minimum EPAP of 10 cm water and it appears that the average EPAP is around 11 cm water and the average IPAP is 16 cm of water. The patient says that he is still having trouble sleeping. He is only sleeping about 3 hours a night with the machine. During the time that he sleeping with the machine he feels like it is better and he has noted some improvements during the daytime. Again he is not adequate oxygen to his machine even though it was ordered. We did order an echo which was essentially unremarkable and a chest x-ray which was also unremarkable pulmonary function tests were ordered but they have not yet been completed and the blood gas was not completed. The patient is scheduled to have knee surgery and needs a pulmonary preoperative clearance.    Constitutional:  Negative for fever, chills, sweats, and fatigue.  Eyes:  Negative for eye pain and visual changes.  HENT:  Negative for tinnitus and hoarse voice.  Cardiovascular:  Negative for chest pain, leg swelling, syncope and orthopnea.  Respiratory:  See HPI for pertinent negatives  Sleep:  Negative for somnolence, loud snoring, sleep disturbance due to breathing, insomnia.  Gastrointestinal:  Negative for dysphagia, nausea and abdominal pain.  Heme/lymph:  Denies easy bruising, blood clots.  Musculoskeletal:  Negative for arthralgias, sore muscles and back pain.  Skin:  Negative for rash and color change.  Neurological:  Negative for headaches, lightheadedness and weakness.  Psychiatric:  Denies  "depression.    Current Outpatient Prescriptions   Medication Sig Dispense Refill   • carvedilol (COREG) 6.25 MG Tab Take 1 Tab by mouth 2 times a day, with meals. 60 Tab 4   • spironolactone (ALDACTONE) 25 MG Tab Take 1 Tab by mouth every day. 30 Tab 1   • allopurinol (ZYLOPRIM) 300 MG Tab Take 300 mg by mouth every day.     • losartan (COZAAR) 100 MG Tab Take 100 mg by mouth every day.     • furosemide (LASIX) 40 MG Tab Take 40 mg by mouth 2 Times a Day.       No current facility-administered medications for this visit.       Social History   Substance Use Topics   • Smoking status: Never Smoker    • Smokeless tobacco: Current User     Types: Chew   • Alcohol Use: 3.0 oz/week     6 Cans of beer per week      Comment: 2 per week       Past Medical History   Diagnosis Date   • GOUT 12/2/2010   • Snoring    • Arthritis      joints   • HTN (hypertension) 12/2/2010 2017 pt states fairly well controlled   • Hypertension    • At risk for sleep apnea        Past Surgical History   Procedure Laterality Date   • Other orthopedic surgery       r hip   • Ventral hernia repair N/A 2/8/2017     Procedure: VENTRAL HERNIA REPAIR W/MESH;  Surgeon: Daniel De Oliveira M.D.;  Location: SURGERY [facility];  Service:    • Omentectomy N/A 2/8/2017     Procedure: OMENTECTOMY;  Surgeon: Daniel De Oliveira M.D.;  Location: SURGERY [facility];  Service:    • Hip replacement, total         Allergies:  Morphine and Pcn    Family History   Problem Relation Age of Onset   • Heart Disease Mother 55     MI   • Lung Disease Maternal Grandmother      COPD   • Heart Disease Maternal Grandfather      MI   • Sleep Apnea Brother        Physical Examination    Filed Vitals:    07/10/17 1257   Height: 1.905 m (6' 3\")   Weight: 211.376 kg (466 lb)   Weight % change since last entry.: 0 %   BP: 136/86   Pulse: 83   BMI (Calculated): 58.25   Resp: 16       Physical Exam:  Constitutional:  Well developed and well nourished.  Head:  Normocephalic and " atraumatic.  Nose:  Nose normal.  Mouth/Throat:  Oropharynx is clear and moist, no lesions.    Neck:  Normal range of motion.  Supple.  No JVD.  Cardiovascular:  Normal rate, regular rhythm, normal heart sounds. No edema  Pulmonary/Chest: No wheezing, rales or rhonchi.  Respiratory effort non labored  Musculoskeletal.  No muscular atrophy.  Lymphadenopathy:  No cervical or supraclavicular adenopathy  Neurological:  Alert and oriented.  Cranial nerves intact.  No focal deficits  Skin:  No rashes or ulcers.  Psyciatric:  Normal mood and affect.    Assessment and Plan:  1. Obstructive sleep apnea  Given the patient's morbid obesity I do recommend that we bring him back for a BiPAP titration and then we can set his machine accordingly. His current data card and indicates an apnea hypopnea index of 1.0 however the patient states that he's having trouble sleeping with the machine. He'll most likely need supplemental oxygen along with the BiPAP. We'll see him back after the titration.  - POLYSOMNOGRAPHY TITRATION; Future    2. Hypoxia  His hypoxemia is most likely related to obesity. I have ordered pulmonary function testing as well as a blood gas. I would like to review these prior to giving him clearance for his knee surgery.  - AMB PULMONARY FUNCTION TEST/LAB; Future  - ARTERIAL BLOOD GAS; Future      Followup Return in about 6 weeks (around 8/21/2017) for follow up visit with LASHON, follow up with the sleep physician.

## 2017-07-12 ENCOUNTER — TELEPHONE (OUTPATIENT)
Dept: PULMONOLOGY | Facility: HOSPICE | Age: 42
End: 2017-07-12

## 2017-07-13 NOTE — TELEPHONE ENCOUNTER
"----- Message from Yusra Rogers sent at 7/10/2017  4:32 PM PDT -----  Regarding: fort per fort, multiple visits scheduled  Tere Steele,      Just spoke with you about this patient. Last time Cristian saw him, for pulmonary, his notes state \"back w/[facility] per [facility] after x-ray, pft, and sleep study results\", then he was seen at [facility] today by Cristian and was scheduled for PFT today at our office,  for 9/14/17 & appt flower/Koko @ [facility] on 9/18/17... But then Hannah also left the appt w/Cristian in October for the same results. So just check with Cristian if he's ok with seeing Koko for all those results or if he wants the patient to keep the October appt w/him.    - thank you  "

## 2017-07-13 NOTE — TELEPHONE ENCOUNTER
Dr. Foster can this patient see EDY Ram for results of testing or do you want them to come in and see you in Oct?

## 2017-07-20 NOTE — TELEPHONE ENCOUNTER
Yusra,   Per Dr. Foster pt is Ok to see Koko.            Simba Foster M.D.   Ivette Peters, Med Ass't (You)   7 days ago    Ok to see Koko (Routing comment)

## 2017-07-24 ENCOUNTER — NON-PROVIDER VISIT (OUTPATIENT)
Dept: HEALTH INFORMATION MANAGEMENT | Facility: MEDICAL CENTER | Age: 42
End: 2017-07-24
Payer: COMMERCIAL

## 2017-07-24 VITALS — HEIGHT: 75 IN | WEIGHT: 315 LBS | BODY MASS INDEX: 39.17 KG/M2

## 2017-07-24 DIAGNOSIS — E66.01 MORBID OBESITY DUE TO EXCESS CALORIES (HCC): ICD-10-CM

## 2017-07-24 PROCEDURE — 97802 MEDICAL NUTRITION INDIV IN: CPT | Performed by: DIETITIAN, REGISTERED

## 2017-07-24 NOTE — MR AVS SNAPSHOT
Lionel Boswelldridge   2017 10:30 AM   Appointment   MRN: [id number]    Department:  [facility]   Dept Phone:  [phone]    Description:  Male : 1975   Provider:  Mike Lorenz RD           Allergies as of 2017     Allergen Noted Reactions    Morphine 2017   Unspecified    Nightmares terrors     Pcn [Penicillins] 2017   Hives    RXN - YEARS AGO         Vital Signs     Smoking Status                   Never Smoker            Basic Information     Date Of Birth Sex Race Ethnicity Preferred Language    1975 Male White Non- English      Your appointments     Sep 11, 2017  3:00 PM   Follow Up Visit with Mike Lorenz RD   [facility])    [address]  [address]  [place] [id number] [phone]           It is the patient's responsibility to check with your Insurance for benefit coverage for visit / visits.  24 hours notice is required for all appointment changes or cancellation.  Please arrive 20 min. before your appointment time  Please bring the following with you: 1)Picture Id 2) Insurance card 3) Completed Forms if New Patient  If scheduled for DIABETES VISIT please also brin) Medications 2) Meter 3) Blood glucose logs 4) Any recent labs if you have them  If scheduled for NUTRITION VISIT please also brin) 2-3 days of detailed food intake logs 2) Blood glucose monitor and blood glucose logs (if you have them)            Sep 14, 2017  9:00 PM   Sleep Study with SLEEP TECH   [facility] (--)    [address]  [address]  [place] [id number] [phone]            Sep 18, 2017 10:40 AM   Follow UP with ISA [place]   [facility] (--)    [address]  [address]  [place] [address]   [phone]            Oct 06, 2017  1:00 PM   Established Patient Pul with Simba Foster M.D.   [facility] (--)    [address]  [address]  [facility] [address]   [phone]              Problem List              ICD-10-CM Priority Class Noted - Resolved    HTN (hypertension) I10   12/2/2010 - Present    GOUT    12/2/2010 - Present    Other ventral hernia without mention of obstruction or gangrene K43.9   2/8/2017 - Present    Hypoxia R09.02   2/9/2017 - Present    Sleep apnea G47.30   6/23/2017 - Present    Polycythemia D75.1   6/23/2017 - Present    Morbid obesity (CMS-HCC) E66.01   6/24/2017 - Present      Health Maintenance        Date Due Completion Dates    IMM DTaP/Tdap/Td Vaccine (1 - Tdap) 7/17/1992 ---    IMM INFLUENZA (1) 9/1/2017 ---            Current Immunizations     No immunizations on file.      Below and/or attached are the medications your provider expects you to take. Review all of your home medications and newly ordered medications with your provider and/or pharmacist. Follow medication instructions as directed by your provider and/or pharmacist. Please keep your medication list with you and share with your provider. Update the information when medications are discontinued, doses are changed, or new medications (including over-the-counter products) are added; and carry medication information at all times in the event of emergency situations     Allergies:  MORPHINE - Unspecified     PCN - Hives               Medications  Valid as of: July 24, 2017 - 11:21 AM    Generic Name Brand Name Tablet Size Instructions for use    Allopurinol (Tab) ZYLOPRIM 300 MG Take 300 mg by mouth every day.        Carvedilol (Tab) COREG 6.25 MG Take 1 Tab by mouth 2 times a day, with meals.        Furosemide (Tab) LASIX 40 MG Take 40 mg by mouth 2 Times a Day.        Losartan Potassium (Tab) COZAAR 100 MG Take 100 mg by mouth every day.        Spironolactone (Tab) ALDACTONE 25 MG Take 1 Tab by mouth every day.        .                 Medicines prescribed today were sent to:     [facility] [address] [place] [address], [place] [facility] [address] [facility]      [address] [facility] [place] [address]    Phone: [phone] Fax: [phone]    Open 24 Hours?: No      Medication refill instructions:       If your prescription bottle indicates you have medication refills left, it is not necessary to call your provider’s office. Please contact your pharmacy and they will refill your medication.    If your prescription bottle indicates you do not have any refills left, you may request refills at any time through one of the following ways: The online CONWEAVER system (except Urgent Care), by calling your provider’s office, or by asking your pharmacy to contact your provider’s office with a refill request. Medication refills are processed only during regular business hours and may not be available until the next business day. Your provider may request additional information or to have a follow-up visit with you prior to refilling your medication.   *Please Note: Medication refills are assigned a new Rx number when refilled electronically. Your pharmacy may indicate that no refills were authorized even though a new prescription for the same medication is available at the pharmacy. Please request the medicine by name with the pharmacy before contacting your provider for a refill.           CONWEAVER Access Code: Activation code not generated  Current CONWEAVER Status: Active          Quit Tobacco Information     Do you want to quit using tobacco?    Quitting tobacco decreases risks of cancer, heart and lung disease, increases life expectancy, improves sense of taste and smell, and increases spending money, among other benefits.    If you are thinking about quitting, we can help.  • [facility] Quit Tobacco Program: [phone]  o Program occurs weekly for four weeks and includes pharmacist consultation on products to support quitting smoking or chewing tobacco. A provider referral is needed for pharmacist consultation.  • Tobacco Users Help Hotline: 0-409-QUIT-NOW (486-1452) or  https://[place].quitlogix.org/  o Free, confidential telephone and online coaching for [place] residents. Sessions are designed on a schedule that is convenient for you. Eligible clients receive free nicotine replacement therapy.  • Nationally: www.smokefree.gov  o Information and professional assistance to support both immediate and long-term needs as you become, and remain, a non-smoker. Smokefree.gov allows you to choose the help that best fits your needs.

## 2017-07-24 NOTE — PROGRESS NOTES
"7/24/2017    Austen Saunders M.D.  42 y.o.   Time in/out: 1030 - 1126    Anthropometrics/Objective  Filed Vitals:    07/24/17 1133   Height: 1.905 m (6' 3\")   Weight: 213.826 kg (471 lb 6.4 oz)       Body mass index is 58.92 kg/(m^2).    Stated Goal Weight: 300#    Subjective:  -Wants to lose weight and be healthier  -Trying to follow a low/no-CHO diet at this time and finds it difficult to do  -Knows that he cannot eat sweets because he eats large amounts of them if he does  -Trying to read food labels and limit sodium and CHO  -Exercising on a recumbent bicycle 4-5 days/week for 10-30 minutes, depending on how his knees feel    Nutrition Diagnosis (PES Statement)    Morbid obesity related to excessive energy intake and inadequate energy expenditure as evidenced by BMI > 40.    Biochemical data, medical test and procedures  Lab Results   Component Value Date/Time    GLYCOHEMOGLOBIN 6.1* 05/20/2017 10:37 AM     Lab Results   Component Value Date/Time    CHOLESTEROL, 06/23/2017 12:55 AM    LDL 99 06/23/2017 12:55 AM    HDL 38* 06/23/2017 12:55 AM    TRIGLYCERIDES 122 06/23/2017 12:55 AM         Nutrition Intervention  Meal and Snack  Recommend a general/healthful diet    Comprehensive Nutrition education Instruction or training leading to in-depth nutrition related knowledge about:  Combine carb, protein and fat at each meal, Meal timing and spacing, Menu Planning, Metabolism of carb, protein, fat, Physical activity/exercise, Portion control, Label Reading and Handouts provided regarding topics discussed    Monitoring & Evaluation Plan  Behavioral-Environmental:  Behavior:  Consistent CHO intake throughout the day  Physical activity:  Increase as tolerated    Food / Nutrient Intake:  Food intake:  Use the plate method recommendations for portions/balance at meals    Physical Signs / Symptoms:  Weight change:  Weight loss to goal      Assessment Notes:  Lionel is doing the right thing by reading food " labels so I worked with him to refine his technique and to give him some goals to stay under for sodium, CHO, and trans fat.  He now realizes that he can eat CHO, and is not to exceed 60 grams at each meal; this should give him some freedom to expand what he is eating, since he loves corn/peas/potatoes and did not realize they are starchy vegetables.  I love that he has taken it upon himself to make some changes and they appear to be helping so far.  He will continue to exercise as tolerated because he knows it is helping him burn a few more kcal and is working as a medicine to keep him healthy.  I would like to see him again in 6 weeks to see how he is doing and see if he has over-indulged in any treats.

## 2017-08-16 ENCOUNTER — TELEPHONE (OUTPATIENT)
Dept: SLEEP MEDICINE | Facility: MEDICAL CENTER | Age: 42
End: 2017-08-16

## 2017-08-16 NOTE — TELEPHONE ENCOUNTER
Pt has yet to complete SS or ABG testing but did do PFT, he is wondering if you can review and give him surgical clearance for ZOË. Let me know, thanks!

## 2017-08-17 NOTE — TELEPHONE ENCOUNTER
Per Dr. Foster pt needs to complete all testing and have FV. Pt SS moved to 8/18/17, also advised him to do ABG.

## 2017-08-18 ENCOUNTER — SLEEP STUDY (OUTPATIENT)
Dept: SLEEP MEDICINE | Facility: MEDICAL CENTER | Age: 42
End: 2017-08-18
Attending: INTERNAL MEDICINE
Payer: COMMERCIAL

## 2017-08-18 DIAGNOSIS — G47.33 OBSTRUCTIVE SLEEP APNEA: ICD-10-CM

## 2017-08-18 PROCEDURE — 95811 POLYSOM 6/>YRS CPAP 4/> PARM: CPT | Performed by: INTERNAL MEDICINE

## 2017-08-21 ENCOUNTER — TELEPHONE (OUTPATIENT)
Dept: PULMONOLOGY | Facility: HOSPICE | Age: 42
End: 2017-08-21

## 2017-08-21 NOTE — PROCEDURES
Clinical Comments:  The patient underwent a BIPAP titration using the standard montage for measurement of parameters of sleep, respiratory events, movement abnormalities, heart rate and rhythm. A microphone was used to monitor snoring.      INTERPRETATION:  The total recording time was 420.7 minutes with a sleep period of 361.5 minutes and the total sleep time was 345.0 minutes with a sleep efficiency of 82.0%.  The sleep latency was 59.2 minutes, and REM latency was 52.5 minutes.  The patient experienced 27 arousals in total, for an arousal index of 4.7    RESPIRATORY: The patient had 1 apneas in total.  Of these, 0 were obstructive apneas, and 1 were central apneas.  This resulted in an apnea index (AI) of 0.2.  The patient had 20 hypopneas, for a hypopnea index of 3.5.  The overall AHI was 3.7, while the AHI during Stage R sleep was 4.9.  AHI while supine was 6.2.    OXIMETRY: Oxygen saturation monitoring showed a mean SpO2 of 90.9%, with a minimum oxygen saturation of 84.0%.  Oxygen saturations were less than or = 89% for 78.8 minutes of sleep time.    CARDIAC: The highest heart rate during the recording was 156.0 beats per minute.  The average heart rate during sleep was 69.9 bpm.    LIMB MOVEMENTS: There were a total of 56 PLMs during sleep, of which 8 were PLMs arousals.  This resulted in a PLMS index of 9.7.    BIPAP was tried from 11/7 to 19/13cm H2O.    42-year-old with morbid obesity and severe obstructive sleep apnea comes in for a BiPAP titration.    Technical summary: The patient underwent a CPAP titration.  This was a 16 channel montage study to include a 6 channel EEG, a 2 channel EOG, and chin EMG, left and right leg EMG, a snore channel, and a CFLOW pressure transducer.   Respiratory effort was assessed with the use of a thoracic and abdominal monitor and overnight oximetry was obtained. Audio and video recordings were reviewed. This was a fully attended study and sleep stage scoring was performed.  The test was technically adequate.    General sleep summary:  During the overnight study, there was a mildly prolonged sleep latency and mildly early REM latency.   The total sleep time was 345 minutes and sleep efficiency was 82%.  Sleep stage proportions showed 3.8% stage I, 65.8% stage II, 2.3% delta sleep and 28% REM sleep.  In regards to sleep quality there was a normal degree of sleep fragmentation as shown by the arousal index of 4.7 an hour. The arousals were due to spontaneous arousals and obstructive hypopneas.    BPAP Titration:  The BPAP pressure was initiated at 11/7 cm of water and the pressure was increased in an attempt to eliminate all sleep disordered breathing and snoring. The BPAP pressure was increased to 19/13 cm water and at this final pressure the patient was observed in the non-supine position and in the REM sleep stage. The apnea hypopnea index was 3.7 per hour and the low oxygen saturation 89%. Snoring was resolved. There were no significant periodic limb movements.  The patient demonstrated normal sinus rhythm and an average heart rate of 71 beats per minute.  There is occasional PVCs but no significant tachyarrhythmia. The patient utilized a medium Eson nasal mask with heated humidification. The BPAP was well-tolerated and there were minimal air leaks. No supplemental oxygen was required.    Impression:  1. Successful BiPAP titration to 19/13 cmH2O with the patient sleeping only in the nonsupine position  2. Severe obstructive sleep apnea  3. Elevated BMI  4. PVCs  5. Polycythemia  6. History of ventricular tachycardia    Recommendations:  Recommend BPAP at the above settings. It should be noted that the patient slept only in the nonsupine position at the final BiPAP settings therefore further adjustments to the BiPAP pressure may be required if the patient is to sleep supine at home. Recommended a data card that can measure leak, apnea hypopnea index and compliance for further  outpatient monitoring and management of BPAP therapy. In some cases alternative treatment options may prove effective in resolving sleep apnea and these options include upper airway surgery, the use of a dental orthotic or weight loss and positional therapy. Clinical correlation is required. In general patients with sleep apnea are advised to avoid alcohol and sedatives and to not operate a motor vehicle while drowsy and are at a greater risk for cardiovascular disease.

## 2017-08-21 NOTE — TELEPHONE ENCOUNTER
LVM for pt to call back regarding his lab for Arterial blood gas. Pt needs to schedule lab prior to fu with Josh 8/23

## 2017-08-24 ENCOUNTER — HOSPITAL ENCOUNTER (OUTPATIENT)
Dept: LAB | Facility: MEDICAL CENTER | Age: 42
End: 2017-08-24
Attending: INTERNAL MEDICINE
Payer: COMMERCIAL

## 2017-08-24 ENCOUNTER — OFFICE VISIT (OUTPATIENT)
Dept: PULMONOLOGY | Facility: HOSPICE | Age: 42
End: 2017-08-24
Payer: COMMERCIAL

## 2017-08-24 VITALS
BODY MASS INDEX: 39.17 KG/M2 | RESPIRATION RATE: 16 BRPM | OXYGEN SATURATION: 92 % | SYSTOLIC BLOOD PRESSURE: 130 MMHG | DIASTOLIC BLOOD PRESSURE: 90 MMHG | WEIGHT: 315 LBS | TEMPERATURE: 98.1 F | HEIGHT: 75 IN | HEART RATE: 79 BPM

## 2017-08-24 DIAGNOSIS — G47.33 OSA (OBSTRUCTIVE SLEEP APNEA): ICD-10-CM

## 2017-08-24 DIAGNOSIS — R09.02 HYPOXIA: ICD-10-CM

## 2017-08-24 LAB
BASE EXCESS BLDA CALC-SCNC: 2 MMOL/L (ref -4–3)
BODY TEMPERATURE: NORMAL CENTIGRADE
HCO3 BLDA-SCNC: 25 MMOL/L (ref 17–25)
PCO2 BLDA: 36.3 MMHG (ref 26–37)
PH BLDA: 7.46 [PH] (ref 7.4–7.5)
PO2 BLDA: 67.6 MMHG (ref 64–87)
SAO2 % BLDA: 93.3 % (ref 93–99)

## 2017-08-24 PROCEDURE — 82803 BLOOD GASES ANY COMBINATION: CPT

## 2017-08-24 PROCEDURE — 99213 OFFICE O/P EST LOW 20 MIN: CPT | Performed by: NURSE PRACTITIONER

## 2017-08-24 NOTE — PROGRESS NOTES
Chief Complaint   Patient presents with   • Apnea     Sleep Study Results         HPI:  This is a 42 y.o. male with a history of obstructive sleep apnea.  Polysomnogram indicates AHI 73.0 and minimum saturation 29%. The patient had no time with oxygenation of above 90%. He is currently using auto BiPAPMax IPAP 25, min EPAP 10, PS 5 with 4 L of oxygen bleed. Patient underwent titration study in which she was titrated on BiPAP 11/7-19/13 cm. On BiPAP 19/13 cm the patient had almost 31 minutes of REM sleep, AHI was reduced to 3.7, and basal saturation was 92.8% with minimum saturation 89%. The patient tolerated the pressure the night of the study. He is quite kind of his auto BiPAP currently and would like to keep the auto feature in use. The patient is more rested. He is now sleeping through the night. He is awaiting clearance for knee surgery. Pulmonary function tests from 7/10/17 indicate FEV1 3.74 L, 80% predicted, FEV1/FVC 82%, FEF 25-75% 89% predicted, and DLCO 149% predicted. A chest x-ray was obtained 6/23/17 indicating mild pulmonary edema likely cardiogenic.    The patient was in the sleep center for polysomnogram. The patient was found to have extremely low oxygen saturations and ventricular tachycardia. He was then sent to the emergency department for further evaluation. The patient was started on IV amiodarone and evaluated by cardiology. Previous cardiac stress testing has been unremarkable. During telemetry throughout his hospitalization he had a few PVCs however no sustained arrhythmia. The patient was started on airway pressurization therapy during hospitalization and had some difficulty acclimating. Compliance dated 7/25-8/23/17 indicates 97% usage for 5.5 hours. The patient's AHI has been reduced to 1.3. He is definitely benefiting from auto BiPAP therapy.    Past Medical History   Diagnosis Date   • GOUT 12/2/2010   • Snoring    • Arthritis      joints   • HTN (hypertension) 12/2/2010     2017 pt  "states fairly well controlled   • Hypertension    • At risk for sleep apnea    • DARIO (obstructive sleep apnea) 6/23/2017       Past Surgical History   Procedure Laterality Date   • Other orthopedic surgery       r hip   • Ventral hernia repair N/A 2/8/2017     Procedure: VENTRAL HERNIA REPAIR W/MESH;  Surgeon: Daniel De Oliveira M.D.;  Location: SURGERY [facility];  Service:    • Omentectomy N/A 2/8/2017     Procedure: OMENTECTOMY;  Surgeon: Daniel De Oliveira M.D.;  Location: SURGERY [facility];  Service:    • Hip replacement, total         Social History   Substance Use Topics   • Smoking status: Never Smoker    • Smokeless tobacco: Current User     Types: Chew   • Alcohol Use: 3.0 oz/week     6 Cans of beer per week      Comment: 2 per week       ROS:   Constitutional: Denies fevers, chills, sweats, fatigue, and weight loss.  Eyes: Glasses.  Ears/nose/mouth/throat: Denies injury.  Cardiovascular: Denies chest pain, tightness.  Respiratory: Denies shortness of breath, cough, sputum, wheezing, hemoptysis.  GI: Denies heartburn, difficulty swallowing, nausea, and vomiting.  Neurological: Denies frequent headaches, dizziness, weakness.  Sleep: See history of present illness.    Vitals:  Filed Vitals:    08/24/17 0914   Height: 1.905 m (6' 3\")   Weight: 204.572 kg (451 lb)   Weight % change since last entry.: 0 %   BP: 130/90   Pulse: 79   BMI (Calculated): 56.37   Resp: 16   Temp: 36.7 °C (98.1 °F)     Allergies:  Morphine and Pcn    Medications.  Current Outpatient Prescriptions   Medication Sig Dispense Refill   • carvedilol (COREG) 6.25 MG Tab Take 1 Tab by mouth 2 times a day, with meals. 60 Tab 4   • spironolactone (ALDACTONE) 25 MG Tab Take 1 Tab by mouth every day. 30 Tab 1   • allopurinol (ZYLOPRIM) 300 MG Tab Take 300 mg by mouth every day.     • losartan (COZAAR) 100 MG Tab Take 100 mg by mouth every day.     • furosemide (LASIX) 40 MG Tab Take 40 mg by mouth 2 Times a Day.       No current " facility-administered medications for this visit.       PHYSICAL EXAM:  Appearance: Well-developed, well-nourished, no acute distress.  Eyes. PERRL.  Hearing: Grossly intact.  Oropharynx: Tongue normal, posterior pharynx without erythema or exudate.  Respiratory effort: No intercostal retractions or use of accessory muscles.  Lung auscultation: No crackles, wheezing.  Heart auscultation: No murmur, gallop, or rub. Regular rate and rhythm.  Extremities: No cyanosis or edema.  Gait and Station: Normal  Orientation: Oriented to time, place, and person.    Assessment:  1. DARIO (obstructive sleep apnea)  OVERNIGHT OXIMETRY         Plan:  1. Change auto BiPAP to max IPAP 25, minimum EPAP 10, PS 5 with 4L of oxygen bleed in.  2. Overnight oximetry on auto BiPAP with 4 L of oxygen bleed in.    Return in about 2 months (around 10/24/2017) for With LASHON Melvin.      The patient is going to continue on his current auto BiPAP settings with 4 L of oxygen bleed in. She is now completely acclimated to therapy. He is more rested. He is sleeping through the night. His pulmonary function tests are within normal limits. I would say that the patient is adequate for her needed surgery. He is at somewhat higher risk for complications such as prolonged mechanical ventilation, acute respiratory failure, and pneumonia related to his obstructive sleep apnea but currently it is well treated.

## 2017-08-24 NOTE — PATIENT INSTRUCTIONS
1. Change auto BiPAP to max IPAP 25, minimum EPAP 10, PS 5 with 4L of oxygen bleed in.  2. Overnight oximetry on auto BiPAP with 4 L of oxygen bleed in.

## 2017-08-24 NOTE — MR AVS SNAPSHOT
"        Lionel Bsowelldridge   2017 9:40 AM   Office Visit   MRN: [id number]    Department:  [facility]   Dept Phone:  [phone]    Description:  Male : 1975   Provider:  ROSITA Blankenship           Reason for Visit     Apnea Sleep Study Results      Allergies as of 2017     Allergen Noted Reactions    Morphine 2017   Unspecified    Nightmares terrors     Pcn [Penicillins] 2017   Hives    RXN - YEARS AGO         Vital Signs     Blood Pressure Pulse Temperature Respirations Height Weight    130/90 mmHg 79 36.7 °C (98.1 °F) 16 1.905 m (6' 3\") 204.572 kg (451 lb)    Body Mass Index Oxygen Saturation Smoking Status             56.37 kg/m2 92% Never Smoker          Basic Information     Date Of Birth Sex Race Ethnicity Preferred Language    1975 Male White Non- English      Your appointments     Sep 11, 2017  3:00 PM   Follow Up Visit with Mike Lorenz RD   [facility])    [address]  [address]  [place] [address] [phone]           It is the patient's responsibility to check with your Insurance for benefit coverage for visit / visits.  24 hours notice is required for all appointment changes or cancellation.  Please arrive 20 min. before your appointment time  Please bring the following with you: 1)Picture Id 2) Insurance card 3) Completed Forms if New Patient  If scheduled for DIABETES VISIT please also brin) Medications 2) Meter 3) Blood glucose logs 4) Any recent labs if you have them  If scheduled for NUTRITION VISIT please also brin) 2-3 days of detailed food intake logs 2) Blood glucose monitor and blood glucose logs (if you have them)            Oct 06, 2017  1:00 PM   Established Patient Pul with Simba Foster M.D.   [facility] (--)    [address]  [address]  [place] [address] [phone]              Problem List              ICD-10-CM Priority Class Noted - Resolved   " HTN (hypertension) I10   12/2/2010 - Present    GOUT    12/2/2010 - Present    Other ventral hernia without mention of obstruction or gangrene K43.9   2/8/2017 - Present    Hypoxia R09.02   2/9/2017 - Present    Sleep apnea G47.30   6/23/2017 - Present    Polycythemia D75.1   6/23/2017 - Present    Morbid obesity (CMS-HCC) E66.01   6/24/2017 - Present      Health Maintenance        Date Due Completion Dates    IMM DTaP/Tdap/Td Vaccine (1 - Tdap) 7/17/1992 ---    IMM INFLUENZA (1) 9/1/2017 ---            Current Immunizations     No immunizations on file.      Below and/or attached are the medications your provider expects you to take. Review all of your home medications and newly ordered medications with your provider and/or pharmacist. Follow medication instructions as directed by your provider and/or pharmacist. Please keep your medication list with you and share with your provider. Update the information when medications are discontinued, doses are changed, or new medications (including over-the-counter products) are added; and carry medication information at all times in the event of emergency situations     Allergies:  MORPHINE - Unspecified     PCN - Hives               Medications  Valid as of: August 24, 2017 - 10:12 AM    Generic Name Brand Name Tablet Size Instructions for use    Allopurinol (Tab) ZYLOPRIM 300 MG Take 300 mg by mouth every day.        Carvedilol (Tab) COREG 6.25 MG Take 1 Tab by mouth 2 times a day, with meals.        Furosemide (Tab) LASIX 40 MG Take 40 mg by mouth 2 Times a Day.        Losartan Potassium (Tab) COZAAR 100 MG Take 100 mg by mouth every day.        Spironolactone (Tab) ALDACTONE 25 MG Take 1 Tab by mouth every day.        .                 Medicines prescribed today were sent to:     [facility] [place], [place] - [address]    [address] [address]    Phone: [phone] Fax: [phone]    Open 24 Hours?: No      Medication  refill instructions:       If your prescription bottle indicates you have medication refills left, it is not necessary to call your provider’s office. Please contact your pharmacy and they will refill your medication.    If your prescription bottle indicates you do not have any refills left, you may request refills at any time through one of the following ways: The online Imaging3 system (except Urgent Care), by calling your provider’s office, or by asking your pharmacy to contact your provider’s office with a refill request. Medication refills are processed only during regular business hours and may not be available until the next business day. Your provider may request additional information or to have a follow-up visit with you prior to refilling your medication.   *Please Note: Medication refills are assigned a new Rx number when refilled electronically. Your pharmacy may indicate that no refills were authorized even though a new prescription for the same medication is available at the pharmacy. Please request the medicine by name with the pharmacy before contacting your provider for a refill.           Imaging3 Access Code: Activation code not generated  Current Imaging3 Status: Active          Quit Tobacco Information     Do you want to quit using tobacco?    Quitting tobacco decreases risks of cancer, heart and lung disease, increases life expectancy, improves sense of taste and smell, and increases spending money, among other benefits.    If you are thinking about quitting, we can help.  • [facility] Quit Tobacco Program: 961.709.3306  o Program occurs weekly for four weeks and includes pharmacist consultation on products to support quitting smoking or chewing tobacco. A provider referral is needed for pharmacist consultation.  • Tobacco Users Help Hotline: 9-800-QUIT-NOW (560-7587) or https://[place].quitlogix.org/  o Free, confidential telephone and online coaching for [place] residents. Sessions are designed on a  schedule that is convenient for you. Eligible clients receive free nicotine replacement therapy.  • Nationally: www.smokefree.gov  o Information and professional assistance to support both immediate and long-term needs as you become, and remain, a non-smoker. Smokefree.gov allows you to choose the help that best fits your needs.

## 2017-08-24 NOTE — Clinical Note
ROSITA Blankenship  [facility] Pulmonary Medicine   [address],   [place] [address]  Phone: [phone] - Fax: [phone]           Encounter Date: 8/24/2017  Provider: ROSITA Blankenship  Location of Care: [facility] PULMONARY [facility]      Patient:   [place] Zacarias   MR Number: [id number]   YOB: 1975     PROGRESS NOTE:  Chief Complaint   Patient presents with   • Apnea     Sleep Study Results         HPI:  This is a 42 y.o. male with a history of obstructive sleep apnea.  Polysomnogram indicates AHI 73.0 and minimum saturation 29%. The patient had no time with oxygenation of above 90%. He is currently using auto BiPAPMax IPAP 25, min EPAP 10, PS 5 with 4 L of oxygen bleed. Patient underwent titration study in which she was titrated on BiPAP 11/7-19/13 cm. On BiPAP 19/13 cm the patient had almost 31 minutes of REM sleep, AHI was reduced to 3.7, and basal saturation was 92.8% with minimum saturation 89%. The patient tolerated the pressure the night of the study. He is quite kind of his auto BiPAP currently and would like to keep the auto feature in use. The patient is more rested. He is now sleeping through the night. He is awaiting clearance for knee surgery. Pulmonary function tests from 7/10/17 indicate FEV1 3.74 L, 80% predicted, FEV1/FVC 82%, FEF 25-75% 89% predicted, and DLCO 149% predicted. A chest x-ray was obtained 6/23/17 indicating mild pulmonary edema likely cardiogenic.    The patient was in the sleep center for polysomnogram. The patient was found to have extremely low oxygen saturations and ventricular tachycardia. He was then sent to the emergency department for further evaluation. The patient was started on IV amiodarone and evaluated by cardiology. Previous cardiac stress testing has been unremarkable. During telemetry throughout his hospitalization he had a few PVCs however no sustained arrhythmia. The patient was started on  "airway pressurization therapy during hospitalization and had some difficulty acclimating.    Past Medical History   Diagnosis Date   • GOUT 12/2/2010   • Snoring    • Arthritis      joints   • HTN (hypertension) 12/2/2010     2017 pt states fairly well controlled   • Hypertension    • At risk for sleep apnea    • DARIO (obstructive sleep apnea) 6/23/2017       Past Surgical History   Procedure Laterality Date   • Other orthopedic surgery       r hip   • Ventral hernia repair N/A 2/8/2017     Procedure: VENTRAL HERNIA REPAIR W/MESH;  Surgeon: Daniel De Oliveira M.D.;  Location: SURGERY [facility];  Service:    • Omentectomy N/A 2/8/2017     Procedure: OMENTECTOMY;  Surgeon: Daniel De Oliveira M.D.;  Location: SURGERY [facility];  Service:    • Hip replacement, total         Social History   Substance Use Topics   • Smoking status: Never Smoker    • Smokeless tobacco: Current User     Types: Chew   • Alcohol Use: 3.0 oz/week     6 Cans of beer per week      Comment: 2 per week       ROS:   Constitutional: Denies fevers, chills, sweats, fatigue, and weight loss.  Eyes: Glasses.  Ears/nose/mouth/throat: Denies injury.  Cardiovascular: Denies chest pain, tightness.  Respiratory: Denies shortness of breath, cough, sputum, wheezing, hemoptysis.  GI: Denies heartburn, difficulty swallowing, nausea, and vomiting.  Neurological: Denies frequent headaches, dizziness, weakness.  Sleep: See history of present illness.    Vitals:  Filed Vitals:    08/24/17 0914   Height: 1.905 m (6' 3\")   Weight: 204.572 kg (451 lb)   Weight % change since last entry.: 0 %   BP: 130/90   Pulse: 79   BMI (Calculated): 56.37   Resp: 16   Temp: 36.7 °C (98.1 °F)     Allergies:  Morphine and Pcn    Medications.  Current Outpatient Prescriptions   Medication Sig Dispense Refill   • carvedilol (COREG) 6.25 MG Tab Take 1 Tab by mouth 2 times a day, with meals. 60 Tab 4   • spironolactone (ALDACTONE) 25 MG Tab Take 1 Tab by mouth every day. 30 Tab 1   • " allopurinol (ZYLOPRIM) 300 MG Tab Take 300 mg by mouth every day.     • losartan (COZAAR) 100 MG Tab Take 100 mg by mouth every day.     • furosemide (LASIX) 40 MG Tab Take 40 mg by mouth 2 Times a Day.       No current facility-administered medications for this visit.       PHYSICAL EXAM:  Appearance: Well-developed, well-nourished, no acute distress.  Eyes. PERRL.  Hearing: Grossly intact.  Oropharynx: Tongue normal, posterior pharynx without erythema or exudate.  Respiratory effort: No intercostal retractions or use of accessory muscles.  Lung auscultation: No crackles, wheezing.  Heart auscultation: No murmur, gallop, or rub. Regular rate and rhythm.  Extremities: No cyanosis or edema.  Gait and Station: Normal  Orientation: Oriented to time, place, and person.    Assessment:  1. DARIO (obstructive sleep apnea)  OVERNIGHT OXIMETRY         Plan:  1. Change auto BiPAP to max IPAP 25, minimum EPAP 10, PS 5 with 4L of oxygen bleed in.  2. Overnight oximetry on auto BiPAP with 4 L of oxygen bleed in.    Return in about 2 months (around 10/24/2017) for With LASHON Melvin.          Electronically signed by Humaira Moreno ADonPTORI.  on 08/24/2017    Austen Saunders M.D.  [address]  [place] [address]  VIA Facsimile: [phone]     Trace Galdamez M.D.  [address]  [facility] [address]  VIA Facsimile: [phone]     Eric Gooden M.D.  [address]  [address]  [place] [address]  VIA Facsimile: [phone]

## 2017-08-24 NOTE — Clinical Note
ROSITA Blankenship  [facility] Pulmonary Medicine   [address],   [facility] [place] [address]  Phone: [phone] - Fax: [phone]           Encounter Date: 8/24/2017  Provider: ROSITA Blankenship  [facility] of Care: [facility] PULMONARY MEDICINE      Patient:   [place] Zacarias   MR Number: [id number]   YOB: 1975     PROGRESS NOTE:  Chief Complaint   Patient presents with   • Apnea     Sleep Study Results         HPI:  This is a 42 y.o. male with a history of obstructive sleep apnea.  Polysomnogram indicates AHI 73.0 and minimum saturation 29%. The patient had no time with oxygenation of above 90%. He is currently using auto BiPAPMax IPAP 25, min EPAP 10, PS 5 with 4 L of oxygen bleed. Patient underwent titration study in which she was titrated on BiPAP 11/7-19/13 cm. On BiPAP 19/13 cm the patient had almost 31 minutes of REM sleep, AHI was reduced to 3.7, and basal saturation was 92.8% with minimum saturation 89%. The patient tolerated the pressure the night of the study. He is quite kind of his auto BiPAP currently and would like to keep the auto feature in use. The patient is more rested. He is now sleeping through the night. He is awaiting clearance for knee surgery. Pulmonary function tests from 7/10/17 indicate FEV1 3.74 L, 80% predicted, FEV1/FVC 82%, FEF 25-75% 89% predicted, and DLCO 149% predicted. A chest x-ray was obtained 6/23/17 indicating mild pulmonary edema likely cardiogenic.    The patient was in the sleep center for polysomnogram. The patient was found to have extremely low oxygen saturations and ventricular tachycardia. He was then sent to the emergency department for further evaluation. The patient was started on IV amiodarone and evaluated by cardiology. Previous cardiac stress testing has been unremarkable. During telemetry throughout his hospitalization he had a few PVCs however no sustained arrhythmia. The patient was started on  "airway pressurization therapy during hospitalization and had some difficulty acclimating. Compliance dated 7/25-8/23/17 indicates 97% usage for 5.5 hours. The patient's AHI has been reduced to 1.3. He is definitely benefiting from auto BiPAP therapy.    Past Medical History   Diagnosis Date   • GOUT 12/2/2010   • Snoring    • Arthritis      joints   • HTN (hypertension) 12/2/2010 2017 pt states fairly well controlled   • Hypertension    • At risk for sleep apnea    • DARIO (obstructive sleep apnea) 6/23/2017       Past Surgical History   Procedure Laterality Date   • Other orthopedic surgery       r hip   • Ventral hernia repair N/A 2/8/2017     Procedure: VENTRAL HERNIA REPAIR W/MESH;  Surgeon: Daniel De Oliveira M.D.;  Location: SURGERY [facility];  Service:    • Omentectomy N/A 2/8/2017     Procedure: OMENTECTOMY;  Surgeon: Daniel De Oliveira M.D.;  Location: SURGERY [facility];  Service:    • Hip replacement, total         Social History   Substance Use Topics   • Smoking status: Never Smoker    • Smokeless tobacco: Current User     Types: Chew   • Alcohol Use: 3.0 oz/week     6 Cans of beer per week      Comment: 2 per week       ROS:   Constitutional: Denies fevers, chills, sweats, fatigue, and weight loss.  Eyes: Glasses.  Ears/nose/mouth/throat: Denies injury.  Cardiovascular: Denies chest pain, tightness.  Respiratory: Denies shortness of breath, cough, sputum, wheezing, hemoptysis.  GI: Denies heartburn, difficulty swallowing, nausea, and vomiting.  Neurological: Denies frequent headaches, dizziness, weakness.  Sleep: See history of present illness.    Vitals:  Filed Vitals:    08/24/17 0914   Height: 1.905 m (6' 3\")   Weight: 204.572 kg (451 lb)   Weight % change since last entry.: 0 %   BP: 130/90   Pulse: 79   BMI (Calculated): 56.37   Resp: 16   Temp: 36.7 °C (98.1 °F)     Allergies:  Morphine and Pcn    Medications.  Current Outpatient Prescriptions   Medication Sig Dispense Refill   • carvedilol " (COREG) 6.25 MG Tab Take 1 Tab by mouth 2 times a day, with meals. 60 Tab 4   • spironolactone (ALDACTONE) 25 MG Tab Take 1 Tab by mouth every day. 30 Tab 1   • allopurinol (ZYLOPRIM) 300 MG Tab Take 300 mg by mouth every day.     • losartan (COZAAR) 100 MG Tab Take 100 mg by mouth every day.     • furosemide (LASIX) 40 MG Tab Take 40 mg by mouth 2 Times a Day.       No current facility-administered medications for this visit.       PHYSICAL EXAM:  Appearance: Well-developed, well-nourished, no acute distress.  Eyes. PERRL.  Hearing: Grossly intact.  Oropharynx: Tongue normal, posterior pharynx without erythema or exudate.  Respiratory effort: No intercostal retractions or use of accessory muscles.  Lung auscultation: No crackles, wheezing.  Heart auscultation: No murmur, gallop, or rub. Regular rate and rhythm.  Extremities: No cyanosis or edema.  Gait and Station: Normal  Orientation: Oriented to time, place, and person.    Assessment:  1. DARIO (obstructive sleep apnea)  OVERNIGHT OXIMETRY         Plan:  1. Change auto BiPAP to max IPAP 25, minimum EPAP 10, PS 5 with 4L of oxygen bleed in.  2. Overnight oximetry on auto BiPAP with 4 L of oxygen bleed in.    Return in about 2 months (around 10/24/2017) for With LASHON Melvin.      The patient is going to continue on his current auto BiPAP settings with 4 L of oxygen bleed in. She is now completely acclimated to therapy. He is more rested. He is sleeping through the night. His pulmonary function tests are within normal limits. I would say that the patient is adequate for her needed surgery. He is at somewhat higher risk for complications such as prolonged mechanical ventilation, acute respiratory failure, and pneumonia related to his obstructive sleep apnea but currently it is well treated.      Electronically signed by Humaira Moreno, A.P.JOY  on 08/24/2017    Eric Gooden M.D.  [address]  [place] [address]  VIA Facsimile: [phone]

## 2017-09-11 ENCOUNTER — APPOINTMENT (OUTPATIENT)
Dept: HEALTH INFORMATION MANAGEMENT | Facility: MEDICAL CENTER | Age: 42
End: 2017-09-11
Payer: COMMERCIAL

## 2017-10-06 ENCOUNTER — APPOINTMENT (OUTPATIENT)
Dept: PULMONOLOGY | Facility: HOSPICE | Age: 42
End: 2017-10-06
Payer: COMMERCIAL

## 2017-12-18 ENCOUNTER — HOSPITAL ENCOUNTER (INPATIENT)
Facility: MEDICAL CENTER | Age: 42
LOS: 2 days | DRG: 853 | End: 2017-12-20
Attending: EMERGENCY MEDICINE | Admitting: SURGERY
Payer: COMMERCIAL

## 2017-12-18 ENCOUNTER — APPOINTMENT (OUTPATIENT)
Dept: RADIOLOGY | Facility: MEDICAL CENTER | Age: 42
DRG: 853 | End: 2017-12-18
Attending: EMERGENCY MEDICINE
Payer: COMMERCIAL

## 2017-12-18 DIAGNOSIS — L02.215 PERINEAL ABSCESS: ICD-10-CM

## 2017-12-18 DIAGNOSIS — D72.829 LEUKOCYTOSIS, UNSPECIFIED TYPE: ICD-10-CM

## 2017-12-18 DIAGNOSIS — L02.91 ABSCESS: ICD-10-CM

## 2017-12-18 DIAGNOSIS — A41.9 SEPSIS AFFECTING SKIN: ICD-10-CM

## 2017-12-18 LAB
ANION GAP SERPL CALC-SCNC: 11 MMOL/L (ref 0–11.9)
BASOPHILS # BLD AUTO: 0.4 % (ref 0–1.8)
BASOPHILS # BLD: 0.07 K/UL (ref 0–0.12)
BUN SERPL-MCNC: 13 MG/DL (ref 8–22)
CALCIUM SERPL-MCNC: 8.8 MG/DL (ref 8.5–10.5)
CHLORIDE SERPL-SCNC: 105 MMOL/L (ref 96–112)
CO2 SERPL-SCNC: 22 MMOL/L (ref 20–33)
CREAT SERPL-MCNC: 0.88 MG/DL (ref 0.5–1.4)
EKG IMPRESSION: NORMAL
EOSINOPHIL # BLD AUTO: 0.19 K/UL (ref 0–0.51)
EOSINOPHIL NFR BLD: 1.1 % (ref 0–6.9)
ERYTHROCYTE [DISTWIDTH] IN BLOOD BY AUTOMATED COUNT: 42.7 FL (ref 35.9–50)
GFR SERPL CREATININE-BSD FRML MDRD: >60 ML/MIN/1.73 M 2
GLUCOSE SERPL-MCNC: 108 MG/DL (ref 65–99)
HCT VFR BLD AUTO: 38.5 % (ref 42–52)
HGB BLD-MCNC: 12.9 G/DL (ref 14–18)
IMM GRANULOCYTES # BLD AUTO: 0.06 K/UL (ref 0–0.11)
IMM GRANULOCYTES NFR BLD AUTO: 0.4 % (ref 0–0.9)
LACTATE BLD-SCNC: 1.7 MMOL/L (ref 0.5–2)
LYMPHOCYTES # BLD AUTO: 1.52 K/UL (ref 1–4.8)
LYMPHOCYTES NFR BLD: 8.9 % (ref 22–41)
MCH RBC QN AUTO: 30.3 PG (ref 27–33)
MCHC RBC AUTO-ENTMCNC: 33.5 G/DL (ref 33.7–35.3)
MCV RBC AUTO: 90.4 FL (ref 81.4–97.8)
MONOCYTES # BLD AUTO: 1.33 K/UL (ref 0–0.85)
MONOCYTES NFR BLD AUTO: 7.8 % (ref 0–13.4)
NEUTROPHILS # BLD AUTO: 13.95 K/UL (ref 1.82–7.42)
NEUTROPHILS NFR BLD: 81.4 % (ref 44–72)
NRBC # BLD AUTO: 0 K/UL
NRBC BLD-RTO: 0 /100 WBC
PLATELET # BLD AUTO: 334 K/UL (ref 164–446)
PMV BLD AUTO: 10.8 FL (ref 9–12.9)
POTASSIUM SERPL-SCNC: 3.7 MMOL/L (ref 3.6–5.5)
RBC # BLD AUTO: 4.26 M/UL (ref 4.7–6.1)
SODIUM SERPL-SCNC: 138 MMOL/L (ref 135–145)
WBC # BLD AUTO: 17.1 K/UL (ref 4.8–10.8)

## 2017-12-18 PROCEDURE — 87040 BLOOD CULTURE FOR BACTERIA: CPT

## 2017-12-18 PROCEDURE — 700102 HCHG RX REV CODE 250 W/ 637 OVERRIDE(OP): Performed by: EMERGENCY MEDICINE

## 2017-12-18 PROCEDURE — 87205 SMEAR GRAM STAIN: CPT

## 2017-12-18 PROCEDURE — 700101 HCHG RX REV CODE 250

## 2017-12-18 PROCEDURE — 80048 BASIC METABOLIC PNL TOTAL CA: CPT

## 2017-12-18 PROCEDURE — 160039 HCHG SURGERY MINUTES - EA ADDL 1 MIN LEVEL 3: Performed by: SURGERY

## 2017-12-18 PROCEDURE — 700105 HCHG RX REV CODE 258: Performed by: EMERGENCY MEDICINE

## 2017-12-18 PROCEDURE — 160028 HCHG SURGERY MINUTES - 1ST 30 MINS LEVEL 3: Performed by: SURGERY

## 2017-12-18 PROCEDURE — 500423 HCHG DRESSING, ABD COMBINE: Performed by: SURGERY

## 2017-12-18 PROCEDURE — 72193 CT PELVIS W/DYE: CPT

## 2017-12-18 PROCEDURE — A9270 NON-COVERED ITEM OR SERVICE: HCPCS | Performed by: EMERGENCY MEDICINE

## 2017-12-18 PROCEDURE — 85025 COMPLETE CBC W/AUTO DIFF WBC: CPT

## 2017-12-18 PROCEDURE — 500440 HCHG DRESSING, STERILE ROLL (KERLIX): Performed by: SURGERY

## 2017-12-18 PROCEDURE — 93005 ELECTROCARDIOGRAM TRACING: CPT | Performed by: EMERGENCY MEDICINE

## 2017-12-18 PROCEDURE — 99291 CRITICAL CARE FIRST HOUR: CPT

## 2017-12-18 PROCEDURE — 160036 HCHG PACU - EA ADDL 30 MINS PHASE I: Performed by: SURGERY

## 2017-12-18 PROCEDURE — 0H99XZZ DRAINAGE OF PERINEUM SKIN, EXTERNAL APPROACH: ICD-10-PCS | Performed by: SURGERY

## 2017-12-18 PROCEDURE — 160035 HCHG PACU - 1ST 60 MINS PHASE I: Performed by: SURGERY

## 2017-12-18 PROCEDURE — 87075 CULTR BACTERIA EXCEPT BLOOD: CPT

## 2017-12-18 PROCEDURE — 96366 THER/PROPH/DIAG IV INF ADDON: CPT

## 2017-12-18 PROCEDURE — 700102 HCHG RX REV CODE 250 W/ 637 OVERRIDE(OP): Performed by: SURGERY

## 2017-12-18 PROCEDURE — 87070 CULTURE OTHR SPECIMN AEROBIC: CPT

## 2017-12-18 PROCEDURE — 96375 TX/PRO/DX INJ NEW DRUG ADDON: CPT

## 2017-12-18 PROCEDURE — 700111 HCHG RX REV CODE 636 W/ 250 OVERRIDE (IP): Performed by: EMERGENCY MEDICINE

## 2017-12-18 PROCEDURE — 96365 THER/PROPH/DIAG IV INF INIT: CPT

## 2017-12-18 PROCEDURE — A9270 NON-COVERED ITEM OR SERVICE: HCPCS | Performed by: SURGERY

## 2017-12-18 PROCEDURE — 160048 HCHG OR STATISTICAL LEVEL 1-5: Performed by: SURGERY

## 2017-12-18 PROCEDURE — 160009 HCHG ANES TIME/MIN: Performed by: SURGERY

## 2017-12-18 PROCEDURE — 160002 HCHG RECOVERY MINUTES (STAT): Performed by: SURGERY

## 2017-12-18 PROCEDURE — 700111 HCHG RX REV CODE 636 W/ 250 OVERRIDE (IP)

## 2017-12-18 PROCEDURE — 700101 HCHG RX REV CODE 250: Performed by: SURGERY

## 2017-12-18 PROCEDURE — 83605 ASSAY OF LACTIC ACID: CPT

## 2017-12-18 PROCEDURE — 770022 HCHG ROOM/CARE - ICU (200)

## 2017-12-18 PROCEDURE — 700117 HCHG RX CONTRAST REV CODE 255: Performed by: EMERGENCY MEDICINE

## 2017-12-18 RX ORDER — ACETAMINOPHEN 325 MG/1
650 TABLET ORAL EVERY 6 HOURS PRN
Status: DISCONTINUED | OUTPATIENT
Start: 2017-12-18 | End: 2017-12-20 | Stop reason: HOSPADM

## 2017-12-18 RX ORDER — HEPARIN SODIUM 5000 [USP'U]/ML
5000 INJECTION, SOLUTION INTRAVENOUS; SUBCUTANEOUS EVERY 8 HOURS
Status: DISCONTINUED | OUTPATIENT
Start: 2017-12-18 | End: 2017-12-20 | Stop reason: HOSPADM

## 2017-12-18 RX ORDER — FUROSEMIDE 40 MG/1
40 TABLET ORAL
Status: DISCONTINUED | OUTPATIENT
Start: 2017-12-18 | End: 2017-12-20 | Stop reason: HOSPADM

## 2017-12-18 RX ORDER — SULFAMETHOXAZOLE AND TRIMETHOPRIM 800; 160 MG/1; MG/1
1 TABLET ORAL 2 TIMES DAILY
Status: ON HOLD | COMMUNITY
Start: 2017-12-16 | End: 2017-12-20

## 2017-12-18 RX ORDER — DOXYCYCLINE HYCLATE 100 MG
100 TABLET ORAL 2 TIMES DAILY
Status: ON HOLD | COMMUNITY
Start: 2017-12-16 | End: 2017-12-20

## 2017-12-18 RX ORDER — POLYETHYLENE GLYCOL 3350 17 G/17G
1 POWDER, FOR SOLUTION ORAL
Status: DISCONTINUED | OUTPATIENT
Start: 2017-12-18 | End: 2017-12-20 | Stop reason: HOSPADM

## 2017-12-18 RX ORDER — CEFTRIAXONE 2 G/1
2 INJECTION, POWDER, FOR SOLUTION INTRAMUSCULAR; INTRAVENOUS ONCE
Status: COMPLETED | OUTPATIENT
Start: 2017-12-18 | End: 2017-12-18

## 2017-12-18 RX ORDER — HYDROMORPHONE HYDROCHLORIDE 2 MG/ML
1 INJECTION, SOLUTION INTRAMUSCULAR; INTRAVENOUS; SUBCUTANEOUS ONCE
Status: COMPLETED | OUTPATIENT
Start: 2017-12-18 | End: 2017-12-18

## 2017-12-18 RX ORDER — IBUPROFEN 200 MG
600-800 TABLET ORAL EVERY 8 HOURS PRN
COMMUNITY

## 2017-12-18 RX ORDER — CEFTRIAXONE 2 G/1
2 INJECTION, POWDER, FOR SOLUTION INTRAMUSCULAR; INTRAVENOUS ONCE
Status: DISCONTINUED | OUTPATIENT
Start: 2017-12-18 | End: 2017-12-18

## 2017-12-18 RX ORDER — OXYCODONE HYDROCHLORIDE 5 MG/1
5 TABLET ORAL
Status: DISCONTINUED | OUTPATIENT
Start: 2017-12-18 | End: 2017-12-20 | Stop reason: HOSPADM

## 2017-12-18 RX ORDER — CARVEDILOL 6.25 MG/1
6.25 TABLET ORAL 2 TIMES DAILY WITH MEALS
Status: DISCONTINUED | OUTPATIENT
Start: 2017-12-19 | End: 2017-12-19

## 2017-12-18 RX ORDER — PROMETHAZINE HYDROCHLORIDE 25 MG/1
12.5-25 TABLET ORAL EVERY 4 HOURS PRN
Status: DISCONTINUED | OUTPATIENT
Start: 2017-12-18 | End: 2017-12-20 | Stop reason: HOSPADM

## 2017-12-18 RX ORDER — ONDANSETRON 2 MG/ML
4 INJECTION INTRAMUSCULAR; INTRAVENOUS EVERY 4 HOURS PRN
Status: DISCONTINUED | OUTPATIENT
Start: 2017-12-18 | End: 2017-12-20 | Stop reason: HOSPADM

## 2017-12-18 RX ORDER — ACETAMINOPHEN 650 MG/1
650 SUPPOSITORY RECTAL ONCE
Status: COMPLETED | OUTPATIENT
Start: 2017-12-18 | End: 2017-12-18

## 2017-12-18 RX ORDER — ONDANSETRON 4 MG/1
4 TABLET, ORALLY DISINTEGRATING ORAL EVERY 4 HOURS PRN
Status: DISCONTINUED | OUTPATIENT
Start: 2017-12-18 | End: 2017-12-20 | Stop reason: HOSPADM

## 2017-12-18 RX ORDER — PROMETHAZINE HYDROCHLORIDE 25 MG/1
12.5-25 SUPPOSITORY RECTAL EVERY 4 HOURS PRN
Status: DISCONTINUED | OUTPATIENT
Start: 2017-12-18 | End: 2017-12-20 | Stop reason: HOSPADM

## 2017-12-18 RX ORDER — SODIUM CHLORIDE AND POTASSIUM CHLORIDE 150; 900 MG/100ML; MG/100ML
INJECTION, SOLUTION INTRAVENOUS CONTINUOUS
Status: DISCONTINUED | OUTPATIENT
Start: 2017-12-18 | End: 2017-12-20 | Stop reason: HOSPADM

## 2017-12-18 RX ORDER — AMOXICILLIN 250 MG
2 CAPSULE ORAL 2 TIMES DAILY
Status: DISCONTINUED | OUTPATIENT
Start: 2017-12-18 | End: 2017-12-20 | Stop reason: HOSPADM

## 2017-12-18 RX ORDER — MAGNESIUM HYDROXIDE 1200 MG/15ML
LIQUID ORAL
Status: DISCONTINUED | OUTPATIENT
Start: 2017-12-18 | End: 2017-12-18 | Stop reason: HOSPADM

## 2017-12-18 RX ORDER — OXYCODONE HYDROCHLORIDE 5 MG/1
2.5 TABLET ORAL
Status: DISCONTINUED | OUTPATIENT
Start: 2017-12-18 | End: 2017-12-20 | Stop reason: HOSPADM

## 2017-12-18 RX ORDER — SODIUM CHLORIDE 9 MG/ML
30 INJECTION, SOLUTION INTRAVENOUS ONCE
Status: COMPLETED | OUTPATIENT
Start: 2017-12-18 | End: 2017-12-18

## 2017-12-18 RX ORDER — BISACODYL 10 MG
10 SUPPOSITORY, RECTAL RECTAL
Status: DISCONTINUED | OUTPATIENT
Start: 2017-12-18 | End: 2017-12-20 | Stop reason: HOSPADM

## 2017-12-18 RX ORDER — ACETAMINOPHEN 650 MG/1
650 SUPPOSITORY RECTAL ONCE
Status: DISCONTINUED | OUTPATIENT
Start: 2017-12-18 | End: 2017-12-18

## 2017-12-18 RX ADMIN — FENTANYL CITRATE 50 MCG: 50 INJECTION, SOLUTION INTRAMUSCULAR; INTRAVENOUS at 22:48

## 2017-12-18 RX ADMIN — SODIUM CHLORIDE 5820 ML: 9 INJECTION, SOLUTION INTRAVENOUS at 20:45

## 2017-12-18 RX ADMIN — ACETAMINOPHEN 650 MG: 650 SUPPOSITORY RECTAL at 20:43

## 2017-12-18 RX ADMIN — VANCOMYCIN HYDROCHLORIDE 3000 MG: 100 INJECTION, POWDER, LYOPHILIZED, FOR SOLUTION INTRAVENOUS at 14:06

## 2017-12-18 RX ADMIN — FENTANYL CITRATE 50 MCG: 50 INJECTION, SOLUTION INTRAMUSCULAR; INTRAVENOUS at 22:43

## 2017-12-18 RX ADMIN — IOHEXOL 100 ML: 350 INJECTION, SOLUTION INTRAVENOUS at 15:31

## 2017-12-18 RX ADMIN — CEFTRIAXONE SODIUM 2 G: 2 INJECTION, POWDER, FOR SOLUTION INTRAMUSCULAR; INTRAVENOUS at 14:06

## 2017-12-18 RX ADMIN — HYDROMORPHONE HYDROCHLORIDE 1 MG: 2 INJECTION INTRAMUSCULAR; INTRAVENOUS; SUBCUTANEOUS at 18:19

## 2017-12-18 ASSESSMENT — PAIN SCALES - GENERAL
PAINLEVEL_OUTOF10: 4
PAINLEVEL_OUTOF10: 4
PAINLEVEL_OUTOF10: 5
PAINLEVEL_OUTOF10: 3

## 2017-12-18 NOTE — ED PROVIDER NOTES
ED Provider Note    Scribed for Jessa Holliday M.D. by Trish Zimmerman. 12/18/2017, 11:59 AM.    Primary care provider: Austen Saunders M.D.  Means of arrival: Walk-in  History obtained from: Patient  History limited by: None    CHIEF COMPLAINT  Chief Complaint   Patient presents with   • Abscess     Groin.  I&D 2x prior on same spot. On ABX x2 days - size increasing.       HPI  Lionel Zacarias is a 42 y.o. male who presents to the Emergency Department for suddenly worsening abscess to groin onset a week ago. The patient reports associated subjective fever, chills, and diaphoresis as well. He denies any vomiting. He reports having an abscess to the same spot previously and had an I&D performed by [facility]. His last abscess was 3-4 years ago. He has never had CT-scan performed previously for prior abscess.    REVIEW OF SYSTEMS  Pertinent positives include abscess to groin, subjective fever, chills, and diaphoresis. Pertinent negatives include no vomiting. See HPI for further details. All other systems reviewed and negative.   C.     PAST MEDICAL HISTORY   has a past medical history of Arthritis; At risk for sleep apnea; GOUT (12/2/2010); HTN (hypertension) (12/2/2010); Hypertension; DARIO (obstructive sleep apnea) (6/23/2017); and Snoring.    SURGICAL HISTORY   has a past surgical history that includes other orthopedic surgery; ventral hernia repair (N/A, 2/8/2017); omentectomy (N/A, 2/8/2017); and hip replacement, total.    SOCIAL HISTORY  Social History   Substance Use Topics   • Smoking status: Never Smoker   • Smokeless tobacco: Current User     Types: Chew   • Alcohol use 3.0 oz/week     6 Cans of beer per week      Comment: 2 per week      History   Drug Use No       FAMILY HISTORY  Family History   Problem Relation Age of Onset   • Heart Disease Mother 55     MI   • Lung Disease Maternal Grandmother      COPD   • Heart Disease Maternal Grandfather      MI   • Sleep Apnea Brother        CURRENT  "MEDICATIONS  Home Medications     Reviewed by Cheli Mcgregor, Student (Nurse Apprentice) on 12/18/17 at 1130  Med List Status: Partial   Medication Last Dose Status   allopurinol (ZYLOPRIM) 300 MG Tab 12/17/2017 Active   carvedilol (COREG) 6.25 MG Tab 12/17/2017 Active   doxycycline (VIBRAMYCIN) 100 MG Tab trimetha Active   furosemide (LASIX) 40 MG Tab 12/17/2017 Active   losartan (COZAAR) 100 MG Tab 12/17/2017 Active   spironolactone (ALDACTONE) 25 MG Tab 12/17/2017 Active   sulfamethoxazole-trimethoprim (BACTRIM DS) 800-160 MG tablet 12/17/2017 Active                ALLERGIES  Allergies   Allergen Reactions   • Morphine Unspecified     Nightmares terrors    • Pcn [Penicillins] Hives     RXN - YEARS AGO ; patient has taken Keflex before         PHYSICAL EXAM  VITAL SIGNS: BP (!) 161/105   Pulse 95   Temp 36.2 °C (97.1 °F) (Temporal)   Resp 18   Ht 1.905 m (6' 3\")   Wt (!) 194 kg (427 lb 11.1 oz)   SpO2 96%   BMI 53.46 kg/m²     General: WDWN, nontoxic appearing in NAD; A+Ox3; V/S as above afebrile.    Skin: warm and dry;   HEENT: NCAT; EOMs intact; PERRL; no scleral icterus   Neck: FROM; no meningismus, no LAD   Cardiovascular: Regular heart rate and rhythm. No murmurs, rubs, or gallops;  Lungs: Clear to auscultation with good air movement bilaterally. No wheezes, rhonchi, or rales.   Abdomen: BS present; soft; NTND; no rebound, guarding, or rigidity. No organomegaly or pulsatile mass; no CVAT   : Large fluctuant erythematous tender perineal abscess measuring at least 6cm in size  Extremities: SCHMITZ x 4; no e/o trauma; no pedal edema   Neurologic: CNs III-XII grossly intact; speech clear; distal sensation intact; strength 5/5 UE/LEs;   Psychiatric: Appropriate affect, normal mood                                                           DIAGNOSTIC STUDIES / PROCEDURES    LABS  Results for orders placed or performed during the hospital encounter of 12/18/17   CBC WITH DIFFERENTIAL   Result Value Ref " Range    WBC 17.1 (H) 4.8 - 10.8 K/uL    RBC 4.26 (L) 4.70 - 6.10 M/uL    Hemoglobin 12.9 (L) 14.0 - 18.0 g/dL    Hematocrit 38.5 (L) 42.0 - 52.0 %    MCV 90.4 81.4 - 97.8 fL    MCH 30.3 27.0 - 33.0 pg    MCHC 33.5 (L) 33.7 - 35.3 g/dL    RDW 42.7 35.9 - 50.0 fL    Platelet Count 334 164 - 446 K/uL    MPV 10.8 9.0 - 12.9 fL    Neutrophils-Polys 81.40 (H) 44.00 - 72.00 %    Lymphocytes 8.90 (L) 22.00 - 41.00 %    Monocytes 7.80 0.00 - 13.40 %    Eosinophils 1.10 0.00 - 6.90 %    Basophils 0.40 0.00 - 1.80 %    Immature Granulocytes 0.40 0.00 - 0.90 %    Nucleated RBC 0.00 /100 WBC    Neutrophils (Absolute) 13.95 (H) 1.82 - 7.42 K/uL    Lymphs (Absolute) 1.52 1.00 - 4.80 K/uL    Monos (Absolute) 1.33 (H) 0.00 - 0.85 K/uL    Eos (Absolute) 0.19 0.00 - 0.51 K/uL    Baso (Absolute) 0.07 0.00 - 0.12 K/uL    Immature Granulocytes (abs) 0.06 0.00 - 0.11 K/uL    NRBC (Absolute) 0.00 K/uL   BASIC METABOLIC PANEL   Result Value Ref Range    Sodium 138 135 - 145 mmol/L    Potassium 3.7 3.6 - 5.5 mmol/L    Chloride 105 96 - 112 mmol/L    Co2 22 20 - 33 mmol/L    Glucose 108 (H) 65 - 99 mg/dL    Bun 13 8 - 22 mg/dL    Creatinine 0.88 0.50 - 1.40 mg/dL    Calcium 8.8 8.5 - 10.5 mg/dL    Anion Gap 11.0 0.0 - 11.9   LACTIC ACID   Result Value Ref Range    Lactic Acid 1.7 0.5 - 2.0 mmol/L   ESTIMATED GFR   Result Value Ref Range    GFR If African American >60 >60 mL/min/1.73 m 2    GFR If Non African American >60 >60 mL/min/1.73 m 2   All labs reviewed by me.    RADIOLOGY  CT-PELVIS WITH   Final Result      Large RIGHT paramedian perineal subcutaneous fluid collection measuring 3.7 x 9.3 x 11.5 cm, potentially indicating abscess, with surrounding inflammatory changes extending into the posterior scrotum.      The radiologist's interpretation of all radiological studies have been reviewed by me.    COURSE & MEDICAL DECISION MAKING  Pertinent Labs & Imaging studies reviewed. (See chart for details)    Lionel Zacarias is a 42 y.o.  male who presents complaining of ***    12:28 PM - Patient seen and examined at bedside. Patient was treated with Vancomycin and Rocephin. Ordered CT-pelvis contrast, CBC, BMP, lactic acid, and blood culture x2 to evaluate his symptoms.    3:50 PM  Paging general surgery    4:18 PM      {EMSSDCNOTE or EMSSADMITNOTE}     FINAL IMPRESSION  1. Perineal abscess    2. Leukocytosis, unspecified type          I, Trish Zimmerman (Yuki), am scribing for, and in the presence of, Jessa Holliday M.D..    Electronically signed by: Trish Zimmerman (Scribely), 12/18/2017    I, Jessa Holliday M.D. personally performed the services described in this documentation, as scribed by Trish Zimmerman in my presence, and it is both accurate and complete.    {ERP Attestation (ERP ONLY):[id number]}

## 2017-12-18 NOTE — ED NOTES
"Pt ambulate to room, given gown to change. Was seen at [facility] Saturday, given antibiotics for abscess. Pt states \"is getting worse\", has HX of abscess.  "

## 2017-12-18 NOTE — ED NOTES
Lionel Zacarias 42 y.o. male     Chief Complaint   Patient presents with   • Abscess     Groin.  I&D 2x prior on same spot. On ABX x2 days - size increasing.      Pt returned to lobby and educated on triage process.  Advised to notify RN with changes or concerns.

## 2017-12-18 NOTE — ED PROVIDER NOTES
ED Provider Note    Scribed for Jessa Holliday M.D. by Trish Zimmerman. 12/18/2017, 11:59 AM.    Primary care provider: Austen Saunders M.D.  Means of arrival: Walk-in  History obtained from: Patient  History limited by: None    CHIEF COMPLAINT  Chief Complaint   Patient presents with   • Abscess     Groin.  I&D 2x prior on same spot. On ABX x2 days - size increasing.       HPI  Lionel Zacarias is a 42 y.o. male who presents to the Emergency Department for suddenly worsening abscess to groin onset a week ago. The patient reports associated subjective fever, chills, and diaphoresis as well. He denies any vomiting. He reports having an abscess to the same spot previously and had an I&D performed by hospitals. His last abscess was 3-4 years ago. He has never had CT-scan performed previously for prior abscess. He has an allergy to Morphine.     REVIEW OF SYSTEMS  Pertinent positives include abscess to groin, subjective fever, chills, and diaphoresis. Pertinent negatives include no vomiting. See HPI for further details. All other systems reviewed and negative.   C.     PAST MEDICAL HISTORY   has a past medical history of Arthritis; At risk for sleep apnea; GOUT (12/2/2010); HTN (hypertension) (12/2/2010); Hypertension; DARIO (obstructive sleep apnea) (6/23/2017); and Snoring.    SURGICAL HISTORY   has a past surgical history that includes other orthopedic surgery; ventral hernia repair (N/A, 2/8/2017); omentectomy (N/A, 2/8/2017); and hip replacement, total.    SOCIAL HISTORY  Social History   Substance Use Topics   • Smoking status: Never Smoker   • Smokeless tobacco: Current User     Types: Chew   • Alcohol use 3.0 oz/week     6 Cans of beer per week      Comment: 2 per week      History   Drug Use No       FAMILY HISTORY  Family History   Problem Relation Age of Onset   • Heart Disease Mother 55     MI   • Lung Disease Maternal Grandmother      COPD   • Heart Disease Maternal Grandfather      MI   •  "Sleep Apnea Brother        CURRENT MEDICATIONS  Home Medications     Reviewed by Javon Denis (Pharmacy Tech) on 12/18/17 at 1627  Med List Status: Complete   Medication Last Dose Status   allopurinol (ZYLOPRIM) 300 MG Tab 12/17/2017 Active   carvedilol (COREG) 6.25 MG Tab 12/17/2017 Active   doxycycline (VIBRAMYCIN) 100 MG Tab 12/17/2017 Active   furosemide (LASIX) 40 MG Tab 12/17/2017 Active   ibuprofen (MOTRIN) 200 MG Tab 12/18/2017 Active   losartan (COZAAR) 100 MG Tab 12/17/2017 Active   spironolactone (ALDACTONE) 25 MG Tab 12/17/2017 Active   sulfamethoxazole-trimethoprim (BACTRIM DS) 800-160 MG tablet 12/17/2017 Active                ALLERGIES  Allergies   Allergen Reactions   • Morphine Unspecified     Nightmares terrors    • Pcn [Penicillins] Hives     RXN - YEARS AGO ; patient has taken Keflex before         PHYSICAL EXAM  VITAL SIGNS: BP (!) 161/105   Pulse 95   Temp 36.2 °C (97.1 °F) (Temporal)   Resp 18   Ht 1.905 m (6' 3\")   Wt (!) 194 kg (427 lb 11.1 oz)   SpO2 96%   BMI 53.46 kg/m²     General: WD morbidly obese, nontoxic appearing in NAD; A+Ox3; V/S as above afebrile.    Skin: warm and dry;   HEENT: NCAT; EOMs intact; PERRL; no scleral icterus   Neck: FROM; Supple  Cardiovascular: Regular heart rate and rhythm. No murmurs, rubs, or gallops;  Lungs: Clear to auscultation with good air movement bilaterally. No wheezes, rhonchi, or rales.   Abdomen: BS present; soft; NTND; no rebound, guarding, or rigidity. No organomegaly or pulsatile mass; no CVAT   : Large fluctuant erythematous tender perineal abscess measuring at least 6cm in size  Extremities: SCHMITZ x 4; no e/o trauma; no pedal edema   Neurologic: CNs III-XII grossly intact; speech clear; distal sensation intact; strength 5/5 UE/LEs;   Psychiatric: Appropriate affect, normal mood                                                           DIAGNOSTIC STUDIES / PROCEDURES    LABS  Results for orders placed or performed during the " hospital encounter of 12/18/17   CBC WITH DIFFERENTIAL   Result Value Ref Range    WBC 17.1 (H) 4.8 - 10.8 K/uL    RBC 4.26 (L) 4.70 - 6.10 M/uL    Hemoglobin 12.9 (L) 14.0 - 18.0 g/dL    Hematocrit 38.5 (L) 42.0 - 52.0 %    MCV 90.4 81.4 - 97.8 fL    MCH 30.3 27.0 - 33.0 pg    MCHC 33.5 (L) 33.7 - 35.3 g/dL    RDW 42.7 35.9 - 50.0 fL    Platelet Count 334 164 - 446 K/uL    MPV 10.8 9.0 - 12.9 fL    Neutrophils-Polys 81.40 (H) 44.00 - 72.00 %    Lymphocytes 8.90 (L) 22.00 - 41.00 %    Monocytes 7.80 0.00 - 13.40 %    Eosinophils 1.10 0.00 - 6.90 %    Basophils 0.40 0.00 - 1.80 %    Immature Granulocytes 0.40 0.00 - 0.90 %    Nucleated RBC 0.00 /100 WBC    Neutrophils (Absolute) 13.95 (H) 1.82 - 7.42 K/uL    Lymphs (Absolute) 1.52 1.00 - 4.80 K/uL    Monos (Absolute) 1.33 (H) 0.00 - 0.85 K/uL    Eos (Absolute) 0.19 0.00 - 0.51 K/uL    Baso (Absolute) 0.07 0.00 - 0.12 K/uL    Immature Granulocytes (abs) 0.06 0.00 - 0.11 K/uL    NRBC (Absolute) 0.00 K/uL   BASIC METABOLIC PANEL   Result Value Ref Range    Sodium 138 135 - 145 mmol/L    Potassium 3.7 3.6 - 5.5 mmol/L    Chloride 105 96 - 112 mmol/L    Co2 22 20 - 33 mmol/L    Glucose 108 (H) 65 - 99 mg/dL    Bun 13 8 - 22 mg/dL    Creatinine 0.88 0.50 - 1.40 mg/dL    Calcium 8.8 8.5 - 10.5 mg/dL    Anion Gap 11.0 0.0 - 11.9   LACTIC ACID   Result Value Ref Range    Lactic Acid 1.7 0.5 - 2.0 mmol/L   ESTIMATED GFR   Result Value Ref Range    GFR If African American >60 >60 mL/min/1.73 m 2    GFR If Non African American >60 >60 mL/min/1.73 m 2   All labs reviewed by me.    RADIOLOGY  CT-PELVIS WITH   Final Result      Large RIGHT paramedian perineal subcutaneous fluid collection measuring 3.7 x 9.3 x 11.5 cm, potentially indicating abscess, with surrounding inflammatory changes extending into the posterior scrotum.      The radiologist's interpretation of all radiological studies have been reviewed by me.    COURSE & MEDICAL DECISION MAKING  Pertinent Labs & Imaging  studies reviewed. (See chart for details)    Differential Diagnoses include but are not limited to Amanda's vs abscess    12:28 PM - Patient seen and examined at bedside. Patient was treated with Vancomycin. Ordered CT-pelvis contrast, CBC, BMP, lactic acid, and blood culture x2 to evaluate his symptoms.    3:50 PM Paging general surgery and urology    4:15 PM Discussed with Dr. Barry from general surgery who agrees to see the patient. She feels urology does not need to be consulted at this time. However, I already have a call out to urology and will await their input.     4:39 PM I discussed the case with the urologist on call. He will review the CT scan and call me back if he feels urology needs to be involved but feels comfortable with surgery taking care of this for now.    4:52 PM Patient was reevaluated at bedside. He continues to complain of pain. Discussed lab and radiology results with the patient and informed them of the results shown above. The patient will be taken to OR by Dr. Baryr to have abscess drained. He understands and is agreeable to plan of care.     6:15 PM Patient is complaining of pain while awaiting transfer to OR. He will receive a dose of Dilaudid 1mg.     7:05 PM  I contacted Dr. Barry to inquire about timing and the plan on taking the patient to the OR. She states she is waiting to hear back from the OR.    8:27 PM  I entered the room to find the patient tachycardic at 140s and febrile with SBP 80s.  I ordered 2 large bore IVs and NS 30cc/kg bolus per sepsis protocol.  Pt's perineum re-examined.  Tender, erythematous, edematous.  Surgery paged.  Charge RN notified.    8:34 PM  I advised Dr. Barry that the patient is now febrile, tachycardic, and hypotensive. Pt will be moved to Red pod.    I discussed the case with the nursing supervisor who will call the OR to check on availability of OR suites.    Dr. Barry present in the ER.    8:54 PM  Patient's blood pressure 100 systolic  now.  Patient was transported to the OR    The total critical care time on this patient is 40 minutes, resuscitating patient, speaking with admitting physician, and deciphering test results. This 40 minutes is exclusive of separately billable procedures.      DISPOSITION:  Patient will be admitted to Dr. Barry in guarded condition.     FINAL IMPRESSION  1. Perineal abscess    2. Leukocytosis, unspecified type    3. Sepsis affecting skin (CMS-[facility])          Trish SANDY (Scribe), am scribing for, and in the presence of, Jessa Holliday M.D..    Electronically signed by: Trish Zimmerman (Yuki), 12/18/2017    IJessa M.D. personally performed the services described in this documentation, as scribed by Trish Zimmerman in my presence, and it is both accurate and complete.    The note accurately reflects work and decisions made by me.  Jessa Holliday  12/18/2017  4:41 PM

## 2017-12-19 LAB
ANION GAP SERPL CALC-SCNC: 10 MMOL/L (ref 0–11.9)
BUN SERPL-MCNC: 10 MG/DL (ref 8–22)
CA-I SERPL-SCNC: 1 MMOL/L (ref 1.1–1.3)
CALCIUM SERPL-MCNC: 7.8 MG/DL (ref 8.5–10.5)
CHLORIDE SERPL-SCNC: 103 MMOL/L (ref 96–112)
CO2 SERPL-SCNC: 23 MMOL/L (ref 20–33)
CREAT SERPL-MCNC: 0.91 MG/DL (ref 0.5–1.4)
ERYTHROCYTE [DISTWIDTH] IN BLOOD BY AUTOMATED COUNT: 45.1 FL (ref 35.9–50)
GFR SERPL CREATININE-BSD FRML MDRD: >60 ML/MIN/1.73 M 2
GLUCOSE SERPL-MCNC: 104 MG/DL (ref 65–99)
GRAM STN SPEC: NORMAL
HCT VFR BLD AUTO: 33.4 % (ref 42–52)
HGB BLD-MCNC: 10.9 G/DL (ref 14–18)
MAGNESIUM SERPL-MCNC: 1.5 MG/DL (ref 1.5–2.5)
MCH RBC QN AUTO: 30.7 PG (ref 27–33)
MCHC RBC AUTO-ENTMCNC: 32.6 G/DL (ref 33.7–35.3)
MCV RBC AUTO: 94.1 FL (ref 81.4–97.8)
PHOSPHATE SERPL-MCNC: 4.2 MG/DL (ref 2.5–4.5)
PLATELET # BLD AUTO: 299 K/UL (ref 164–446)
PMV BLD AUTO: 10.7 FL (ref 9–12.9)
POTASSIUM SERPL-SCNC: 3.6 MMOL/L (ref 3.6–5.5)
RBC # BLD AUTO: 3.55 M/UL (ref 4.7–6.1)
SIGNIFICANT IND 70042: NORMAL
SITE SITE: NORMAL
SODIUM SERPL-SCNC: 136 MMOL/L (ref 135–145)
SOURCE SOURCE: NORMAL
WBC # BLD AUTO: 17.3 K/UL (ref 4.8–10.8)

## 2017-12-19 PROCEDURE — 700111 HCHG RX REV CODE 636 W/ 250 OVERRIDE (IP): Performed by: INTERNAL MEDICINE

## 2017-12-19 PROCEDURE — 700102 HCHG RX REV CODE 250 W/ 637 OVERRIDE(OP): Performed by: SURGERY

## 2017-12-19 PROCEDURE — A9270 NON-COVERED ITEM OR SERVICE: HCPCS | Performed by: SURGERY

## 2017-12-19 PROCEDURE — 700105 HCHG RX REV CODE 258: Performed by: INTERNAL MEDICINE

## 2017-12-19 PROCEDURE — A9270 NON-COVERED ITEM OR SERVICE: HCPCS | Performed by: INTERNAL MEDICINE

## 2017-12-19 PROCEDURE — 700102 HCHG RX REV CODE 250 W/ 637 OVERRIDE(OP): Performed by: INTERNAL MEDICINE

## 2017-12-19 PROCEDURE — 700101 HCHG RX REV CODE 250: Performed by: INTERNAL MEDICINE

## 2017-12-19 PROCEDURE — 83735 ASSAY OF MAGNESIUM: CPT

## 2017-12-19 PROCEDURE — 80048 BASIC METABOLIC PNL TOTAL CA: CPT

## 2017-12-19 PROCEDURE — 84100 ASSAY OF PHOSPHORUS: CPT

## 2017-12-19 PROCEDURE — 700111 HCHG RX REV CODE 636 W/ 250 OVERRIDE (IP): Performed by: SURGERY

## 2017-12-19 PROCEDURE — 85027 COMPLETE CBC AUTOMATED: CPT

## 2017-12-19 PROCEDURE — 82330 ASSAY OF CALCIUM: CPT

## 2017-12-19 PROCEDURE — 770020 HCHG ROOM/CARE - TELE (206)

## 2017-12-19 RX ORDER — FLECAINIDE ACETATE 100 MG/1
100 TABLET ORAL TWICE DAILY
Status: DISCONTINUED | OUTPATIENT
Start: 2017-12-19 | End: 2017-12-20 | Stop reason: HOSPADM

## 2017-12-19 RX ORDER — POTASSIUM CHLORIDE 20 MEQ/1
40 TABLET, EXTENDED RELEASE ORAL ONCE
Status: COMPLETED | OUTPATIENT
Start: 2017-12-19 | End: 2017-12-19

## 2017-12-19 RX ORDER — CALCIUM CHLORIDE 100 MG/ML
1 INJECTION INTRAVENOUS; INTRAVENTRICULAR ONCE
Status: COMPLETED | OUTPATIENT
Start: 2017-12-19 | End: 2017-12-19

## 2017-12-19 RX ORDER — MAGNESIUM SULFATE HEPTAHYDRATE 40 MG/ML
2 INJECTION, SOLUTION INTRAVENOUS ONCE
Status: COMPLETED | OUTPATIENT
Start: 2017-12-19 | End: 2017-12-19

## 2017-12-19 RX ORDER — SODIUM CHLORIDE 9 MG/ML
INJECTION, SOLUTION INTRAVENOUS
Status: DISCONTINUED
Start: 2017-12-19 | End: 2017-12-19

## 2017-12-19 RX ORDER — METRONIDAZOLE 500 MG/1
500 TABLET ORAL EVERY 8 HOURS
Status: DISCONTINUED | OUTPATIENT
Start: 2017-12-19 | End: 2017-12-20 | Stop reason: HOSPADM

## 2017-12-19 RX ADMIN — CEFEPIME 2 G: 2 INJECTION, POWDER, FOR SOLUTION INTRAMUSCULAR; INTRAVENOUS at 05:32

## 2017-12-19 RX ADMIN — HEPARIN SODIUM 5000 UNITS: 5000 INJECTION, SOLUTION INTRAVENOUS; SUBCUTANEOUS at 01:11

## 2017-12-19 RX ADMIN — METOPROLOL TARTRATE 25 MG: 25 TABLET, FILM COATED ORAL at 18:03

## 2017-12-19 RX ADMIN — CEFEPIME 2 G: 2 INJECTION, POWDER, FOR SOLUTION INTRAMUSCULAR; INTRAVENOUS at 02:13

## 2017-12-19 RX ADMIN — CEFEPIME 2 G: 2 INJECTION, POWDER, FOR SOLUTION INTRAMUSCULAR; INTRAVENOUS at 21:20

## 2017-12-19 RX ADMIN — METRONIDAZOLE 500 MG: 500 TABLET ORAL at 18:04

## 2017-12-19 RX ADMIN — POTASSIUM CHLORIDE 40 MEQ: 1500 TABLET, EXTENDED RELEASE ORAL at 18:03

## 2017-12-19 RX ADMIN — OXYCODONE HYDROCHLORIDE 5 MG: 5 TABLET ORAL at 10:29

## 2017-12-19 RX ADMIN — OXYCODONE HYDROCHLORIDE 5 MG: 5 TABLET ORAL at 00:56

## 2017-12-19 RX ADMIN — OXYCODONE HYDROCHLORIDE 2.5 MG: 5 TABLET ORAL at 14:19

## 2017-12-19 RX ADMIN — METRONIDAZOLE 500 MG: 500 TABLET ORAL at 10:29

## 2017-12-19 RX ADMIN — STANDARDIZED SENNA CONCENTRATE AND DOCUSATE SODIUM 2 TABLET: 8.6; 5 TABLET, FILM COATED ORAL at 00:57

## 2017-12-19 RX ADMIN — CARVEDILOL 6.25 MG: 6.25 TABLET, FILM COATED ORAL at 05:33

## 2017-12-19 RX ADMIN — MAGNESIUM SULFATE IN WATER 2 G: 40 INJECTION, SOLUTION INTRAVENOUS at 15:57

## 2017-12-19 RX ADMIN — STANDARDIZED SENNA CONCENTRATE AND DOCUSATE SODIUM 2 TABLET: 8.6; 5 TABLET, FILM COATED ORAL at 08:58

## 2017-12-19 RX ADMIN — HEPARIN SODIUM 5000 UNITS: 5000 INJECTION, SOLUTION INTRAVENOUS; SUBCUTANEOUS at 21:20

## 2017-12-19 RX ADMIN — CEFOTETAN DISODIUM 2 G: 2 INJECTION, POWDER, FOR SOLUTION INTRAMUSCULAR; INTRAVENOUS at 00:57

## 2017-12-19 RX ADMIN — FUROSEMIDE 40 MG: 40 TABLET ORAL at 05:33

## 2017-12-19 RX ADMIN — VANCOMYCIN HYDROCHLORIDE 2000 MG: 100 INJECTION, POWDER, LYOPHILIZED, FOR SOLUTION INTRAVENOUS at 15:57

## 2017-12-19 RX ADMIN — OXYCODONE HYDROCHLORIDE 5 MG: 5 TABLET ORAL at 06:30

## 2017-12-19 RX ADMIN — FUROSEMIDE 40 MG: 40 TABLET ORAL at 15:58

## 2017-12-19 RX ADMIN — VANCOMYCIN HYDROCHLORIDE 2000 MG: 100 INJECTION, POWDER, LYOPHILIZED, FOR SOLUTION INTRAVENOUS at 03:12

## 2017-12-19 RX ADMIN — METRONIDAZOLE 500 MG: 500 INJECTION, SOLUTION INTRAVENOUS at 02:13

## 2017-12-19 RX ADMIN — CEFEPIME 2 G: 2 INJECTION, POWDER, FOR SOLUTION INTRAMUSCULAR; INTRAVENOUS at 14:18

## 2017-12-19 RX ADMIN — HEPARIN SODIUM 5000 UNITS: 5000 INJECTION, SOLUTION INTRAVENOUS; SUBCUTANEOUS at 05:33

## 2017-12-19 RX ADMIN — CALCIUM CHLORIDE 1 G: 100 INJECTION, SOLUTION INTRAVENOUS; INTRAVENTRICULAR at 12:24

## 2017-12-19 ASSESSMENT — PAIN SCALES - GENERAL
PAINLEVEL_OUTOF10: 8
PAINLEVEL_OUTOF10: 0
PAINLEVEL_OUTOF10: 0
PAINLEVEL_OUTOF10: 5
PAINLEVEL_OUTOF10: 4
PAINLEVEL_OUTOF10: 0
PAINLEVEL_OUTOF10: 0
PAINLEVEL_OUTOF10: 2
PAINLEVEL_OUTOF10: 6
PAINLEVEL_OUTOF10: 10
PAINLEVEL_OUTOF10: 4
PAINLEVEL_OUTOF10: 0
PAINLEVEL_OUTOF10: 4
PAINLEVEL_OUTOF10: 5
PAINLEVEL_OUTOF10: 8

## 2017-12-19 ASSESSMENT — ENCOUNTER SYMPTOMS
FEVER: 0
VOMITING: 0
SPUTUM PRODUCTION: 0
DIZZINESS: 0
ORTHOPNEA: 0
LOSS OF CONSCIOUSNESS: 0
FOCAL WEAKNESS: 0
CHILLS: 0
BLURRED VISION: 0
PALPITATIONS: 1
NAUSEA: 0

## 2017-12-19 ASSESSMENT — LIFESTYLE VARIABLES
AVERAGE NUMBER OF DAYS PER WEEK YOU HAVE A DRINK CONTAINING ALCOHOL: 1
DOES PATIENT WANT TO STOP DRINKING: NO
TOTAL SCORE: 3
HOW MANY TIMES IN THE PAST YEAR HAVE YOU HAD 5 OR MORE DRINKS IN A DAY: 0
EVER HAD A DRINK FIRST THING IN THE MORNING TO STEADY YOUR NERVES TO GET RID OF A HANGOVER: YES
ON A TYPICAL DAY WHEN YOU DRINK ALCOHOL HOW MANY DRINKS DO YOU HAVE: 1
EVER_SMOKED: NEVER
CONSUMPTION TOTAL: POSITIVE
TOTAL SCORE: 3
HAVE YOU EVER FELT YOU SHOULD CUT DOWN ON YOUR DRINKING: YES
ALCOHOL_USE: YES
TOTAL SCORE: 3
HAVE PEOPLE ANNOYED YOU BY CRITICIZING YOUR DRINKING: YES
EVER FELT BAD OR GUILTY ABOUT YOUR DRINKING: NO

## 2017-12-19 ASSESSMENT — PATIENT HEALTH QUESTIONNAIRE - PHQ9
SUM OF ALL RESPONSES TO PHQ QUESTIONS 1-9: 0
2. FEELING DOWN, DEPRESSED, IRRITABLE, OR HOPELESS: NOT AT ALL
SUM OF ALL RESPONSES TO PHQ9 QUESTIONS 1 AND 2: 0
1. LITTLE INTEREST OR PLEASURE IN DOING THINGS: NOT AT ALL

## 2017-12-19 NOTE — PROGRESS NOTES
Pt glasses missing.  This RN called ER where pt said he left them on his tray, ER said they could not find the glasses.  Glasses not in pt's pink belongings bag in the PACU.

## 2017-12-19 NOTE — DIETARY
"Nutrition Services: Poor PO intake; high BMI    Pt is a 42 y.o. Male with Dx: Abscess    PMH: Groin abscess ~3 years ago  Ht: 75\", Wt: 183 kg, BMI= 50.43    Admit day 1. S/p I&D 12/18.   Pt on a regular diet and states eating well. REE per MSJ x1-1.1 = 3010-6450 kcal/day.  Pt's current meal plan is only providing ~1900 kcal/day. Offered snacks, but pt declined, said he is getting enough to eat. Nutrition Representative seeing pt daily for menu options.  Pt is morbidly obese with BMI >40; formal weight loss counseling not appropriate in acute care setting.  Pt stable for transfer out of ICU.    Recommend outpatient weight management after D/c.  RD available as further indicated.    "

## 2017-12-19 NOTE — ED NOTES
Med rec complete per Pt at bedside  Allergies reviewed  Pt is currently on a 7 day course of Bactrim and  A 10 day course of Doxycycline  Both course started on 12/16

## 2017-12-19 NOTE — PROGRESS NOTES
1340: late entry      Report received form SAMMIE Young. Assumed care of patient. Patient resting in bed. Complaining of 5/10 pain. Interventions in place. Call light within reach bed in low position.

## 2017-12-19 NOTE — PROGRESS NOTES
"Pharmacy Kinetics 42 y.o. male on vancomycin day # 1 2017    Currently on Vancomycin New Start   Other antibiotics: cefepime 2 g IV q8h, metronidazole 500 mg IV q8h    Indication for Treatment: Perineal abscess    Pertinent history per medical record: Admitted on 2017 for worsening abscess to groin.  The patient reports that he previously had an abscess in the same spot requiring I&D by [facility] (3-4 years ago).  While in the ED the patient became febrile, hypotensive and hypotensive and taken directly to the OR for I&D. Per review of the surgical notes, there was no evidence of a necrotizing infection and identified a 10 cm abscess in the perineal and gluteal region with 150 mL of purulence.  Intraoperative cultures were collected.  Broad-spectrum antibiotics initiated.      Allergies: Morphine and Pcn [penicillins]     List concerns for renal function: obese (BMI 54)    Pertinent cultures to date:   17 - Peripheral BC x 2: in process  17 - Wound cx: in process     Recent Labs      17   1326   WBC  17.1*   NEUTSPOLYS  81.40*     Recent Labs      17   1326  17   0100   BUN  13  10   CREATININE  0.88  0.91     No results for input(s): VANCOTROUGH, VANCOPEAK, VANCORANDOM in the last 72 hours.  Intake/Output Summary (Last 24 hours) at 17 0215  Last data filed at 17 0000   Gross per 24 hour   Intake             1100 ml   Output              475 ml   Net              625 ml      Blood pressure 120/67, pulse 75, temperature 36 °C (96.8 °F), resp. rate 20, height 1.905 m (6' 3\"), weight (!) 194 kg (427 lb 11.1 oz), SpO2 92 %. Temp (24hrs), Av.2 °C (99 °F), Min:35.9 °C (96.6 °F), Max:39.2 °C (102.5 °F)      A/P   1. Vancomycin dose change: 2000 mg IV q12h x 2 (0230/1430)  2. Next vancomycin level:  at 0830, 18 hour level (ordered)   3. Goal trough: 12-16 mcg/mL   4. Comments: Patient was given vancomycin 3000 mg IV x 1 as loading dose while in the ED.  " Upon arrival to [facility], intensivist resumed vancomycin.  Given body habitus and timing of loading dose of vancomycin, will start patient on vancomycin 2000 mg IV q12h x 2 doses to start 12 hours after loading dose was administered yesterday.  I have ordered an 18 hour random level after the second 2000 mg dose.  Clinical pharmacist to evaluate level and initiate scheduled dosing as appropriate.  Pharmacy will continue to follow cultures and recommend de-escalation of antibiotics if continued MRSA coverage is no longer indicated.    Marisa Reilly, PharmD, BCPS

## 2017-12-19 NOTE — FACE TO FACE
Face to Face Supporting Documentation - Home Health    The encounter with this patient was in whole or in part the primary reason for home health admission.    Date of encounter:   Patient:                    MRN:                       YOB: 2019  Lionel Zacarias  [id number]  1975     Home health to see patient for:  Dressing changes at home  Patient needs assistance, lives in [facility] need for:  Surgical Aftercare Wound care    Skilled nursing interventions to include:  Wound Care    Homebound status evidenced by:  Have a condition such that leaving his or her home is medically contraindicated. Leaving home requires a considerable and taxing effort. There is a normal inability to leave the home.    Community Physician to provide follow up care: Austen Saunders M.D.     Optional Interventions? No      I certify the face to face encounter for this home health care referral meets the CMS requirements and the encounter/clinical assessment with the patient was, in whole, or in part, for the medical condition(s) listed above, which is the primary reason for home health care. Based on my clinical findings: the service(s) are medically necessary, support the need for home health care, and the homebound criteria are met.  I certify that this patient has had a face to face encounter by myself.  Christine Barry M.D. - NPI: [id number]

## 2017-12-19 NOTE — OP REPORT
DATE OF SERVICE:  12/18/2017    PREOPERATIVE DIAGNOSIS:  Perineal abscess.    POSTOPERATIVE DIAGNOSIS:  Perineal abscess.    PROCEDURE PERFORMED:  Incision and drainage of perineal abscess.    SURGEON:  Christine Barry MD    ANESTHESIOLOGIST:  Germania Wagner MD    ANESTHESIA:  GET.    FINDINGS:  A 10 cm abscess cavity in the perineal and right gluteal region   with 150 mL of purulence, no sign of necrotizing infection.    COMPLICATIONS:  None.    ESTIMATED BLOOD LOSS:  20 mL.    SPECIMENS:  Aerobic and anaerobic cultures and sensitivities.    PROCEDURE IN DETAIL:  Patient was consented preoperatively, taken to operating   room and placed in the supine position.  Anesthesia was induced and LMA was   placed.  He was then carefully placed in the _____ position and the perianal   perineal region were then prepped and draped.  Patient had an 8 cm indurated   fluctuant area in the perineum approximately 6 cm from the anal verge at 11   o'clock and there was induration and edema extending to the right gluteal   region.  A 6 cm oblique incision was made overlying the area of fluctuance   deepened down through the subcutaneous tissue bluntly and the abscess cavity   was opened, approximately 150 mL of purulent drainage was encountered.    Cultures were sent.  The cavity was irrigated out copiously and was bluntly   explored breaking up all loculations.  The cavity was estimated to be around   10 cm in greatest dimension.  After it was irrigated out and hemostasis was   confirmed, Betadine soaked Kerlix packing was placed within the wound bed and   gauze dressings were then applied.  All lap, sponge and instrument counts were   correct at the end the case x2.  The patient remained hemodynamically stable   throughout the case.  He was awakened from anesthesia and taken to the   postanesthesia care unit in stable condition.       ____________________________________     Christine Barry MD    AEP / NTS    DD:  12/18/2017 22:47:16  DT:   12/18/2017 23:02:56    D#:  [id number]  Job#:  [id number]

## 2017-12-19 NOTE — DISCHARGE PLANNING
Care Transition Team Assessment    Information Source  Orientation : Oriented x 4  Information Given By: Patient  Who is responsible for making decisions for patient? : Patient    Readmission Evaluation  Is this a readmission?:  (Ventricular tachycardia 6/24/17)    Elopement Risk  Legal Hold: No  Ambulatory or Self Mobile in Wheelchair: No-Not an Elopement Risk    Interdisciplinary Discharge Planning  Does Admitting Nurse Feel This Could be a Complex Discharge?: No  Primary Care Physician: Dr. Saunders  Lives with - Patient's Self Care Capacity: Child Less than 18 Years of Age (15yr daughter)  Support Systems: Children, Parent, Spouse / Significant Other  Housing / Facility: [address]  Able to Return to Previous ADL's: Yes  Mobility Issues: Yes  Patient Expects to be Discharged to:: Medical/surgical/home  Assistance Needed: No  Durable Medical Equipment: Walker, Other - Specify, Home Oxygen (Cane)    Discharge Preparedness  What is your plan after discharge?: Home health care  What are your discharge supports?: Child  Prior Functional Level: Independent with Activities of Daily Living  Difficulity with ADLs: Walking  Difficulty with ADLs Comment:  (Cane or walker)  Difficulity with IADLs: None    Functional Assesment  Prior Functional Level: Independent with Activities of Daily Living    Finances  Financial Barriers to Discharge: No  Prescription Coverage: Yes    Vision / Hearing Impairment  Vision Impairment : Yes  Right Eye Vision: Impaired, Wears Glasses  Left Eye Vision: Impaired, Wears Glasses  Hearing Impairment : No    Values / Beliefs / Concerns  Values / Beliefs Concerns : No    Advance Directive  Advance Directive?: None    Domestic Abuse  Have you ever been the victim of abuse or violence?: No  Physical Abuse or Sexual Abuse: No  Verbal Abuse or Emotional Abuse: No  Possible Abuse Reported to:: Not Applicable    Psychological Assessment  History of Substance Abuse: None  History of Psychiatric  Problems: No    Discharge Risks or Barriers  Discharge risks or barriers?: No    Anticipated Discharge Information  Anticipated discharge disposition: [facility]  Discharge Address:  ([address] [place])

## 2017-12-19 NOTE — CONSULTS
"Pulmonary & Critical Care Consult Note    DATE OF CONSULTATION:  12/18/2017     REFERRING PHYSICIAN:  Christine Barry M.D.     CONSULTANT:  Fidel Robins DO     REASON FOR CONSULTATION:  Sepsis, wide complex tachycardia     HISTORY OF PRESENT ILLNESS:  42 yom pmhx of HTN, DARIO (on home CPAP - unknown support) and gout presented to the [facility] with a complaint of 1 week of groin pain and abscess, a CT was obtained demonstrating: \"Large RIGHT paramedian perineal subcutaneous fluid collection measuring 3.7 x 9.3 x 11.5 cm, potentially indicating abscess, with surrounding inflammatory changes extending into the posterior scrotum\". His WBC was found to be 17.1 and his BP was noted to be marginally low and responded well to crystalloid resuscitation, while in the ER he was noted to have a wide complex tachycardia (per patient he has a hx of the same but a negative cardiac evaluation). The patient was taken to the OR and the abscess was I&Ded without any signs of necrotizing infection, intra-operatively the WCT returned and converted with IV lidocaine. I am consult post-operatively for critical care management of his sepsis and WCT. Cardiology was contacted intra-operatively and will consult.     PAST MEDICAL HISTORY:   Past Medical History:   Diagnosis Date   • Arthritis     joints   • At risk for sleep apnea    • GOUT 12/2/2010   • HTN (hypertension) 12/2/2010 2017 pt states fairly well controlled   • Hypertension    • DARIO (obstructive sleep apnea) 6/23/2017   • Snoring         PAST SURGICAL HISTORY:   Past Surgical History:   Procedure Laterality Date   • VENTRAL HERNIA REPAIR N/A 2/8/2017    Procedure: VENTRAL HERNIA REPAIR W/MESH;  Surgeon: Daniel De Oliveira M.D.;  Location: SURGERY [facility];  Service:    • OMENTECTOMY N/A 2/8/2017    Procedure: OMENTECTOMY;  Surgeon: Daniel De Oliveira M.D.;  Location: SURGERY [facility];  Service:    • HIP REPLACEMENT, TOTAL     • OTHER ORTHOPEDIC SURGERY      r hip        " ALLERGIES:   Morphine and Pcn [penicillins]     MEDICATIONS PRIOR TO ADMISSION:   No current facility-administered medications on file prior to encounter.      Current Outpatient Prescriptions on File Prior to Encounter   Medication Sig Dispense Refill   • carvedilol (COREG) 6.25 MG Tab Take 1 Tab by mouth 2 times a day, with meals. 60 Tab 4   • spironolactone (ALDACTONE) 25 MG Tab Take 1 Tab by mouth every day. 30 Tab 1   • allopurinol (ZYLOPRIM) 300 MG Tab Take 300 mg by mouth every day.     • losartan (COZAAR) 100 MG Tab Take 100 mg by mouth every day.     • furosemide (LASIX) 40 MG Tab Take 40 mg by mouth 2 Times a Day.         SOCIAL HISTORY:   Social History     Social History   • Marital status: Single     Spouse name: N/A   • Number of children: N/A   • Years of education: N/A     Occupational History   • Not on file.     Social History Main Topics   • Smoking status: Never Smoker   • Smokeless tobacco: Current User     Types: Chew   • Alcohol use 3.0 oz/week     6 Cans of beer per week      Comment: 2 per week   • Drug use: No   • Sexual activity: Not Currently     Other Topics Concern   • Not on file     Social History Narrative   • No narrative on file       FAMILY HISTORY:  Family History   Problem Relation Age of Onset   • Heart Disease Mother 55     MI   • Lung Disease Maternal Grandmother      COPD   • Heart Disease Maternal Grandfather      MI   • Sleep Apnea Brother         REVIEW OF SYSTEMS:   Constitutional: + fever, + chills,  Respiratory: No cough, no hemoptysis, no dyspnea  Cardiovascular: No chest pain, no palpitations, no orthopnea, no PND, no lower extremity swelling  GI: No nausea, no vomiting, no abdominal pain, no diarrhea, no constipation, no hematochezia, no melena  : no dysuria, no frequency, no urgency  Endocrine: No polyuria, no polydipsia, no hair loss  Hematology: No easy bruising, no bleeding  Musculoskeletal: No joint swelling, no joint erythema, no arthralgia, no  "myalgias  Neuro: No seizures, no numbness, no tingling sensation, no extremity weakness, no change in vision.  Psychiatry: no depression, no suicidal ideation     PHYSICAL EXAMINATION:  /67   Pulse 84   Temp (!) 39.2 °C (102.5 °F)   Resp 18   Ht 1.905 m (6' 3\")   Wt (!) 194 kg (427 lb 11.1 oz)   SpO2 97%   BMI 53.46 kg/m²   GENERAL:  Obese, well developed male in obvious destress  HEENT:  NC/AT, PERRL, EOMI, external ears and nose normal  NECK:  Supple, no obvious JVD, trachea midline  PULM:   CTAB   CVS:  RRR, no murmur  ABDOMEN:  Soft, obese, non-tender, non-distended  EXTREMITIES:  1+ b/l LE edema, no cyanosis  SKIN:  Warm, pink, dry  NEURO: AOx4, no focal deficits    LABORATORY DATA:    Lab Results   Component Value Date/Time    WBC 17.1 (H) 12/18/2017 01:26 PM    RBC 4.26 (L) 12/18/2017 01:26 PM    HEMOGLOBIN 12.9 (L) 12/18/2017 01:26 PM    HEMATOCRIT 38.5 (L) 12/18/2017 01:26 PM    MCV 90.4 12/18/2017 01:26 PM    MCH 30.3 12/18/2017 01:26 PM    MCHC 33.5 (L) 12/18/2017 01:26 PM    MPV 10.8 12/18/2017 01:26 PM    NEUTSPOLYS 81.40 (H) 12/18/2017 01:26 PM    LYMPHOCYTES 8.90 (L) 12/18/2017 01:26 PM    MONOCYTES 7.80 12/18/2017 01:26 PM    EOSINOPHILS 1.10 12/18/2017 01:26 PM    BASOPHILS 0.40 12/18/2017 01:26 PM      Lab Results   Component Value Date/Time    SODIUM 138 12/18/2017 01:26 PM    POTASSIUM 3.7 12/18/2017 01:26 PM    CHLORIDE 105 12/18/2017 01:26 PM    CO2 22 12/18/2017 01:26 PM    GLUCOSE 108 (H) 12/18/2017 01:26 PM    BUN 13 12/18/2017 01:26 PM    CREATININE 0.88 12/18/2017 01:26 PM    CREATININE 0.99 12/13/2010 12:00 PM    BUNCREATRAT 12 12/13/2010 12:00 PM    GLOMRATE >59 12/13/2010 12:00 PM      Lab Results   Component Value Date/Time    PROTHROMBTM 12.9 06/23/2017 12:55 AM    INR 0.94 06/23/2017 12:55 AM         IMAGING:   CXR (personally reviewed)  CT-PELVIS WITH   Final Result      Large RIGHT paramedian perineal subcutaneous fluid collection measuring 3.7 x 9.3 x 11.5 cm, " potentially indicating abscess, with surrounding inflammatory changes extending into the posterior scrotum.         ASSESSMENT/PLAN:  Sepsis   - skin and soft tissue infection of perineum   - not necrotizing per report   - Broad-spectrum coverage until cultures result then narrow   - has received adequate crystalloid bolus   - maintain MAP >65 mmHg and SBP >65 mmHg, pressors if needed   - f/u cultures   - lactate 1.7   - surgery on board, appreciate assistance    Perineal Abscess   - POD #0 from I&D   - broad spectrum coverage, surgical cultures pending   - pain control   - wound care    Wide complex tachycardia   - asymptomatic   - now converted to NSR with occasional PVC   - cardiology on board   - beta blocker   - optimize electrolytes   - will give amiodarone if returns    Leukocytosis   - secondary to abscess   - treatment as above   - monitor    Anemia   - mild   - continue to monitor   - restrictive transfusion strategy    DARIO   - nocturnal CPAP ordered    Prophylaxis: heparin    Patient is critically ill at this time.  I have spent 35 minutes examining this patient, all lab data, x-ray, and discussion with RN, RT, surgeon, pharmacist. Critical care time: 35 min. No time overlap. Procedures not included in time. Thank you for asking me to consult on the patient.  I appreciate the opportunity to assist in their care and will follow along closely with you.    Fidel Robins, DO  Critical Care Medicine    This dictation was created using voice recognition software. The accuracy of the dictation is limited to the abilities of the software. Errors of grammar and possibly content are to be expected.

## 2017-12-19 NOTE — PROGRESS NOTES
Records from [facility] cath report 4/2017 no evidence of significant disease. NSVT did occur during the angiogram. Records have been scanned into MEDIA in Epic.

## 2017-12-19 NOTE — PROGRESS NOTES
"Pulmonary Critical Care Progress Note      Date of Service: 12/19/2017    Chief Complaint: sepsis and wide complex tachycardia    History of Present Illness:  42 yom pmhx of HTN, DARIO (on home CPAP - unknown support) and gout presented to the [facility] with a complaint of 1 week of groin pain and abscess, a CT was obtained demonstrating: \"Large RIGHT paramedian perineal subcutaneous fluid collection measuring 3.7 x 9.3 x 11.5 cm, potentially indicating abscess, with surrounding inflammatory changes extending into the posterior scrotum\". His WBC was found to be 17.1 and his BP was noted to be marginally low and responded well to crystalloid resuscitation, while in the ER he was noted to have a wide complex tachycardia (per patient he has a hx of the same but a negative cardiac evaluation). The patient was taken to the OR and the abscess was I&Ded without any signs of necrotizing infection, intra-operatively the WCT returned and converted with IV lidocaine. I am consult post-operatively for critical care management of his sepsis and WCT. Cardiology was contacted intra-operatively and will consult        Interval Events:  24 hour interval history reviewed   Tm 102.5  +760cc over last 24hr  No cxr this am  NS w 20K 150  Cefepime/Flagyl  Wbc 17  Hb 10.9  i Ca 1.0  Mg 1.5    Review of Systems   Constitutional: Negative for chills and fever.   HENT: Negative for congestion.    Eyes: Negative for blurred vision.   Respiratory: Negative for sputum production.    Cardiovascular: Negative for chest pain and orthopnea.   Gastrointestinal: Negative for nausea and vomiting.   Genitourinary: Negative for dysuria.   Neurological: Negative for focal weakness.   All other systems reviewed and are negative.    Physical Exam   Constitutional: He appears well-developed and well-nourished.   HENT:   Head: Normocephalic and atraumatic.   Eyes: Pupils are equal, round, and reactive to light. No scleral icterus.   Neck: No tracheal deviation " present.   Cardiovascular: Normal rate.    Pulmonary/Chest: Effort normal. No respiratory distress.   Abdominal: Soft. He exhibits no distension. There is no tenderness.   Neurological: No cranial nerve deficit.   Skin: Skin is warm and dry.   Psychiatric: He has a normal mood and affect.   Nursing note and vitals reviewed.      PFSH:  No change.    Respiratory:     Pulse Oximetry: 93 %  Chest Tube Drains:          ImagingAvailable data reviewed         Invalid input(s): [id number]    HemoDynamics:  Pulse: 71, Heart Rate (Monitored): 70  Blood Pressure: 120/67, NIBP: 144/82         Imaging: Available data reviewed        Neuro:  GCS         Imaging: Available data reviewed    Fluids:  Intake/Output       12/17/17 0700 - 12/18/17 0659 (Not Admitted) 12/18/17 0700 - 12/19/17 0659 12/19/17 0700 - 12/20/17 0659      [id number] 0169-8919 Total 0182-3043 1732-1809 Total 1846-9941 4483-1791 Total       Intake    P.O.  --  -- --  --  560 560  640  -- 640    P.O. -- -- -- -- 560 560 640 -- 640    I.V.  --  -- --  --  1200 1200  --  -- --    Crystalloid Intake -- -- -- -- 600 600 -- -- --    IV Piggyback Volume -- -- -- -- 600 600 -- -- --    Total Intake -- -- -- -- 1760 1760 640 -- 640       Output    Urine  --  -- --  --  950 950  1150  -- 1150    Number of Times Voided -- -- -- -- 1 x 1 x 2 x -- 2 x    Void (ml) -- -- -- --  -- 1150    Blood  --  -- --  --  50 50  --  -- --    Est. Blood Loss (mL) -- -- -- -- 50 50 -- -- --    Total Output -- -- -- -- 1000 1000 1150 -- 1150       Net I/O     -- -- -- -- 760 760 -510 -- -510        Weight: (!) 183 kg (403 lb 7.1 oz)  Recent Labs      12/18/17   1326  12/19/17   0100   SODIUM  138  136   POTASSIUM  3.7  3.6   CHLORIDE  105  103   CO2  22  23   BUN  13  10   CREATININE  0.88  0.91   MAGNESIUM   --   1.5   PHOSPHORUS   --   4.2   CALCIUM  8.8  7.8*       GI/Nutrition:  Imaging: Available data reviewed  Liver Function  Recent Labs      12/18/17   1325   17   0100   GLUCOSE  108*  104*       Heme:  Recent Labs      17   1326  17   0425   RBC  4.26*  3.55*   HEMOGLOBIN  12.9*  10.9*   HEMATOCRIT  38.5*  33.4*   PLATELETCT  334  299       Infectious Disease:  Temp  Av.2 °C (98.9 °F)  Min: 35.9 °C (96.6 °F)  Max: 39.2 °C (102.5 °F)  Micro: cultures reviewed  Recent Labs      17   1326  17   0425   WBC  17.1*  17.3*   NEUTSPOLYS  81.40*   --    LYMPHOCYTES  8.90*   --    MONOCYTES  7.80   --    EOSINOPHILS  1.10   --    BASOPHILS  0.40   --      Current Facility-Administered Medications   Medication Dose Frequency Provider Last Rate Last Dose   • SODIUM CHLORIDE 0.9 % IV SOLN           • MD ALERT... vancomycin per pharmacy protocol   pharmacy to dose Fidel Robins Jr. D.O.       • cefepime (MAXIPIME) syringe 2 g  2 g Q8HRS Fidel Robins Jr., D.O.   2 g at 17 1418   • vancomycin 2,000 mg in  mL IVPB  2,000 mg Q12HR Fidel Robins Jr., D.O.   Stopped at 17 0512   • metronidazole (FLAGYL) tablet 500 mg  500 mg Q8HR Fidel Robins Jr., D.O.   500 mg at 17 1029   • furosemide (LASIX) tablet 40 mg  40 mg BID DIURETIC Christine Barry M.D.   40 mg at 17 0533   • carvedilol (COREG) tablet 6.25 mg  6.25 mg BID WITH MEALS Christine Barry M.D.   6.25 mg at 17 0533   • senna-docusate (PERICOLACE or SENOKOT S) 8.6-50 MG per tablet 2 Tab  2 Tab BID Christine Barry M.D.   2 Tab at 17 0858    And   • polyethylene glycol/lytes (MIRALAX) PACKET 1 Packet  1 Packet QDAY PRN Christine Barry M.D.        And   • magnesium hydroxide (MILK OF MAGNESIA) suspension 30 mL  30 mL QDAY PRN Christine Barry M.D.        And   • bisacodyl (DULCOLAX) suppository 10 mg  10 mg QDAY PRN Christine Barry M.D.       • 0.9 % NaCl with KCl 20 mEq infusion   Continuous Christine Barry M.D.   Stopped at 17 8686   • ondansetron (ZOFRAN) syringe/vial injection 4 mg  4 mg Q4HRS PRN Christine Barry M.D.       • ondansetron (ZOFRAN ODT) dispertab 4 mg  4  mg Q4HRS PRN Christine Barry M.D.       • promethazine (PHENERGAN) tablet 12.5-25 mg  12.5-25 mg Q4HRS PRN Christine Barry M.D.       • promethazine (PHENERGAN) suppository 12.5-25 mg  12.5-25 mg Q4HRS PRN Christine Barry M.D.       • prochlorperazine (COMPAZINE) injection 5-10 mg  5-10 mg Q4HRS PRN Christine Barry M.D.       • heparin injection 5,000 Units  5,000 Units Q8HRS Christine Barry M.D.   Stopped at 12/19/17 1400   • acetaminophen (TYLENOL) tablet 650 mg  650 mg Q6HRS PRN Christine Barry M.D.       • Pharmacy Consult Request ...Pain Management Review   PRN Christine Barry M.D.        And   • oxycodone immediate-release (ROXICODONE) tablet 2.5 mg  2.5 mg Q3HRS PRN Christine Barry M.D.   2.5 mg at 12/19/17 1419    And   • oxycodone immediate release (ROXICODONE) tablet 5 mg  5 mg Q3HRS PRN Christine Barry M.D.   5 mg at 12/19/17 1029    And   • HYDROmorphone (DILAUDID) injection 0.25 mg  0.25 mg Q3HRS PRN Christine Barry M.D.         Last reviewed on 12/18/2017  9:57 PM by Maude Sanchez, RALONSO    Quality  Measures:  Medications reviewed, Labs reviewed and Radiology images reviewed                      Assessment/Plan:  Sepsis   - perineal abscess, s/p I/D 12/18   - abx   - wound care   - f/u cultures   - maintain map >65      Perineal Abscess              - POD #1 from I&D              - broad spectrum coverage, surgical cultures pending              - pain control              - wound care     Wide complex tachycardia              - asymptomatic              - now converted to NSR with occasional PVC              - cardiology on board              - escalating beta blockers              - replace electrolytes prn    Hypocalcemia   - give 1 gram Ca    Hypomagnesemia   - give 2 gram Mg     Leukocytosis              - secondary to abscess              - treatment as above              - monitor     Anemia              - mild              - continue to monitor              - restrictive transfusion strategy     DARIO              -  nocturnal CPAP ordered    Discussed patient condition and risk of morbidity and/or mortality with RN, RT, Therapies, Pharmacy and Patient.

## 2017-12-19 NOTE — PROGRESS NOTES
Date & Time:   12/19/2017   10:33 AM        Patient ID:             Name:             Lionel Zacarias   YOB: 1975  Age:                 42 y.o.  male   MRN:               [id number]    ________________________________________________________________________      Patient reports less pressure and pain around rectal area  ambulating    Interval Exam:       Vitals:    12/19/17 0500 12/19/17 0600 12/19/17 0625 12/19/17 0700   BP:       Pulse: 69 72 69 75   Resp: (!) 22 (!) 23 (!) 30 (!) 21   Temp:       TempSrc:       SpO2: 88% 93% 97% 92%   Weight:  (!) 183 kg (403 lb 7.1 oz)     Height:         Weight/BMI: Body mass index is 50.43 kg/m².  Pulse Oximetry: 92 %, O2 (LPM): 2, O2 Delivery: Nasal Cannula    Intake/Output Summary (Last 24 hours) at 12/19/17 1033  Last data filed at 12/19/17 0600   Gross per 24 hour   Intake             1760 ml   Output             1000 ml   Net              760 ml       Physical Exam  GENERAL:  Alert and oriented x 3. NAD, normal respiratory effort  CV: Regular rate and rhythm, no murmurs. Extremities warm no edema.   Lungs: Clear to auscultation bilaterally.    Abd: Soft, Non-tender, non-distended. Normal bowel sounds.  Incision c/d/i without sign of infection.  Rectal:  Perineal wound with packing in place, serosanguinous drainage  Surrounding edema/inuration stable    Recent Labs      12/18/17   1326  12/19/17   0100   SODIUM  138  136   POTASSIUM  3.7  3.6   CHLORIDE  105  103   CO2  22  23   BUN  13  10   CREATININE  0.88  0.91   MAGNESIUM   --   1.5   PHOSPHORUS   --   4.2   CALCIUM  8.8  7.8*       Recent Labs      12/18/17   1326  12/19/17   0100   GLUCOSE  108*  104*       Recent Labs      12/18/17   1326  12/19/17   0425   RBC  4.26*  3.55*   HEMOGLOBIN  12.9*  10.9*   HEMATOCRIT  38.5*  33.4*   PLATELETCT  334  299       Recent Labs      12/18/17   1326  12/19/17   0425   WBC  17.1*  17.3*   NEUTSPOLYS  81.40*   --    LYMPHOCYTES  8.90*   --    MONOCYTES   7.80   --    EOSINOPHILS  1.10   --    BASOPHILS  0.40   --          ________________________________________________________________________     Current Assessment and Plan:      Continue packing; daily dressing changes  IV abx    Appreciate Cardiology recommendations   Will consult  to begin arranging outpatient wound care.

## 2017-12-19 NOTE — PROGRESS NOTES
"Pharmacy Kinetics 42 y.o. male on vancomycin day # 1 2017    Currently on Vancomycin 2000 mg iv q12hr    Indication for Treatment: perineal abscess    Pertinent history per medical record: Admitted on 2017 for worsening abscess to groin.  The patient reports that he previously had an abscess in the same spot requiring I&D by [facility] (3-4 years ago).  While in the ED the patient became febrile, hypotensive and hypotensive and taken directly to the OR for I&D. Per review of the surgical notes, there was no evidence of a necrotizing infection and identified a 10 cm abscess in the perineal and gluteal region with 150 mL of purulence.  Intraoperative cultures were collected.  Broad-spectrum antibiotics initiated.       Other antibiotics: Cefepime 2g IV q8h, metronidazole 500 mg po q8h    Allergies: Morphine and Pcn [penicillins]     List concerns for renal function : BMI = 50 kg/m2    Pertinent cultures to date:    - peripheral blood x2 - NGTD   - wound culture - in process    Recent Labs      17   1326  17   0425   WBC  17.1*  17.3*   NEUTSPOLYS  81.40*   --      Recent Labs      17   1326  17   0100   BUN  13  10   CREATININE  0.88  0.91     No results for input(s): VANCOTROUGH, VANCOPEAK, VANCORANDOM in the last 72 hours.  Intake/Output Summary (Last 24 hours) at 17 1154  Last data filed at 17 1100   Gross per 24 hour   Intake             2000 ml   Output             1500 ml   Net              500 ml      Blood pressure 120/67, pulse 69, temperature 36.6 °C (97.9 °F), resp. rate (!) 23, height 1.905 m (6' 3\"), weight (!) 183 kg (403 lb 7.1 oz), SpO2 98 %. Temp (24hrs), Av.2 °C (98.9 °F), Min:35.9 °C (96.6 °F), Max:39.2 °C (102.5 °F)      A/P   1. Vancomycin dose change: Continue current dose (x2 doses)  2. Next vancomycin level:  at 0830, ~18 hour level after last dose  3. Goal trough: 12-16 mcg/mL  4. Comments: Due to patient's BMI of 50 " kg/m2, q12 hr dosing likely most appropriate.  Renal function appears at baseline with only slight fluctuations.  Urine output remains appropriate per charted I/Os.  Cultures currently in process - purulent drainage noted in OR note from abscess.  Vancomycin level ordered for 12/20 at 0830 for assessment of appropriate clearance.  Pharmacy will continue to monitor.    Edda Morel, PharmD., BCPS  PGY-2 Critical Care Resident  [phone]

## 2017-12-19 NOTE — CARE PLAN
Problem: Infection  Goal: Will remain free from infection  Outcome: PROGRESSING AS EXPECTED    Intervention: Assess signs and symptoms of infection  Completed.   Intervention: Implement standard precautions and perform hand washing before and after patient contact  Hand hygiene performed before and after pt care is initiated.   Intervention: Assess for removal of potential routes of infection, such as IV, central line, intra-arterial or urinary catheters  All LDAs assessed and necessary for pt care.        Problem: Pain Management  Goal: Pain level will decrease to patient's comfort goal  Outcome: PROGRESSING AS EXPECTED  Pain having pain at surgical site. Pharmacological and non-pharmacological interventions in place.   Intervention: Follow pain managment plan developed in collaboration with patient and Interdisciplinary Team  Completed. Pharmacological and non-pharmacological interventions in place.   Intervention: Educate and implement non-pharmacologic comfort measures. Examples: relaxation, distration, play therapy, activity therapy, massage, etc.  Completed. Patient reminded to turn self in bed to decrease pressure on surgical site.

## 2017-12-19 NOTE — WOUND TEAM
"[facility]  Inpatient Services  Initial Wound and Skin Care Evaluation    Admission Date:  12/18/17     HPI, PMH, SH: Reviewed  Unit where seen by Wound Team:  [facility]     WOUND CONSULT RELATED TO:  Right buttocks open surgical      SUBJECTIVE:  \"Ya, this is the third time I've had this in the same place.\"           Self Report / Pain Level:  Pre-medicated, still pain with dressing change.                         OBJECTIVE:  Wet to dry dressing in place, SS drainage on dressing and bed linen.    WOUND TYPE, LOCATION, CHARACTERISTICS (Pressure ulcers: location, stage, POA or date identified)     Location and type of wound:  Right buttocks, open surgical          Periwound:  Induration, redness       Drainage:   Mod, SS    Tissue Type and %:  100% red of what can be visualized    Wound Edges:  Open    Odor:    None    Exposed structure(s): None   S&S of Infection:  Stable erythema, induration       Measurements:  (Taken 12/19/17)   Length:   4 cm    Width:    1 cm    Depth:    13.5 cm     INTERVENTIONS BY WOUND TEAM:  Patient pre-medicated, previous dressing removed, wound cleansed/irrigated with NS and gauze, repacked with roll gauze cut in half, moistened with NS.  Covered with ABD pad and secured with silk tape.        Interdisciplinary consultation:  RN, patient      EVALUATION:  Patient post op I&D of right buttock abscess.  Patient states he has had this site I&D x 3 now.          Wound is clean.  Patient states his daughter can assist with wound care if needed.  Talked about OP and/or follow up of some sort.  Dr. Barry wrote dressing order and is arranging for HH.        Factors affecting wound healing:  Re-current abscess, obesity, DARIO      Goals:  Decrease in wound size by 1% each week.     NURSING PLAN OF CARE ORDERS (X):    Dressing changes: See Dressing Maintenance orders: X  Skin care: See Skin Care orders:    Rectal tube care: See Rectal Tube Care orders:   Other orders:    RSKIN: CURRENT (X) " ORDERED (O)  Q shift Yosi:  X  Q shift pressure point assessments:  X  Pressure redistribution mattress        MANDY    X  Bariatric MANDY      Bariatric foam        Heel float boots       Heels floated on pillows    X  Barrier wipes      Barrier Cream      Barrier paste      Sacral silicone dressing    PRN  Silicone O2 tubing      Anchorfast      Trach with Optifoam split foam       Waffle cushion      Rectal tube or BMS      Antifungal tx    Turn q 2 hours   Ensure patient is turning   Up to chair     Ambulate   PT/OT     Dietician      PO   X  TF   TPN     PVN    NPO   # days   Other       WOUND TEAM PLAN OF CARE (X):   NPWT change 3 x week:        Dressing changes by wound team:       Follow up as needed:     X  Other (explain):    Anticipated discharge plans (X):  SNF:           Home Care:         X likely home care, patient lives in [place].     Outpatient Wound Center:            Self Care:            Other:

## 2017-12-19 NOTE — OR SURGEON
Immediate Post OP Note    PreOp Diagnosis: Perineal Abscess, septic shock    PostOp Diagnosis: Perineal Abscess, Septic shock    Procedure(s):  IRRIGATION & DEBRIDEMENT GENERAL - Wound Class: Dirty or Infected    Surgeon(s):  Christine Barry M.D.    Anesthesiologist/Type of Anesthesia:  Anesthesiologist: Germania Wagner M.D.  Anesthesia Technician: Diego Weems/General    Surgical Staff:  Circulator: Maude Sanchez R.N.  Limb [place]: Jose Gomez R.N.  Scrub Person: Matti Storm R.N.  Count [place]: Ellie Maza    Specimens:  cultures    Estimated Blood Loss: 20cc    Findings: 150cc purulent drainage     Complications: none, patient hemodynamically stable throughout the case            12/18/2017 10:17 PM Chrisitne Barry

## 2017-12-19 NOTE — CONSULTS
Reason of Consult: VT    Consulting Physician: Dr. Barry    HPI:  42M with severe DARIO, HTN and history of VT. Seen here 6M prior for incidental, asymptomatic runs of VT. Followed up with Dr. Galdamez [facility] who performed cardiac catheterization which was reportedly normal and normal LVEF. No AAD Rx. BB only. No syncope. Admitted today for perineal abscess and related sepsis. Noted preoperatively to have asymptomatic WCT in the 130-140 bpm rannge, similar in morphology to prior. Is now s/p surgical debridement of his abscess.     Denies any other cardiovascular symptoms including chest pain, shortness of breath, dyspnea on exertion, lightheadedness, syncope or presyncope, lower extremity edema, PND, orthopnea or palpitations.      Past Medical History:   Diagnosis Date   • Arthritis     joints   • At risk for sleep apnea    • GOUT 12/2/2010   • HTN (hypertension) 12/2/2010    2017 pt states fairly well controlled   • Hypertension    • DARIO (obstructive sleep apnea) 6/23/2017   • Snoring        Social History     Social History   • Marital status: Single     Spouse name: N/A   • Number of children: N/A   • Years of education: N/A     Occupational History   • Not on file.     Social History Main Topics   • Smoking status: Never Smoker   • Smokeless tobacco: Current User     Types: Chew   • Alcohol use 3.0 oz/week     6 Cans of beer per week      Comment: 2 per week   • Drug use: No   • Sexual activity: Not Currently     Other Topics Concern   • Not on file     Social History Narrative   • No narrative on file       No current facility-administered medications on file prior to encounter.      Current Outpatient Prescriptions on File Prior to Encounter   Medication Sig Dispense Refill   • carvedilol (COREG) 6.25 MG Tab Take 1 Tab by mouth 2 times a day, with meals. 60 Tab 4   • spironolactone (ALDACTONE) 25 MG Tab Take 1 Tab by mouth every day. 30 Tab 1   • allopurinol (ZYLOPRIM) 300 MG Tab Take 300 mg by mouth every day.     •  losartan (COZAAR) 100 MG Tab Take 100 mg by mouth every day.     • furosemide (LASIX) 40 MG Tab Take 40 mg by mouth 2 Times a Day.         Current Facility-Administered Medications   Medication Dose Frequency Provider Last Rate Last Dose   • MD ALERT... vancomycin per pharmacy protocol   pharmacy to dose Fidel Robins Jr. D.O.       • furosemide (LASIX) tablet 40 mg  40 mg BID DIURETIC Christine Barry M.D.   Stopped at 12/18/17 2245   • carvedilol (COREG) tablet 6.25 mg  6.25 mg BID WITH MEALS Christine Barry M.D.       • senna-docusate (PERICOLACE or SENOKOT S) 8.6-50 MG per tablet 2 Tab  2 Tab BID Christine Barry M.D.   2 Tab at 12/19/17 0057    And   • polyethylene glycol/lytes (MIRALAX) PACKET 1 Packet  1 Packet QDAY PRN Christine Barry M.D.        And   • magnesium hydroxide (MILK OF MAGNESIA) suspension 30 mL  30 mL QDAY PRN Christine Barry M.D.        And   • bisacodyl (DULCOLAX) suppository 10 mg  10 mg QDAY PRN Christine Barry M.D.       • 0.9 % NaCl with KCl 20 mEq infusion   Continuous Christine Barry M.D.   Stopped at 12/18/17 2245   • ondansetron (ZOFRAN) syringe/vial injection 4 mg  4 mg Q4HRS PRN Christine Barry M.D.       • ondansetron (ZOFRAN ODT) dispertab 4 mg  4 mg Q4HRS PRN Christine Barry M.D.       • promethazine (PHENERGAN) tablet 12.5-25 mg  12.5-25 mg Q4HRS PRN Chrisitne Barry M.D.       • promethazine (PHENERGAN) suppository 12.5-25 mg  12.5-25 mg Q4HRS PRN Christine Barry M.D.       • prochlorperazine (COMPAZINE) injection 5-10 mg  5-10 mg Q4HRS PRN Christine Barry M.D.       • heparin injection 5,000 Units  5,000 Units Q8HRS Christine Barry M.D.   5,000 Units at 12/19/17 0111   • acetaminophen (TYLENOL) tablet 650 mg  650 mg Q6HRS PRN Christine Barry M.D.       • Pharmacy Consult Request ...Pain Management Review   PRN Christine Barry M.D.        And   • oxycodone immediate-release (ROXICODONE) tablet 2.5 mg  2.5 mg Q3HRS PRN Christine Barry M.D.        And   • oxycodone immediate release (ROXICODONE) tablet 5 mg  5 mg Q3HRS PRN  "Christine Barry M.D.   5 mg at 12/19/17 0056    And   • HYDROmorphone (DILAUDID) injection 0.25 mg  0.25 mg Q3HRS PRN Christine Barry M.D.       • cefoTEtan (CEFOTAN) syringe 2 g  2 g Q12HRS Christine Barry M.D.   2 g at 12/19/17 0057     Last reviewed on 12/18/2017  9:57 PM by Maude Sanchez R.N.    Morphine and Pcn [penicillins]    Family History   Problem Relation Age of Onset   • Heart Disease Mother 55     MI   • Lung Disease Maternal Grandmother      COPD   • Heart Disease Maternal Grandfather      MI   • Sleep Apnea Brother        ROS: As per HPI all other systems reviewed and negative     Physical Exam   Blood pressure 120/67, pulse 79, temperature 37.2 °C (98.9 °F), resp. rate 20, height 1.905 m (6' 3\"), weight (!) 194 kg (427 lb 11.1 oz), SpO2 97 %.    Constitutional: Morbidly obese. Appears well-developed.   HENT: Normocephalic and atraumatic. No scleral icterus.   Neck: No JVD present.   Cardiovascular: Normal rate. Exam reveals no gallop and no friction rub. No murmur heard.   Pulmonary/Chest: CTAB    Abdominal: S/NT/ND BS+   Musculoskeletal:  Pulses present. No atrophy. Strength normal.  Extremities: Exhibits 1+ edema. No clubbing or cyanosis.   Skin: Skin is warm and dry.   Neuro: Non-focal, CN 2-12 intact grossly      Intake/Output Summary (Last 24 hours) at 12/19/17 0114  Last data filed at 12/18/17 2220   Gross per 24 hour   Intake              600 ml   Output               50 ml   Net              550 ml       Recent Labs      12/18/17   1326   WBC  17.1*   RBC  4.26*   HEMOGLOBIN  12.9*   HEMATOCRIT  38.5*   MCV  90.4   MCH  30.3   MCHC  33.5*   RDW  42.7   PLATELETCT  334   MPV  10.8     Recent Labs      12/18/17   1326   SODIUM  138   POTASSIUM  3.7   CHLORIDE  105   CO2  22   GLUCOSE  108*   BUN  13   CREATININE  0.88   CALCIUM  8.8                       EKG (12/18/20174):  I have personally reviewed the EKG this visit and discussed with the patient. It shows WCT with intermittent SR. No ST or T " abnormalities.    Imaging reviewed    Impressions:  1. Wide complex tachycardia, asymptomatic  2. Perineal Abscess s/p debriedment  3. Morbid obesity  4. DARIO on CPAP    Recommendations:  Monitoring. K>4, Mg>2  Continue BB  If sustained arrhythmia or symptomatic, IV amiodarone.   EP consultation in AM    Thank you for this interesting consultation. It was my pleasure to see Lionel Zacarias today.    Calos Zheng MD, FACC, [facility]  Division of Interventional Cardiology  [facility]

## 2017-12-19 NOTE — PROGRESS NOTES
Belongings taken to PACU coffee station, one pink bag, one cane and glasses are still missing from ER per the patient.

## 2017-12-19 NOTE — H&P
DATE OF SERVICE:  12/18/2017    REASON FOR CONSULTATION:  Perineal abscess.    HISTORY OF PRESENT ILLNESS:  Patient is a 42-year-old morbidly obese male with   history of perineal abscesses and I and D.  His last one was 3 years ago; it   was done as an office procedure.  This is his third recurrence.  He reports   1-week history of worsening pain and pressure in the perineal region and   severe pain with bowel movements.  CT abdomen and pelvis shows a 3.7x9.3x11.5   cm fluid collection in the perineal region to the right of midline with edema   tracking to the posterior scrotal area.  The patient does report subjective   fevers and chills.    PAST MEDICAL HISTORY:  Sleep apnea, arthritis, and hypertension.    PAST SURGICAL HISTORY:  Right hip surgery, ventral hernia repair in February 2017 with omentectomy and total hip replacement.    SOCIAL HISTORY:  Denies tobacco.  Occasional alcohol, denies illicit drugs.    MEDICATIONS:  Include allopurinol, carvedilol, doxycycline, Lasix, ibuprofen,   and spironolactone.    ALLERGIES:  MORPHINE, PENICILLIN.    PHYSICAL EXAMINATION:  VITAL SIGNS:  Temperature of 36.2, heart rate 95, blood pressure 160/105, and   saturations are 96%.  GENERAL:  Patient is alert and oriented x3, mild distress.  CARDIOVASCULAR:  Extremities are warm, no edema.  Regular rhythm.  CHEST:  Lungs clear.  ABDOMEN:  Soft, nondistended, nontender.  RECTAL:  Patient has an 8 cm area of fluctuance in the perineal area the   patient's right to midline approximately 6 cm from the anal verge, there is no   drainage.  There is some mild surrounding cellulitis, swelling, and   tenderness.  No crepitus is present.    LABORATORY DATA:  White count of 17, hemoglobin 12, hematocrit 38, platelets   334.  Sodium 138, potassium 3.7, chloride 105, bicarbonate 22, BUN 13,   creatinine 0.8.  Lactic acid of 1.7.    ASSESSMENT AND PLAN:  A 42-year-old male with perineal abscess, severe pain,   and leukocytosis.  Plan is  to take the patient to the OR for incision and   drainage and debridement of perineal abscess.  Risks of surgery were discussed   with patient including bleeding, postoperative worsening, infection,   recurrence, possible risk of chronic wound, perirectal fistula, and injury to   the sphincter mechanisms resulting in incontinence.  The patient stated he   understands the risks of surgery and wishes to proceed.       ____________________________________     MD RAN Curiel / ROBERTO    DD:  12/18/2017 19:13:38  DT:  12/18/2017 20:52:03    D#:  [id number]  Job#:  [id number]

## 2017-12-19 NOTE — PROGRESS NOTES
Cardiology Interval Note      HPI: 42-year-old male with a past medical history of morbid obesity, DARIO noncompliant with BiPAP, hypertension, gout presents to the ER with fever/hypotension severe sepsis with worsening on acute and chronic perineal abscess status post I&D and was found to have an episode of nonsustained V. tach in the ER as well as another episode intraoperatively and was given IV lidocaine. History of nonsustained V. tach in the past with negative cardiac catheterization normal left ventricle ejection fraction and no valvular abnormalities.    Interval Note:  - Non sustained Asymptomatic Vtach: Possibly secondary to Hypoxia in the setting of non compliant with BIPAP in the setting of severe DARIO/Obesity hypoventilation syndrome versus Others etiologies:2 episodes of Non sustained Vtach during presentation to the ER as well intraoperatively, previous history of non sustained Vtachy during sleep study. Negative cath with no evidence of significant coronary artery disease but NSVT was noted during angiogram and normal EF with no valvular abnormality.EP/Dr Zaragoza consulted, might consider switching Correg to Metoprolol as tolerated and other recommendations if any.    -Severe sepsis 2/2 to Acute on chronic 10 cm perineal abscess s/p I & D: 10 cm abscess drained with purluent fluid from perineal/right gluteal region.Elevated Leukocytosis 16>17 K with SIRS 4/4(severe sepsis on presentation). Continue with IV Vancomycin, Cefepime as well IV flagyl. Awaiting cultures and sensitivities. Blood culture so far negative.  -Possible Right sided Heart Failure in the setting of DARIO/Obesity hypoventilation syndrome-Echo showed mildly dilated R ventricle, with mild trace edema.Continue with Oral diuretics as tolerated.    -HTN- Well controlled on current regiment including oral diuretics/beta blockers(Home regiment consistent with Aldactone and Cozaar as well).        Patient Active Problem List    Diagnosis Date Noted    • Abscess 12/18/2017   • Morbid obesity (CMS-HCC) 06/24/2017   • DARIO (obstructive sleep apnea) 06/23/2017   • Polycythemia 06/23/2017   • Hypoxia 02/09/2017   • Other ventral hernia without mention of obstruction or gangrene 02/08/2017   • HTN (hypertension) 12/02/2010   • GOUT 12/02/2010       Current Facility-Administered Medications   Medication Dose Route Frequency Provider Last Rate Last Dose   • MD ALERT... vancomycin per pharmacy protocol   Other pharmacy to dose Fidel Robins Jr., D.O.       • cefepime (MAXIPIME) syringe 2 g  2 g Intravenous Q8HRS Fidel Robins Jr. D.O.   2 g at 12/19/17 0532   • vancomycin 2,000 mg in  mL IVPB  2,000 mg Intravenous Q12HR Fiedl Robins Jr. D.O.   Stopped at 12/19/17 0512   • metronidazole (FLAGYL) tablet 500 mg  500 mg Oral Q8HR Fidel Robins Jr., D.O.   500 mg at 12/19/17 1029   • metronidazole (FLAGYL) IVPB 500 mg  500 mg Intravenous Q8HRS Christine Barry M.D.       • furosemide (LASIX) tablet 40 mg  40 mg Oral BID DIURETIC Christine Barry M.D.   40 mg at 12/19/17 0533   • carvedilol (COREG) tablet 6.25 mg  6.25 mg Oral BID WITH MEALS Christine Barry M.D.   6.25 mg at 12/19/17 0533   • senna-docusate (PERICOLACE or SENOKOT S) 8.6-50 MG per tablet 2 Tab  2 Tab Oral BID Christine Barry M.D.   2 Tab at 12/19/17 0858    And   • polyethylene glycol/lytes (MIRALAX) PACKET 1 Packet  1 Packet Oral QDAY PRN Christine Barry M.D.        And   • magnesium hydroxide (MILK OF MAGNESIA) suspension 30 mL  30 mL Oral QDAY PRN Christine Barry M.D.        And   • bisacodyl (DULCOLAX) suppository 10 mg  10 mg Rectal QDAY PRN Christine Barry M.D.       • 0.9 % NaCl with KCl 20 mEq infusion   Intravenous Continuous Christine Barry M.D.   Stopped at 12/18/17 2884   • ondansetron (ZOFRAN) syringe/vial injection 4 mg  4 mg Intravenous Q4HRS PRN Christine Barry M.D.       • ondansetron (ZOFRAN ODT) dispertab 4 mg  4 mg Oral Q4HRS PRN Christine Barry M.D.       • promethazine (PHENERGAN) tablet 12.5-25 mg   "12.5-25 mg Oral Q4HRS PRN Christine Barry M.D.       • promethazine (PHENERGAN) suppository 12.5-25 mg  12.5-25 mg Rectal Q4HRS PRN Christine Barry M.D.       • prochlorperazine (COMPAZINE) injection 5-10 mg  5-10 mg Intravenous Q4HRS PRN Christine Barry M.D.       • heparin injection 5,000 Units  5,000 Units Subcutaneous Q8HRS Christine Barry M.D.   5,000 Units at 12/19/17 0533   • acetaminophen (TYLENOL) tablet 650 mg  650 mg Oral Q6HRS PRN Christine Barry M.D.       • Pharmacy Consult Request ...Pain Management Review   Other PRN Christine Barry M.D.        And   • oxycodone immediate-release (ROXICODONE) tablet 2.5 mg  2.5 mg Oral Q3HRS PRN Christine Barry M.D.        And   • oxycodone immediate release (ROXICODONE) tablet 5 mg  5 mg Oral Q3HRS PRN Christine Barry M.D.   5 mg at 12/19/17 1029    And   • HYDROmorphone (DILAUDID) injection 0.25 mg  0.25 mg Intravenous Q3HRS PRN Christine Barry M.D.           /67   Pulse 69   Temp 36.6 °C (97.9 °F)   Resp (!) 23   Ht 1.905 m (6' 3\")   Wt (!) 183 kg (403 lb 7.1 oz)   SpO2 98%   BMI 50.43 kg/m²     Physical Exam     Constitutional: Obese, Alert and oriented, No acute distress   HEENT: Normocephalic, Atraumatic, Bilateral external ears normal, Oropharynx moist mucous membranes, No oral exudates, Nose normal. No thyromegaly.   Eyes: PERRLA, EOMI, Conjunctiva normal, No discharge.   Neck: Normal range of motion, No stridor, no JVD.   Cardiovascular: Normal heart rate, Normal rhythm, No murmurs, No rubs, No gallops.   Lungs: Clear to auscultation bilaterally   Abdomen: Bowel sounds normal, Soft, , No guarding   Skin/: Wound in the perineal region with clean dressing.  Neurologic: Normal motor function, No focal deficits noted, cranial nerves II through XII are normal   Psychiatric: Affect normal, Judgment normal, Mood normal.   Extremities: B/L Peripheral Pulses +, trace pitting edema.      Assessment and Plan    1)Non sustained Asymptomatic Vtach: Possibly secondary to Hypoxia in " the setting of non compliant with BIPAP in the setting of severe DARIO/Obesity hypoventilation syndrome versus Others etiologies  2)Severe sepsis 2/2 to Acute on chronic 10 cm perineal abscess s/p I & D: 10 cm abscess drained with purluent fluid from perineal/right gluteal region.Elevated Leukocytosis 16>17 K with SIRS 4/4(severe sepsis on presentation).  3)Possible Right sided Heart Failure in the setting of DARIO/Obesity hypoventilation syndrome.  4)HTN    Recommendations:  -Awaiting EP doctor further recommendations as needed/appropriate.   -Need to reassess the need for heart failure/HTN medication as appropriate( currently on lasix 40 mg BID and Correg, at home on Aldactone and Cozaar).    Cardiology will continue to follow the patient.     Signed by: Virginia Sparks M.D.

## 2017-12-20 ENCOUNTER — PATIENT OUTREACH (OUTPATIENT)
Dept: HEALTH INFORMATION MANAGEMENT | Facility: OTHER | Age: 42
End: 2017-12-20

## 2017-12-20 VITALS
RESPIRATION RATE: 19 BRPM | SYSTOLIC BLOOD PRESSURE: 162 MMHG | HEART RATE: 77 BPM | BODY MASS INDEX: 39.17 KG/M2 | DIASTOLIC BLOOD PRESSURE: 103 MMHG | HEIGHT: 75 IN | OXYGEN SATURATION: 94 % | TEMPERATURE: 97.8 F | WEIGHT: 315 LBS

## 2017-12-20 LAB
ANION GAP SERPL CALC-SCNC: 10 MMOL/L (ref 0–11.9)
BACTERIA WND AEROBE CULT: ABNORMAL
BACTERIA WND AEROBE CULT: ABNORMAL
BASOPHILS # BLD AUTO: 0.4 % (ref 0–1.8)
BASOPHILS # BLD: 0.04 K/UL (ref 0–0.12)
BUN SERPL-MCNC: 8 MG/DL (ref 8–22)
CALCIUM SERPL-MCNC: 8.2 MG/DL (ref 8.5–10.5)
CHLORIDE SERPL-SCNC: 103 MMOL/L (ref 96–112)
CO2 SERPL-SCNC: 23 MMOL/L (ref 20–33)
CREAT SERPL-MCNC: 0.79 MG/DL (ref 0.5–1.4)
EKG IMPRESSION: NORMAL
EOSINOPHIL # BLD AUTO: 0.42 K/UL (ref 0–0.51)
EOSINOPHIL NFR BLD: 4.3 % (ref 0–6.9)
ERYTHROCYTE [DISTWIDTH] IN BLOOD BY AUTOMATED COUNT: 42.6 FL (ref 35.9–50)
GFR SERPL CREATININE-BSD FRML MDRD: >60 ML/MIN/1.73 M 2
GLUCOSE SERPL-MCNC: 99 MG/DL (ref 65–99)
GRAM STN SPEC: ABNORMAL
HCT VFR BLD AUTO: 35.8 % (ref 42–52)
HGB BLD-MCNC: 11.8 G/DL (ref 14–18)
IMM GRANULOCYTES # BLD AUTO: 0.04 K/UL (ref 0–0.11)
IMM GRANULOCYTES NFR BLD AUTO: 0.4 % (ref 0–0.9)
LYMPHOCYTES # BLD AUTO: 1.66 K/UL (ref 1–4.8)
LYMPHOCYTES NFR BLD: 17 % (ref 22–41)
MCH RBC QN AUTO: 30 PG (ref 27–33)
MCHC RBC AUTO-ENTMCNC: 33 G/DL (ref 33.7–35.3)
MCV RBC AUTO: 91.1 FL (ref 81.4–97.8)
MONOCYTES # BLD AUTO: 0.73 K/UL (ref 0–0.85)
MONOCYTES NFR BLD AUTO: 7.5 % (ref 0–13.4)
NEUTROPHILS # BLD AUTO: 6.85 K/UL (ref 1.82–7.42)
NEUTROPHILS NFR BLD: 70.4 % (ref 44–72)
NRBC # BLD AUTO: 0 K/UL
NRBC BLD-RTO: 0 /100 WBC
PLATELET # BLD AUTO: 337 K/UL (ref 164–446)
PMV BLD AUTO: 10.4 FL (ref 9–12.9)
POTASSIUM SERPL-SCNC: 3.9 MMOL/L (ref 3.6–5.5)
RBC # BLD AUTO: 3.93 M/UL (ref 4.7–6.1)
SIGNIFICANT IND 70042: ABNORMAL
SITE SITE: ABNORMAL
SODIUM SERPL-SCNC: 136 MMOL/L (ref 135–145)
SOURCE SOURCE: ABNORMAL
VANCOMYCIN SERPL-MCNC: 10 UG/ML
WBC # BLD AUTO: 9.7 K/UL (ref 4.8–10.8)

## 2017-12-20 PROCEDURE — A9270 NON-COVERED ITEM OR SERVICE: HCPCS | Performed by: INTERNAL MEDICINE

## 2017-12-20 PROCEDURE — 36415 COLL VENOUS BLD VENIPUNCTURE: CPT

## 2017-12-20 PROCEDURE — A9270 NON-COVERED ITEM OR SERVICE: HCPCS | Performed by: SURGERY

## 2017-12-20 PROCEDURE — 93005 ELECTROCARDIOGRAM TRACING: CPT | Performed by: NURSE PRACTITIONER

## 2017-12-20 PROCEDURE — 700111 HCHG RX REV CODE 636 W/ 250 OVERRIDE (IP): Performed by: SURGERY

## 2017-12-20 PROCEDURE — 93010 ELECTROCARDIOGRAM REPORT: CPT | Performed by: INTERNAL MEDICINE

## 2017-12-20 PROCEDURE — 80048 BASIC METABOLIC PNL TOTAL CA: CPT

## 2017-12-20 PROCEDURE — 80202 ASSAY OF VANCOMYCIN: CPT

## 2017-12-20 PROCEDURE — 85025 COMPLETE CBC W/AUTO DIFF WBC: CPT

## 2017-12-20 PROCEDURE — 700102 HCHG RX REV CODE 250 W/ 637 OVERRIDE(OP): Performed by: INTERNAL MEDICINE

## 2017-12-20 PROCEDURE — 97535 SELF CARE MNGMENT TRAINING: CPT

## 2017-12-20 PROCEDURE — 700102 HCHG RX REV CODE 250 W/ 637 OVERRIDE(OP): Performed by: SURGERY

## 2017-12-20 PROCEDURE — 700111 HCHG RX REV CODE 636 W/ 250 OVERRIDE (IP): Performed by: INTERNAL MEDICINE

## 2017-12-20 RX ORDER — FLECAINIDE ACETATE 100 MG/1
100 TABLET ORAL 2 TIMES DAILY
Qty: 60 TAB | Refills: 0 | Status: SHIPPED | OUTPATIENT
Start: 2017-12-20

## 2017-12-20 RX ORDER — FLECAINIDE ACETATE 100 MG/1
100 TABLET ORAL 2 TIMES DAILY
Qty: 60 TAB | Refills: 0 | Status: SHIPPED | OUTPATIENT
Start: 2017-12-20 | End: 2017-12-20

## 2017-12-20 RX ORDER — CEFDINIR 300 MG/1
300 CAPSULE ORAL 2 TIMES DAILY
Qty: 20 CAP | Refills: 0 | Status: SHIPPED | OUTPATIENT
Start: 2017-12-20 | End: 2017-12-25

## 2017-12-20 RX ADMIN — FUROSEMIDE 40 MG: 40 TABLET ORAL at 06:02

## 2017-12-20 RX ADMIN — OXYCODONE HYDROCHLORIDE 5 MG: 5 TABLET ORAL at 06:02

## 2017-12-20 RX ADMIN — METRONIDAZOLE 500 MG: 500 TABLET ORAL at 01:46

## 2017-12-20 RX ADMIN — CEFEPIME 2 G: 2 INJECTION, POWDER, FOR SOLUTION INTRAMUSCULAR; INTRAVENOUS at 13:14

## 2017-12-20 RX ADMIN — METOPROLOL TARTRATE 25 MG: 25 TABLET, FILM COATED ORAL at 10:07

## 2017-12-20 RX ADMIN — FLECAINIDE ACETATE 100 MG: 100 TABLET ORAL at 12:06

## 2017-12-20 RX ADMIN — METRONIDAZOLE 500 MG: 500 TABLET ORAL at 10:07

## 2017-12-20 RX ADMIN — CEFEPIME 2 G: 2 INJECTION, POWDER, FOR SOLUTION INTRAMUSCULAR; INTRAVENOUS at 05:59

## 2017-12-20 RX ADMIN — OXYCODONE HYDROCHLORIDE 5 MG: 5 TABLET ORAL at 13:14

## 2017-12-20 RX ADMIN — HEPARIN SODIUM 5000 UNITS: 5000 INJECTION, SOLUTION INTRAVENOUS; SUBCUTANEOUS at 06:02

## 2017-12-20 ASSESSMENT — ENCOUNTER SYMPTOMS
FOCAL WEAKNESS: 0
NERVOUS/ANXIOUS: 0
DIAPHORESIS: 0
ORTHOPNEA: 0
SPEECH CHANGE: 0
CLAUDICATION: 0
ABDOMINAL PAIN: 0
VOMITING: 0
FEVER: 0
INSOMNIA: 0
DIARRHEA: 0
WEAKNESS: 0
WHEEZING: 0
BLOOD IN STOOL: 0
PND: 0
PALPITATIONS: 0
SENSORY CHANGE: 0
COUGH: 0
NAUSEA: 0
BRUISES/BLEEDS EASILY: 0
CHILLS: 0
SHORTNESS OF BREATH: 0
DEPRESSION: 0
HEADACHES: 0
CONSTIPATION: 0
DIZZINESS: 0
HEMOPTYSIS: 0
SPUTUM PRODUCTION: 0

## 2017-12-20 ASSESSMENT — PAIN SCALES - GENERAL
PAINLEVEL_OUTOF10: 0
PAINLEVEL_OUTOF10: 8

## 2017-12-20 ASSESSMENT — LIFESTYLE VARIABLES: EVER_SMOKED: NEVER

## 2017-12-20 NOTE — PROGRESS NOTES
Pt refuses bed alarm. Two RN's educated pt on fall risk and benefits of alarm. Pt is a&ox4 and verbalizes understanding but continues to refuse. Pt is steady on feet. Bed locked and in lowest position. Non slip socks in place. Call light within reach. Hourly rounding in place.

## 2017-12-20 NOTE — CARE PLAN
Problem: Safety  Goal: Will remain free from injury  Outcome: PROGRESSING AS EXPECTED  Patient educated on safety interventions, bed brakes locked, alarm set    Problem: Pain Management  Goal: Pain level will decrease to patient's comfort goal  Outcome: PROGRESSING AS EXPECTED  Pain control adequate

## 2017-12-20 NOTE — CARE PLAN
Problem: Safety  Goal: Will remain free from injury  Outcome: PROGRESSING AS EXPECTED  Fall precautions in place. Bed wheels locked, bed is in the lowest position. Call light in reach. Non-skid socks provided. Patient provides successful verbalization of fall and safety precautions and demonstration of use of call bell. Upper side rails are up. Hourly rounding in progress.       Problem: Knowledge Deficit  Goal: Knowledge of disease process/condition, treatment plan, diagnostic tests, and medications will improve  Outcome: PROGRESSING AS EXPECTED  POC discussed with the patient. All questions and concerns addressed and answered. Patient is involved in POC and verbalizes the understanding of the disease process, medications, tests and treatments. Questions on POC encouraged throughout care.

## 2017-12-20 NOTE — PROGRESS NOTES
Two RN skin check. Pt has surgical wound to right buttocks with dressing in place. Dressing has old serosanguinous drainage to it. All other skin intact.

## 2017-12-20 NOTE — PROGRESS NOTES
Date & Time:   12/20/2017   11:37 AM        Patient ID:             Name:             Lionel Zacarias   YOB: 1975  Age:                 42 y.o.  male   MRN:               [id number]    ___________________________________    Patient reports he is feeling better  Home Health/wound care has not been set up and he does not qualify for home health  He is insistent on leaving today    Cultures +strep viridans     Interval Exam:       Vitals:    12/19/17 2000 12/20/17 0000 12/20/17 0400 12/20/17 0737   BP:  (!) 164/83 144/74 (!) 162/103   Pulse: 71 76 79 77   Resp: 15 18 18 19   Temp: (!) 35.7 °C (96.3 °F) 36.9 °C (98.5 °F) 36.6 °C (97.9 °F) 36.6 °C (97.8 °F)   TempSrc:       SpO2: 100% 94% 93% 94%   Weight:  (!) 197.8 kg (436 lb 1.1 oz)     Height:         Weight/BMI: Body mass index is 54.51 kg/m².  Pulse Oximetry: 94 %, O2 (LPM): 0, O2 Delivery: None (Room Air)    Intake/Output Summary (Last 24 hours) at 12/20/17 1137  Last data filed at 12/20/17 0000   Gross per 24 hour   Intake             1500 ml   Output             1900 ml   Net             -400 ml       Physical Exam  GENERAL:  Alert and oriented x 3. NAD, normal respiratory effort  CV: Regular rate and rhythm, no murmurs. Extremities warm no edema.   Lungs: Clear to auscultation bilaterally.    Abd: Soft, Non-tender, non-distended. Normal bowel sounds.  Incision c/d/i without sign of infection.  Perineal wound with packing in place  Serosanguinous drainage  Surrounding erythema/swelling decreased significantly    Recent Labs      12/18/17   1326  12/19/17   0100  12/20/17   0453   SODIUM  138  136  136   POTASSIUM  3.7  3.6  3.9   CHLORIDE  105  103  103   CO2  22  23  23   BUN  13  10  8   CREATININE  0.88  0.91  0.79   MAGNESIUM   --   1.5   --    PHOSPHORUS   --   4.2   --    CALCIUM  8.8  7.8*  8.2*       Recent Labs      12/18/17   1326  12/19/17   0100  12/20/17   0453   GLUCOSE  108*  104*  99       Recent Labs      12/18/17   1326   12/19/17   0425  12/20/17   0453   RBC  4.26*  3.55*  3.93*   HEMOGLOBIN  12.9*  10.9*  11.8*   HEMATOCRIT  38.5*  33.4*  35.8*   PLATELETCT  334  299  337       Recent Labs      12/18/17   1326  12/19/17   0425  12/20/17   0453   WBC  17.1*  17.3*  9.7   NEUTSPOLYS  81.40*   --   70.40   LYMPHOCYTES  8.90*   --   17.00*   MONOCYTES  7.80   --   7.50   EOSINOPHILS  1.10   --   4.30   BASOPHILS  0.40   --   0.40         ________________________________________________________________________     Current Assessment and Plan:      Will reach out to Cardiology to clarify changes to medication and prescriptions  Dressing change today  Patient instructed to remove packing tomorrow   Will arrange wound care at [facility] as outpatient  F/u in office in one week

## 2017-12-20 NOTE — PROGRESS NOTES
Cardiology Interval Note      HPI: 42-year-old male with a past medical history of morbid obesity, DARIO noncompliant with BiPAP, hypertension, gout presents to the ER with fever/hypotension severe sepsis with worsening on acute and chronic perineal abscess status post I&D and was found to have an episode of nonsustained V. tach in the ER as well as another episode intraoperatively and was given IV lidocaine. History of nonsustained V. tach in the past with negative cardiac catheterization normal left ventricle ejection fraction and no valvular abnormalities.    Interval Note:  - Non sustained Asymptomatic Vtach coming from the superolateral mitral annulus: Patient denies any chest pain, shortness of breath and/or palpitations. 6 beats of Non sustained Vatch overnight. Per EP/Dr Zaragoza,switched correg to Metropolol as well started Flecainide. Continue with Metoprolol and Flecainide as prescribed and patient would need to follow up with Dr Zaragoza with repeat EKG within a week.   -Resolved Severe sepsis 2/2 to Acute on chronic 10 cm perineal abscess s/p I & D: 10 cm abscess drained with purluent fluid from perineal/right gluteal region.Elevated Leukocytosis 16>17 K>9.7 with SIRS 4/4(severe sepsis on presentation). Continue with IV Vancomycin, Cefepime as well IV flagyl. Blood cultures remained negative with Wound culture positive for viridans Streptococcus. De-escalate the antibiotics based on sensitivities and cultures.   -Possible Right sided Heart Failure in the setting of DARIO/Obesity hypoventilation syndrome-Echo showed mildly dilated R ventricle, with mild trace edema.Continue with Metoprolol 25 mg BID,Oral Lasix 40 mg BID, Losartan 100 mg and consider stopping Aldactone.    -HTN- Continue with Metoprolol 25 mg BID, Lasix 40 mg BID, Losartan 100mg.       Patient Active Problem List    Diagnosis Date Noted   • Abscess 12/18/2017   • Morbid obesity (CMS-HCC) 06/24/2017   • DARIO (obstructive sleep apnea) 06/23/2017   •  Polycythemia 06/23/2017   • Hypoxia 02/09/2017   • Other ventral hernia without mention of obstruction or gangrene 02/08/2017   • HTN (hypertension) 12/02/2010   • GOUT 12/02/2010       Current Facility-Administered Medications   Medication Dose Route Frequency Provider Last Rate Last Dose   • vancomycin 2,400 mg in  mL IVPB  12 mg/kg Intravenous Q24HR Christine Barry M.D.   Stopped at 12/20/17 1115   • MD ALERT... vancomycin per pharmacy protocol   Other pharmacy to dose Fidel Robins Jr., D.O.       • cefepime (MAXIPIME) syringe 2 g  2 g Intravenous Q8HRS Fidel Robins Jr. D.O.   2 g at 12/20/17 1314   • metronidazole (FLAGYL) tablet 500 mg  500 mg Oral Q8HR Fidel Robins Jr., D.O.   500 mg at 12/20/17 1007   • flecainide (TAMBOCOR) tablet 100 mg  100 mg Oral TWICE DAILY Aristides Zaragoza M.D.   100 mg at 12/20/17 1206   • metoprolol (LOPRESSOR) tablet 25 mg  25 mg Oral TWICE DAILY Aristides Zaragoza M.D.   25 mg at 12/20/17 1007   • furosemide (LASIX) tablet 40 mg  40 mg Oral BID DIURETIC Christine Barry M.D.   40 mg at 12/20/17 0602   • senna-docusate (PERICOLACE or SENOKOT S) 8.6-50 MG per tablet 2 Tab  2 Tab Oral BID Christine Barry M.D.   2 Tab at 12/19/17 0858    And   • polyethylene glycol/lytes (MIRALAX) PACKET 1 Packet  1 Packet Oral QDAY PRN Christine Barry M.D.        And   • magnesium hydroxide (MILK OF MAGNESIA) suspension 30 mL  30 mL Oral QDAY PRN Christine Barry M.D.        And   • bisacodyl (DULCOLAX) suppository 10 mg  10 mg Rectal QDAY PRN Christine Barry M.D.       • 0.9 % NaCl with KCl 20 mEq infusion   Intravenous Continuous Christine Barry M.D.   Stopped at 12/18/17 3825   • ondansetron (ZOFRAN) syringe/vial injection 4 mg  4 mg Intravenous Q4HRS PRN Christine Barry M.D.       • ondansetron (ZOFRAN ODT) dispertab 4 mg  4 mg Oral Q4HRS PRN Christine Barry M.D.       • promethazine (PHENERGAN) tablet 12.5-25 mg  12.5-25 mg Oral Q4HRS PRN Christine Barry M.D.       • promethazine (PHENERGAN) suppository 12.5-25 mg   "12.5-25 mg Rectal Q4HRS PRN Christine Barry M.D.       • prochlorperazine (COMPAZINE) injection 5-10 mg  5-10 mg Intravenous Q4HRS PRN Christine Barry M.D.       • heparin injection 5,000 Units  5,000 Units Subcutaneous Q8HRS Christine Barry M.D.   Stopped at 12/20/17 1400   • acetaminophen (TYLENOL) tablet 650 mg  650 mg Oral Q6HRS PRN Christine Barry M.D.       • Pharmacy Consult Request ...Pain Management Review   Other PRN Christine Barry M.D.        And   • oxycodone immediate-release (ROXICODONE) tablet 2.5 mg  2.5 mg Oral Q3HRS PRN Christine Barry M.D.   2.5 mg at 12/19/17 1419    And   • oxycodone immediate release (ROXICODONE) tablet 5 mg  5 mg Oral Q3HRS PRN Christine Barry M.D.   5 mg at 12/20/17 1314    And   • HYDROmorphone (DILAUDID) injection 0.25 mg  0.25 mg Intravenous Q3HRS PRN Christine Barry M.D.           BP (!) 162/103 Comment: RN notified  Pulse 77   Temp 36.6 °C (97.8 °F)   Resp 19   Ht 1.905 m (6' 3\")   Wt (!) 197.8 kg (436 lb 1.1 oz)   SpO2 94%   BMI 54.51 kg/m²     Physical Exam     Constitutional: Obese, Alert and oriented, No acute distress   HEENT: Normocephalic, Atraumatic, Bilateral external ears normal, Oropharynx moist mucous membranes, No oral exudates, Nose normal. No thyromegaly.   Eyes: PERRLA, EOMI, Conjunctiva normal, No discharge.   Neck: Normal range of motion, No stridor, no JVD.   Cardiovascular: Normal heart rate, Normal rhythm, No murmurs, No rubs, No gallops.   Lungs: Clear to auscultation bilaterally   Abdomen: Bowel sounds normal, Soft, , No guarding   Skin/: Wound in the perineal region with clean dressing.  Neurologic: Normal motor function, No focal deficits noted, cranial nerves II through XII are normal   Psychiatric: Affect normal, Judgment normal, Mood normal.   Extremities: B/L Peripheral Pulses, trace edema in the LE.       Assessment and Plan    1)Non sustained Asymptomatic Vtach coming from the superolateral mitral annulus  2)Resolved Severe sepsis 2/2 to Acute on chronic " 10 cm perineal abscess s/p I & D.  3)Possible Right sided Heart Failure in the setting of DARIO/Obesity hypoventilation syndrome.  4)HTN    Recommendations:  -Continue with Flecainde 100 mg BID as prescribed and Metoprolol 25 mg BID. Follow up with Dr Zaragoza an outpatient within a week for repeat EKG(to assess QRS duration/OK interval)  -For HTN/possible Right sided heart failure, continue with Lasix 40 mg BID, Losartan 100 mg, Metoprolol 25mg BID and consider stopping spirnolactone.  -Encourage to uses Bipap at home for DARIO/obesity hypoventilation syndrome.    Cardiology will signs, appreciate the consult. Patient would need to follow up outpatient cardiology/Dr Zaragoza for repeat EKG within a week.        Signed by: Virginia Sparks M.D.

## 2017-12-20 NOTE — PROGRESS NOTES
Pt given discharge instructions.  Discussed diet, activity, follow up appointments, symptoms and management, and paper prescriptions given to pt.  Packet sent with patient.  IV d/c'd, tele box off, and all questions answered. Patient was discharged to home with friend. By wheelchair with transport.

## 2017-12-20 NOTE — THERAPY
PT orders received. Pt reports he has no acute PT needs at this time. Has been up to bathroom multiple times. Uses SPC as he recently under went L ACL reconstruction in October. Quick assesment on L LE strength and ROM in supine. provided pt with supine exercises and education to continue to progress L knee rehab while in acute setting. Nursing to mobilize as pt tolerates with SPC.  PT orders D/C'ed at this time, please reorder should pt status change.     Deborah Palacios, PT, DPT Pager: [phone]

## 2017-12-20 NOTE — DISCHARGE SUMMARY
ADMITTING DIAGNOSES:  1.  Perineal abscess and sepsis.  2.  Asymptomatic intermittent ventricular tachycardia.    DISCHARGE DIAGNOSES:  1.  Perineal abscess and sepsis.  2.  Asymptomatic intermittent ventricular tachycardia.    PROCEDURE:  Incision and drainage of perineal abscess.    SURGEON:  Dr. Christine Barry.    CARDIOLOGY CONSULTANT:  Calos Zheng MD.    HISTORY AND HOSPITAL COURSE:  Patient is a 42-year-old male, morbidly obese   with history of perineal perirectal abscesses.  He also has a history of   cardiac dysrhythmia and has had cardiac evaluation in the past.  He presented   to the emergency department with a 1-week history of severe perineal pain,   swelling and in the emergency room was showing signs of sepsis.  He also was   shown to have ventricular tachycardia on the monitor.  This was intermittent   and appeared to be asymptomatic.  Cardiology was consulted.  Patient was   treated for sepsis and taken to the operating room.  He underwent incision and   drainage of a very large perineal abscess.  He was admitted to the ICU for   observation.  Cardiology evaluated the patient and made changes to his   medication including starting on flecainide 100 mg b.i.d., metoprolol 25 mg   b.i.d., and change his Lasix to 40 mg b.i.d. and patient was instructed to   stop his Coreg medication, resume all other medications.  The patient is   postoperative day #2, he has been afebrile.  He is feeling much better.  The   white count went from 17-9.7.  On wound evaluation, there is significant   decrease in swelling and erythema around the wound.  The wound is draining   serosanguineous fluid with packing in place.  Attempts were made to arrange   home health for wound care.  The patient does not qualify for.  He lives in   [place].  He is insistent on leaving the hospital today if he has further   obligation.  The only thing would be keeping him at this point is establishing   wound care.  Plan is to continue the  packing through today.  He was   instructed to remove it tomorrow.  We will followup him.  He was instructed to   keep the wound as clean as possible to _____ drainage.  He is not able to sit   in a tub because of his body habitus, but he does have a handheld shower head   at home and instructed to irrigate the wound 3 times daily and after each   bowel movement, to follow up in the office in 1 week's time.  For pain   control, 600 mg ibuprofen q. 8 hours.    FOLLOWUP:  With cardiology and that APN notified me that he does have an   appointment.  He is given prescription for flecainide 100 mg b.i.d., Lasix 40   mg b.i.d., metoprolol 25 mg b.i.d., and Omnicef 300 mg b.i.d.  Patient has   been taking cefotetan and tolerating, his cultures came back positive for   Strep viridans.  The patient will follow up in my office in 1 weeks time.       ____________________________________     MD RAN Curiel / NTS    DD:  12/20/2017 12:17:06  DT:  12/20/2017 15:09:08    D#:  [id number]  Job#:  [id number]

## 2017-12-20 NOTE — CONSULTS
DATE OF CONSULTATION: 12/19/17    CONSULT REQUESTED BY: Dr. Johnson    REASON FOR CONSULTATION: ventricular tachycardia (vt)    HISTORY OF PRESENT ILLNESS:   Lionel Zacarias is a 42 y.o. male with a history of vt who presents with abscess on buttocks.    Pt presented to the er and had vt on presentation.  He has a long history of the vt.  No syncope in past and barely feels it but does sometimes get palpitations from it.  He thinks stress and being in pain can make the vt more likely to happen.  It does not bother him much.  He was worked up at [facility] with Dr. Galdamez but not tried much for treatment of the vt.    No particular timing that it is worse.  No triggers he knows of.    In workup, ef has remained normal.  He had angio and results now in media that show no epicardial cad.      He is morbidly obese and was told valentino treatment may help.  He has lost 60lb since starting cpap but still has the vt from time to time    ALLERGY:  Allergies   Allergen Reactions   • Morphine Unspecified     Nightmares terrors    • Pcn [Penicillins] Hives     RXN - YEARS AGO ; patient has taken Keflex before         MEDICATIONS:    Current Facility-Administered Medications:   •  MD ALERT... vancomycin per pharmacy protocol, , Other, pharmacy to dose, Fidel Robins Jr., D.O.  •  cefepime (MAXIPIME) syringe 2 g, 2 g, Intravenous, Q8HRS, Fidel Robins Jr., D.O., 2 g at 12/19/17 1418  •  metronidazole (FLAGYL) tablet 500 mg, 500 mg, Oral, Q8HR, Fidel Robins Jr., D.O., 500 mg at 12/19/17 1804  •  flecainide (TAMBOCOR) tablet 100 mg, 100 mg, Oral, TWICE DAILY, Aristides Zaragoza M.D., Stopped at 12/19/17 1745  •  metoprolol (LOPRESSOR) tablet 25 mg, 25 mg, Oral, TWICE DAILY, Aristides Zaragoza M.D., 25 mg at 12/19/17 1803  •  furosemide (LASIX) tablet 40 mg, 40 mg, Oral, BID DIURETIC, Christine Barry M.D., 40 mg at 12/19/17 1558  •  senna-docusate (PERICOLACE or SENOKOT S) 8.6-50 MG per tablet 2 Tab, 2 Tab, Oral, BID, 2 Tab at 12/19/17 1284  **AND** polyethylene glycol/lytes (MIRALAX) PACKET 1 Packet, 1 Packet, Oral, QDAY PRN **AND** magnesium hydroxide (MILK OF MAGNESIA) suspension 30 mL, 30 mL, Oral, QDAY PRN **AND** bisacodyl (DULCOLAX) suppository 10 mg, 10 mg, Rectal, QDAY PRN, Christine Barry M.D.  •  0.9 % NaCl with KCl 20 mEq infusion, , Intravenous, Continuous, Christine Barry M.D., Stopped at 12/18/17 2245  •  ondansetron (ZOFRAN) syringe/vial injection 4 mg, 4 mg, Intravenous, Q4HRS PRN, Christine Barry M.D.  •  ondansetron (ZOFRAN ODT) dispertab 4 mg, 4 mg, Oral, Q4HRS PRN, Christine Barry M.D.  •  promethazine (PHENERGAN) tablet 12.5-25 mg, 12.5-25 mg, Oral, Q4HRS PRN, Christine Barry M.D.  •  promethazine (PHENERGAN) suppository 12.5-25 mg, 12.5-25 mg, Rectal, Q4HRS PRN, Christine Barry M.D.  •  prochlorperazine (COMPAZINE) injection 5-10 mg, 5-10 mg, Intravenous, Q4HRS PRN, Christine Barry M.D.  •  heparin injection 5,000 Units, 5,000 Units, Subcutaneous, Q8HRS, Christine Barry M.D., Stopped at 12/19/17 1400  •  acetaminophen (TYLENOL) tablet 650 mg, 650 mg, Oral, Q6HRS PRN, Christine Barry M.D.  •  Notify provider if pain remains uncontrolled, , , CONTINUOUS **AND** Use the numeric rating scale (NRS-11) on regular floors and Critical-Care Pain Observation Tool (CPOT) on ICUs/Trauma to assess pain, , , CONTINUOUS **AND** Pulse Ox (Oximetry), , , CONTINUOUS **AND** Pharmacy Consult Request ...Pain Management Review, , Other, PRN **AND** If patient difficult to arouse and/or has respiratory depression, stop any opiates that are currently infusing and call a Rapid Response., , , CONTINUOUS **AND** oxycodone immediate-release (ROXICODONE) tablet 2.5 mg, 2.5 mg, Oral, Q3HRS PRN, 2.5 mg at 12/19/17 1419 **AND** oxycodone immediate release (ROXICODONE) tablet 5 mg, 5 mg, Oral, Q3HRS PRN, 5 mg at 12/19/17 1029 **AND** HYDROmorphone (DILAUDID) injection 0.25 mg, 0.25 mg, Intravenous, Q3HRS PRN, Christine Barry M.D.    PAST MEDICAL HISTORY:  Past Medical History:   Diagnosis  Date   • Arthritis     joints   • At risk for sleep apnea    • GOUT 12/2/2010   • HTN (hypertension) 12/2/2010    2017 pt states fairly well controlled   • Hypertension    • DARIO (obstructive sleep apnea) 6/23/2017   • Snoring      Past Surgical History:   Procedure Laterality Date   • IRRIGATION & DEBRIDEMENT GENERAL N/A 12/18/2017    Procedure: IRRIGATION & DEBRIDEMENT GENERAL;  Surgeon: Christine Barry M.D.;  [place] [facility]  Service: Gen Robotic   • VENTRAL HERNIA REPAIR N/A 2/8/2017    Procedure: VENTRAL HERNIA REPAIR W/MESH;  Surgeon: Daniel De Oliveira M.D.;  [place] [facility]  Service:    • OMENTECTOMY N/A 2/8/2017    Procedure: OMENTECTOMY;  Surgeon: Daniel De Oliveira M.D.;  [place] [facility] [place];  Service:    • HIP REPLACEMENT, TOTAL     • OTHER ORTHOPEDIC SURGERY      r hip         FAMILY HISTORY:  Family History   Problem Relation Age of Onset   • Heart Disease Mother 55     MI   • Lung Disease Maternal Grandmother      COPD   • Heart Disease Maternal Grandfather      MI   • Sleep Apnea Brother          SOCIAL HISTORY:  History   Smoking Status   • Never Smoker   Smokeless Tobacco   • Current User   • Types: Chew     History   Alcohol Use   • 3.0 oz/week   • 6 Cans of beer per week     Comment: 2 per week         REVIEW OF SYSTEMS:  Review of Systems   Cardiovascular: Positive for palpitations. Negative for chest pain.   Neurological: Negative for dizziness and loss of consciousness.   All other systems reviewed and are negative.      PHYSICAL EXAMINATION:  Patient Vitals for the past 24 hrs:   BP Temp Pulse Resp SpO2 Height Weight   12/19/17 1800 - - 74 (!) 32 95 % - -   12/19/17 1700 - - 69 (!) 22 96 % - -   12/19/17 1600 - 36.7 °C (98 °F) 70 14 98 % - -   12/19/17 1500 - - 71 17 93 % - -   12/19/17 1400 - - - (!) 29 96 % - -   12/19/17 1300 - - 67 18 98 % - -   12/19/17 1100 - - 69 (!) 23 98 % - -   12/19/17 0900 - - 66 (!) 23 96 % - -   12/19/17 0800 - 36.6 °C  "(97.9 °F) 68 19 90 % - -   12/19/17 0700 - - 75 (!) 21 92 % - -   12/19/17 0625 - - 69 (!) 30 97 % - -   12/19/17 0600 - - 72 (!) 23 93 % 1.905 m (6' 3\") (!) 183 kg (403 lb 7.1 oz)   12/19/17 0500 - - 69 (!) 22 88 % - -   12/19/17 0400 - 36.3 °C (97.3 °F) 76 (!) 22 95 % - -   12/19/17 0300 - - 76 (!) 21 94 % - -   12/19/17 0200 - - 80 (!) 24 94 % - -   12/19/17 0115 - - 75 20 - - -   12/19/17 0100 - - 84 (!) 21 92 % - -   12/19/17 0045 - - 78 17 - - -   12/19/17 0030 - - 77 (!) 29 - - -   12/19/17 0015 - - 78 (!) 25 - - -   12/19/17 0000 - 36 °C (96.8 °F) 79 20 97 % - -   12/18/17 2345 - 35.9 °C (96.6 °F) 80 15 97 % - -   12/18/17 2315 - - - 17 96 % - -   12/18/17 2300 - 37.2 °C (98.9 °F) - 17 98 % - -   12/18/17 2245 120/67 - 84 18 97 % - -   12/18/17 2228 140/69 (!) 39.2 °C (102.5 °F) 68 16 100 % - -   12/18/17 2103 - - - 20 - - -   12/18/17 2058 (!) 92/70 37.6 °C (99.7 °F) (!) 42 - 93 % - -   12/18/17 2023 (!) 85/50 (!) 38.5 °C (101.3 °F) (!) 101 (!) 24 94 % - -       Physical Exam   Constitutional: He is oriented to person, place, and time. He appears well-developed and well-nourished. No distress.   obese   HENT:   Head: Normocephalic and atraumatic.   Right Ear: External ear normal.   Left Ear: External ear normal.   Nose: Nose normal.   Mouth/Throat: Oropharynx is clear and moist.   Eyes: Conjunctivae and EOM are normal. Pupils are equal, round, and reactive to light. Right eye exhibits no discharge. Left eye exhibits no discharge. No scleral icterus.   Neck: Normal range of motion. Neck supple. No JVD present. No tracheal deviation present. No thyromegaly present.   Cardiovascular: Normal rate, regular rhythm, normal heart sounds and intact distal pulses.  Exam reveals no gallop and no friction rub.    No murmur heard.  Pulmonary/Chest: Effort normal and breath sounds normal. No stridor. No respiratory distress. He has no wheezes. He has no rales. He exhibits no tenderness.   Abdominal: Soft. Bowel sounds " are normal. He exhibits no distension. There is no tenderness. There is no rebound and no guarding.   Musculoskeletal: Normal range of motion. He exhibits edema.   Neurological: He is alert and oriented to person, place, and time. No cranial nerve deficit. Coordination normal.   Skin: Skin is warm and dry. No rash noted. He is not diaphoretic. No erythema.   Chronic stasis changes le   Psychiatric: He has a normal mood and affect. His behavior is normal. Judgment and thought content normal.   Vitals reviewed.        DATA:  Lab Results   Component Value Date/Time    SODIUM 136 12/19/2017 01:00 AM    POTASSIUM 3.6 12/19/2017 01:00 AM    CHLORIDE 103 12/19/2017 01:00 AM    CO2 23 12/19/2017 01:00 AM    GLUCOSE 104 (H) 12/19/2017 01:00 AM    BUN 10 12/19/2017 01:00 AM    CREATININE 0.91 12/19/2017 01:00 AM    CREATININE 0.99 12/13/2010 12:00 PM    BUNCREATRAT 12 12/13/2010 12:00 PM    GLOMRATE >59 12/13/2010 12:00 PM      Lab Results   Component Value Date/Time    PROTHROMBTM 12.9 06/23/2017 12:55 AM    INR 0.94 06/23/2017 12:55 AM      Lab Results   Component Value Date/Time    WBC 17.3 (H) 12/19/2017 04:25 AM    RBC 3.55 (L) 12/19/2017 04:25 AM    HEMOGLOBIN 10.9 (L) 12/19/2017 04:25 AM    HEMATOCRIT 33.4 (L) 12/19/2017 04:25 AM    MCV 94.1 12/19/2017 04:25 AM    MCH 30.7 12/19/2017 04:25 AM    MCHC 32.6 (L) 12/19/2017 04:25 AM    MPV 10.7 12/19/2017 04:25 AM    NEUTSPOLYS 81.40 (H) 12/18/2017 01:26 PM    LYMPHOCYTES 8.90 (L) 12/18/2017 01:26 PM    MONOCYTES 7.80 12/18/2017 01:26 PM    EOSINOPHILS 1.10 12/18/2017 01:26 PM    BASOPHILS 0.40 12/18/2017 01:26 PM        ECG personally evaluated and interpreted by me: he has wide complex tacycardia with inferior axis and positive concordance in precordial leads.      ECHO: ef normal  Other cardiac testing:cath with no cad    ASSESSMENT:  vt- appears to be normal heart vt likely coming from the superolateral mitral annulus  Some symptoms but not severe claude Sanchez  high dose beta blocker will limit his metaboism and activity and ablity to lose weight.        PLAN:  Will try flecainide.  Discussed it is off lable for normal heart vt but very safe and well tolerated with normal heart and no cad  Will change beta blocker to low dose metoprolol so he is not limited by the med

## 2017-12-20 NOTE — DISCHARGE INSTRUCTIONS
Discharge Instructions    Discharged to home by car with friend. Discharged via walking, hospital escort: yes.  Special equipment needed: Not Applicable    Be sure to schedule a follow-up appointment with your primary care doctor or any specialists as instructed.     Discharge Plan: D/c home   Remove Perineal Packing 12/21/17   Cleanse perineal wound three times daily and after each bowel movement     Plan to arrange wound care at [facility] as outpatient   Take over the counter stool softener as needed for constipation     Stop taking Coreg     F/u with Cardiology   F/u with Dr. Barry next week  Diet Plan: Discussed  Activity Level: Discussed  Confirmed Follow up Appointment: Appointment Scheduled  Confirmed Symptoms Management: Discussed  Medication Reconciliation Updated: Yes  Influenza Vaccine Indication: Patient Refuses    I understand that a diet low in cholesterol, fat, and sodium is recommended for good health. Unless I have been given specific instructions below for another diet, I accept this instruction as my diet prescription.   Other diet: regular    Special Instructions: None    · Is patient discharged on Warfarin / Coumadin?   No     · Is patient Post Blood Transfusion?  No    Depression / Suicide Risk    As you are discharged from this [facility], it is important to learn how to keep safe from harming yourself.    Recognize the warning signs:  · Abrupt changes in personality, positive or negative- including increase in energy   · Giving away possessions  · Change in eating patterns- significant weight changes-  positive or negative  · Change in sleeping patterns- unable to sleep or sleeping all the time   · Unwillingness or inability to communicate  · Depression  · Unusual sadness, discouragement and loneliness  · Talk of wanting to die  · Neglect of personal appearance   · Rebelliousness- reckless behavior  · Withdrawal from people/activities they love  · Confusion- inability to  concentrate     If you or a loved one observes any of these behaviors or has concerns about self-harm, here's what you can do:  · Talk about it- your feelings and reasons for harming yourself  · Remove any means that you might use to hurt yourself (examples: pills, rope, extension cords, firearm)  · Get professional help from the community (Mental Health, Substance Abuse, psychological counseling)  · Do not be alone:Call your Safe Contact- someone whom you trust who will be there for you.  · Call your local CRISIS HOTLINE 496-9929 or 114-685-6850  · Call your local Children's Mobile Crisis Response Team [place] (300) 528-6170 or www.[employer]  · Call the toll free National Suicide Prevention Hotlines   · National Suicide Prevention Lifeline 914-172-IDCX (4723)  · [employer] Com Line Network 800-SUICIDE (025-6665)      Perirectal Abscess  An abscess is an infected area that contains a collection of pus. A perirectal abscess is an abscess that is near the opening of the anus or around the rectum. A perirectal abscess can cause a lot of pain, especially during bowel movements.  CAUSES  This condition is almost always caused by an infection that starts in an anal gland.  RISK FACTORS  This condition is more likely to develop in:  · People with diabetes or inflammatory bowel disease.  · People whose body defense system (immune system) is weak.  · People who have anal sex.  · People who have a sexually transmitted disease (STD).  · People who have certain kinds of cancers, such as rectal carcinoma, leukemia, or lymphoma.  SYMPTOMS  The main symptom of this condition is pain. The pain may be a throbbing pain that gets worse during bowel movements. Other symptoms include:  · Fever.  · Swelling.  · Redness.  · Bleeding.  · Constipation.  DIAGNOSIS  The condition is diagnosed with a physical exam. If the abscess is not visible, a health care provider may need to place a finger inside the rectum to find the  "abscess. Sometimes, imaging tests are done to determine the size and location of the abscess. These tests may include:  · An ultrasound.  · An MRI.  · A CT scan.  TREATMENT  This condition is usually treated with incision and drainage surgery. Incision and drainage surgery involves making an incision over the abscess to drain the pus. Treatment may also involve antibiotic medicine, pain medicine, stool softeners, or laxatives.  HOME CARE INSTRUCTIONS  · Take medicines only as directed by your health care provider.  · If you were prescribed an antibiotic, finish all of it even if you start to feel better.  · To relieve pain, try sitting:  ¨ In a warm, shallow bath (sitz bath).  ¨ On a heating pad with the setting on low.  ¨ On an inflatable \"donut\" cushion.  · Follow any diet instructions as directed by your health care provider.  · Keep all follow-up visits as directed by your health care provider. This is important.  SEEK MEDICAL CARE IF:  · Your abscess is bleeding.  · You have pain, swelling, or redness that is getting worse.  · You are constipated.  · You feel ill.  · You have muscle aches or chills.  · You have a fever.  · Your symptoms return after the abscess has healed.     This information is not intended to replace advice given to you by your health care provider. Make sure you discuss any questions you have with your health care provider.     Document Released: 12/15/2001 Document Revised: 01/08/2016 Document Reviewed: 10/28/2015  [employer] Interactive Patient Education ©2016 [employer]        Hypertension  Hypertension, commonly called high blood pressure, is when the force of blood pumping through your arteries is too strong. Your arteries are the blood vessels that carry blood from your heart throughout your body. A blood pressure reading consists of a higher number over a lower number, such as 110/72. The higher number (systolic) is the pressure inside your arteries when your heart pumps. The lower " number (diastolic) is the pressure inside your arteries when your heart relaxes. Ideally you want your blood pressure below 120/80.  Hypertension forces your heart to work harder to pump blood. Your arteries may become narrow or stiff. Having untreated or uncontrolled hypertension can cause heart attack, stroke, kidney disease, and other problems.  RISK FACTORS  Some risk factors for high blood pressure are controllable. Others are not.   Risk factors you cannot control include:   · Race. You may be at higher risk if you are .  · Age. Risk increases with age.  · Gender. Men are at higher risk than women before age 45 years. After age 65, women are at higher risk than men.  Risk factors you can control include:  · Not getting enough exercise or physical activity.  · Being overweight.  · Getting too much fat, sugar, calories, or salt in your diet.  · Drinking too much alcohol.  SIGNS AND SYMPTOMS  Hypertension does not usually cause signs or symptoms. Extremely high blood pressure (hypertensive crisis) may cause headache, anxiety, shortness of breath, and nosebleed.  DIAGNOSIS  To check if you have hypertension, your health care provider will measure your blood pressure while you are seated, with your arm held at the level of your heart. It should be measured at least twice using the same arm. Certain conditions can cause a difference in blood pressure between your right and left arms. A blood pressure reading that is higher than normal on one occasion does not mean that you need treatment. If it is not clear whether you have high blood pressure, you may be asked to return on a different day to have your blood pressure checked again. Or, you may be asked to monitor your blood pressure at home for 1 or more weeks.  TREATMENT  Treating high blood pressure includes making lifestyle changes and possibly taking medicine. Living a healthy lifestyle can help lower high blood pressure. You may need to change  some of your habits.  Lifestyle changes may include:  · Following the DASH diet. This diet is high in fruits, vegetables, and whole grains. It is low in salt, red meat, and added sugars.  · Keep your sodium intake below 2,300 mg per day.  · Getting at least 30-45 minutes of aerobic exercise at least 4 times per week.  · Losing weight if necessary.  · Not smoking.  · Limiting alcoholic beverages.  · Learning ways to reduce stress.  Your health care provider may prescribe medicine if lifestyle changes are not enough to get your blood pressure under control, and if one of the following is true:  · You are 18-59 years of age and your systolic blood pressure is above 140.  · You are 60 years of age or older, and your systolic blood pressure is above 150.  · Your diastolic blood pressure is above 90.  · You have diabetes, and your systolic blood pressure is over 140 or your diastolic blood pressure is over 90.  · You have kidney disease and your blood pressure is above 140/90.  · You have heart disease and your blood pressure is above 140/90.  Your personal target blood pressure may vary depending on your medical conditions, your age, and other factors.  HOME CARE INSTRUCTIONS  · Have your blood pressure rechecked as directed by your health care provider.    · Take medicines only as directed by your health care provider. Follow the directions carefully. Blood pressure medicines must be taken as prescribed. The medicine does not work as well when you skip doses. Skipping doses also puts you at risk for problems.  · Do not smoke.    · Monitor your blood pressure at home as directed by your health care provider.   SEEK MEDICAL CARE IF:   · You think you are having a reaction to medicines taken.  · You have recurrent headaches or feel dizzy.  · You have swelling in your ankles.  · You have trouble with your vision.  SEEK IMMEDIATE MEDICAL CARE IF:  · You develop a severe headache or confusion.  · You have unusual weakness,  numbness, or feel faint.  · You have severe chest or abdominal pain.  · You vomit repeatedly.  · You have trouble breathing.  MAKE SURE YOU:   · Understand these instructions.  · Will watch your condition.  · Will get help right away if you are not doing well or get worse.     This information is not intended to replace advice given to you by your health care provider. Make sure you discuss any questions you have with your health care provider.     Document Released: 12/18/2006 Document Revised: 05/03/2016 Document Reviewed: 10/10/2014  [employer] Interactive Patient Education ©2016 [employer].      Metoprolol tablets  What is this medicine?  METOPROLOL (me TOE proe lole) is a beta-blocker. Beta-blockers reduce the workload on the heart and help it to beat more regularly. This medicine is used to treat high blood pressure and to prevent chest pain. It is also used to after a heart attack and to prevent an additional heart attack from occurring.  This medicine may be used for other purposes; ask your health care provider or pharmacist if you have questions.  COMMON BRAND NAME(S): Lopressor  What should I tell my health care provider before I take this medicine?  They need to know if you have any of these conditions:  -diabetes  -heart or vessel disease like slow heart rate, worsening heart failure, heart block, sick sinus syndrome or Raynaud's disease  -kidney disease  -liver disease  -lung or breathing disease, like asthma or emphysema  -pheochromocytoma  -thyroid disease  -an unusual or allergic reaction to metoprolol, other beta-blockers, medicines, foods, dyes, or preservatives  -pregnant or trying to get pregnant  -breast-feeding  How should I use this medicine?  Take this medicine by mouth with a drink of water. Follow the directions on the prescription label. Take this medicine immediately after meals. Take your doses at regular intervals. Do not take more medicine than directed. Do not stop taking this  medicine suddenly. This could lead to serious heart-related effects.  Talk to your pediatrician regarding the use of this medicine in children. Special care may be needed.  Overdosage: If you think you have taken too much of this medicine contact a poison control center or emergency room at once.  NOTE: This medicine is only for you. Do not share this medicine with others.  What if I miss a dose?  If you miss a dose, take it as soon as you can. If it is almost time for your next dose, take only that dose. Do not take double or extra doses.  What may interact with this medicine?  Do not take this medicine with any of the following medications:  -sotalol  This medicine may also interact with the following medications:  -clonidine  -digoxin  -dobutamine  -epinephrine  -isoproterenol  -medicine to control heart rhythm like quinidine, propafenone  -medicine for depression like monoamine oxidase (MAO) inhibitors, fluoxetine, and paroxetine  -medicine for high blood pressure like calcium channel blockers  -reserpine  This list may not describe all possible interactions. Give your health care provider a list of all the medicines, herbs, non-prescription drugs, or dietary supplements you use. Also tell them if you smoke, drink alcohol, or use illegal drugs. Some items may interact with your medicine.  What should I watch for while using this medicine?  Visit your doctor or health care professional for regular check ups. Contact your doctor right away if your symptoms worsen. Check your blood pressure and pulse rate regularly. Ask your health care professional what your blood pressure and pulse rate should be, and when you should contact them.  You may get drowsy or dizzy. Do not drive, use machinery, or do anything that needs mental alertness until you know how this medicine affects you. Do not sit or stand up quickly, especially if you are an older patient. This reduces the risk of dizzy or fainting spells. Contact your  doctor if these symptoms continue. Alcohol may interfere with the effect of this medicine. Avoid alcoholic drinks.  What side effects may I notice from receiving this medicine?  Side effects that you should report to your doctor or health care professional as soon as possible:  -allergic reactions like skin rash, itching or hives  -cold or numb hands or feet  -depression  -difficulty breathing  -faint  -fever with sore throat  -irregular heartbeat, chest pain  -rapid weight gain  -swollen legs or ankles  Side effects that usually do not require medical attention (report to your doctor or health care professional if they continue or are bothersome):  -anxiety or nervousness  -change in sex drive or performance  -dry skin  -headache  -nightmares or trouble sleeping  -short term memory loss  -stomach upset or diarrhea  -unusually tired  This list may not describe all possible side effects. Call your doctor for medical advice about side effects. You may report side effects to FDA at 9-554-FDA-2425.  Where should I keep my medicine?  Keep out of the reach of children.  Store at room temperature between 15 and 30 degrees C (59 and 86 degrees F). Throw away any unused medicine after the expiration date.  NOTE: This sheet is a summary. It may not cover all possible information. If you have questions about this medicine, talk to your doctor, pharmacist, or health care provider.  © 2014, Else[employer]. (2/27/2009 4:11:19 PM)          .Flecainide tablets  What is this medicine?  FLECAINIDE (FLEK a nide) is an antiarrhythmic drug. This medicine is used to prevent irregular heart rhythm. It can also slow down fast heartbeats called tachycardia.  This medicine may be used for other purposes; ask your health care provider or pharmacist if you have questions.  COMMON BRAND NAME(S): Tambocor  What should I tell my health care provider before I take this medicine?  They need to know if you have any of these  conditions:  -abnormal levels of potassium in the blood  -heart disease including heart rhythm and heart rate problems  -kidney or liver disease  -recent heart attack  -an unusual or allergic reaction to flecainide, local anesthetics, other medicines, foods, dyes, or preservatives  -pregnant or trying to get pregnant  -breast-feeding  How should I use this medicine?  Take this medicine by mouth with a glass of water. Follow the directions on the prescription label. You can take this medicine with or without food. Take your doses at regular intervals. Do not take your medicine more often than directed. Do not stop taking this medicine suddenly. This may cause serious, heart-related side effects. If your doctor wants you to stop the medicine, the dose may be slowly lowered over time to avoid any side effects.  Talk to your pediatrician regarding the use of this medicine in children. While this drug may be prescribed for children as young as 1 year of age for selected conditions, precautions do apply.  Overdosage: If you think you have taken too much of this medicine contact a poison control center or emergency room at once.  NOTE: This medicine is only for you. Do not share this medicine with others.  What if I miss a dose?  If you miss a dose, take it as soon as you can. If it is almost time for your next dose, take only that dose. Do not take double or extra doses.  What may interact with this medicine?  Do not take this medicine with any of the following medications:  -amoxapine  -arsenic trioxide  -certain antibiotics like clarithromycin, erythromycin, gatifloxacin, gemifloxacin, levofloxacin, moxifloxacin, sparfloxacin, or troleandomycin  -certain antidepressants called tricyclic antidepressants like amitriptyline, imipramine, or nortriptyline  -certain medicines to control heart rhythm like disopyramide, dofetilide, encainide, moricizine, procainamide, propafenone, and  quinidine  -cisapride  -cyclobenzaprine  -delavirdine  -droperidol  -haloperidol  -hawthorn  -imatinib  -levomethadyl  -maprotiline  -medicines for malaria like chloroquine and halofantrine  -pentamidine  -phenothiazines like chlorpromazine, mesoridazine, prochlorperazine, thioridazine  -pimozide  -quinine  -ranolazine  -ritonavir  -sertindole  -ziprasidone  This medicine may also interact with the following medications:  -cimetidine  -medicines for angina or high blood pressure  -medicines to control heart rhythm like amiodarone and digoxin  This list may not describe all possible interactions. Give your health care provider a list of all the medicines, herbs, non-prescription drugs, or dietary supplements you use. Also tell them if you smoke, drink alcohol, or use illegal drugs. Some items may interact with your medicine.  What should I watch for while using this medicine?  Visit your doctor or health care professional for regular checks on your progress. Because your condition and the use of this medicine carries some risk, it is a good idea to carry an identification card, necklace or bracelet with details of your condition, medications and doctor or health care professional.  Check your blood pressure and pulse rate regularly. Ask your health care professional what your blood pressure and pulse rate should be, and when you should contact him or her. Your doctor or health care professional also may schedule regular blood tests and electrocardiograms to check your progress.  You may get drowsy or dizzy. Do not drive, use machinery, or do anything that needs mental alertness until you know how this medicine affects you. Do not stand or sit up quickly, especially if you are an older patient. This reduces the risk of dizzy or fainting spells. Alcohol can make you more dizzy, increase flushing and rapid heartbeats. Avoid alcoholic drinks.  What side effects may I notice from receiving this medicine?  Side effects  that you should report to your doctor or health care professional as soon as possible:  -chest pain, continued irregular heartbeats  -difficulty breathing  -swelling of the legs or feet  -trembling, shaking  -unusually weak or tired  Side effects that usually do not require medical attention (report to your doctor or health care professional if they continue or are bothersome):  -blurred vision  -constipation  -headache  -nausea, vomiting  -stomach pain  This list may not describe all possible side effects. Call your doctor for medical advice about side effects. You may report side effects to FDA at 9-534-FDA-1268.  Where should I keep my medicine?  Keep out of the reach of children.  Store at room temperature between 15 and 30 degrees C (59 and 86 degrees F). Protect from light. Keep container tightly closed. Throw away any unused medicine after the expiration date.  NOTE: This sheet is a summary. It may not cover all possible information. If you have questions about this medicine, talk to your doctor, pharmacist, or health care provider.  © 2014, [employer]. (4/22/2009 4:46:09 PM)

## 2017-12-20 NOTE — PROGRESS NOTES
Assumed care of pt. Bedside report received from night R.N. Pt denies chest pain or SOB. VSS. Pt resting comfortably. All needs met. Bed low and locked, call light in reach. Discussed POC.

## 2017-12-20 NOTE — PROGRESS NOTES
Pt arrived to floor in bariatric bed with [facility] RN. Pt a&ox4. POC discussed. Pt denies any needs at this time. Pt placed on tele monitor and monitor room informed. Pt denies any needs at this time. Bed locked and in lowest position. Call light within reach. Hourly rounding in place.

## 2017-12-20 NOTE — PROGRESS NOTES
New medication ordered, flecainide. RN addressed new medication with pharmacy. Per pharmacy,Pt has prolonged QTC on ekg. Pharmacy suggested confirming with MD. RN paged cardiology. Discussed with Dr. Cowan. Per Dr. Cowan hold off for now.

## 2017-12-20 NOTE — DISCHARGE PLANNING
Transitional Care Navigator:    TCN met with patient to discuss transitional care services for discharge planning.  Home health level of care has been recommended for wound care.  TCN explained home health level of care in detail.  Pt in agreement but noted that he plans to go back to work ASAP. Pt's insurance may not cover home health if he's not home bound.  Choice is du.  Choice form completed and faxed to [facility]. SW aware.  TCN will follow-up as needed.

## 2017-12-21 LAB
BACTERIA SPEC ANAEROBE CULT: NORMAL
SIGNIFICANT IND 70042: NORMAL
SITE SITE: NORMAL
SOURCE SOURCE: NORMAL

## 2017-12-21 NOTE — PROGRESS NOTES
Cardiology Progress Note               Author: Inessa Crowley Date & Time created: 12/20/2017  8:32 PM     CC:  Perineal pain, WCT    Interval History:    Lionel Zacarias is a 42 y.o. male who was admitted for recurrent worsening perineal abscess & sepsis.  He has a documented PMH of DARIO (noncompliant with CPAP over the last few weeks), morbid obesity, VT, & HTN.  Was seen here 6 months ago for an incidental finding of WCT, rate 130-150's. Asymptomatic.  He has been followed by Dr. Galdamez at [facility] who performed cardiac catheterization, which was reportedly normal.  Echocardiogram done 6/9/17 showed an EF of 60%. Prior to admit his WCT was treated with coreg only, no AAD.  Mg on admit 1.5, K on the low end of normal at 3.6. Trops were negative.     The WCT occurred while he was in the ED & again during intra-operatively.  He was converted with IV lidocaine while in the OR. On review of tele, he has not had recurrence since then.  Has been in SR with occasional PVC's. Please see the dictated H&P by Dr. Zaragoza for further detail.     Patient seen on EPS rounds today.  No VT or WCT overnight.  -160's.  Surgery is planning on discharging him today with outpatient wound care.  Denies CP, SOB, palpitations, dizziness.  There was concern by nursing about a prolonged QT interval on his EKG, so they held his initial dose of flecanide. However, the EKG they were referencing was done while in a WCT on 12/18/17.  An updated EKG was done prior to discharge & had a normal QTc.    Review of Systems   Constitutional: Negative for chills, diaphoresis, fever and malaise/fatigue.   HENT: Negative for nosebleeds.    Respiratory: Negative for cough, hemoptysis, sputum production, shortness of breath and wheezing.    Cardiovascular: Negative for chest pain, palpitations, orthopnea, claudication, leg swelling and PND.   Gastrointestinal: Negative for abdominal pain, blood in stool, constipation, diarrhea, melena, nausea and  vomiting.   Genitourinary: Negative for dysuria, frequency, hematuria and urgency.   Neurological: Negative for dizziness, sensory change, speech change, focal weakness, weakness and headaches.   Endo/Heme/Allergies: Does not bruise/bleed easily.   Psychiatric/Behavioral: Negative for depression. The patient is not nervous/anxious and does not have insomnia.    All other systems reviewed and are negative.    Physical Exam   Constitutional: He is oriented to person, place, and time. He appears well-developed and well-nourished. No distress.   Obese   HENT:   Head: Normocephalic and atraumatic.   Eyes: EOM are normal. Pupils are equal, round, and reactive to light. Right eye exhibits no discharge. Left eye exhibits no discharge. No scleral icterus.   Neck: Normal range of motion. Neck supple. No JVD present.   Cardiovascular: Normal rate, regular rhythm, normal heart sounds and intact distal pulses.  Exam reveals no gallop and no friction rub.    No murmur heard.  Pulmonary/Chest: Effort normal and breath sounds normal. No stridor. No respiratory distress. He has no wheezes. He has no rales.   Abdominal: Soft. Bowel sounds are normal. He exhibits no distension. There is no tenderness. There is no rebound and no guarding.   Musculoskeletal: Normal range of motion. He exhibits no edema.   Neurological: He is alert and oriented to person, place, and time.   Skin: Skin is warm and dry. No rash noted. He is not diaphoretic. No erythema. No pallor.   Psychiatric: He has a normal mood and affect. His behavior is normal. Judgment and thought content normal.   Nursing note and vitals reviewed.    Hemodynamics:  Temp (24hrs), Av.7 °C (98.1 °F), Min:36.6 °C (97.8 °F), Max:36.9 °C (98.5 °F)  Temperature: 36.6 °C (97.8 °F)  Pulse  Av.7  Min: 42  Max: 101   Blood Pressure: (!) 162/103 (RN notified)       Respiratory:  Respiration: 19, Pulse Oximetry: 94 %  RUL Breath Sounds: Clear, RML Breath Sounds: Clear, RLL Breath  Sounds: Diminished, JEET Breath Sounds: Clear, LLL Breath Sounds: Diminished    Fluids:  Date 12/20/17 0700 - 12/21/17 0659(Discharged)   Shift 3756-1723 [id number] [id number] 24 Hour Total   I  N  T  A  K  E   P.O. 600   600    Shift Total 600   600   O  U  T  P  U  T   Shift Total       Weight (kg) 197.8 197.8 197.8 197.8   Weight: (!) 197.8 kg (436 lb 1.1 oz)    GI/Nutrition:  No orders of the defined types were placed in this encounter.    Lab Results:  Recent Labs      12/18/17   1326  12/19/17   0425  12/20/17   0453   WBC  17.1*  17.3*  9.7   RBC  4.26*  3.55*  3.93*   HEMOGLOBIN  12.9*  10.9*  11.8*   HEMATOCRIT  38.5*  33.4*  35.8*   MCV  90.4  94.1  91.1   MCH  30.3  30.7  30.0   MCHC  33.5*  32.6*  33.0*   RDW  42.7  45.1  42.6   PLATELETCT  334  299  337   MPV  10.8  10.7  10.4     Recent Labs      12/18/17   1326  12/19/17   0100  12/20/17   0453   SODIUM  138  136  136   POTASSIUM  3.7  3.6  3.9   CHLORIDE  105  103  103   CO2  22  23  23   GLUCOSE  108*  104*  99   BUN  13  10  8   CREATININE  0.88  0.91  0.79   CALCIUM  8.8  7.8*  8.2*     Medical Decision Making, by Problem:  Active Hospital Problems    Diagnosis   • Abscess [L02.91]     Plan:    Ok for discharge from EP standpoint.  Case discussed with surgeon Dr. Barry.  Discharge on flecanide 100 mg  Coreg changed to low metoprolol (see note by Dr. Zaragoza)  Follow up appt made in our office in 2 weeks      Reviewed items::  EKG reviewed, Medications reviewed and Labs reviewed  Giron catheter::  No Giron

## 2017-12-23 LAB
BACTERIA BLD CULT: NORMAL
BACTERIA BLD CULT: NORMAL
SIGNIFICANT IND 70042: NORMAL
SIGNIFICANT IND 70042: NORMAL
SITE SITE: NORMAL
SITE SITE: NORMAL
SOURCE SOURCE: NORMAL
SOURCE SOURCE: NORMAL

## 2018-01-08 ENCOUNTER — OFFICE VISIT (OUTPATIENT)
Dept: CARDIOLOGY | Facility: MEDICAL CENTER | Age: 43
End: 2018-01-08
Payer: COMMERCIAL

## 2018-01-08 VITALS
HEART RATE: 70 BPM | DIASTOLIC BLOOD PRESSURE: 94 MMHG | BODY MASS INDEX: 39.17 KG/M2 | HEIGHT: 75 IN | OXYGEN SATURATION: 93 % | WEIGHT: 315 LBS | SYSTOLIC BLOOD PRESSURE: 164 MMHG

## 2018-01-08 DIAGNOSIS — I47.20 VT (VENTRICULAR TACHYCARDIA) (HCC): ICD-10-CM

## 2018-01-08 DIAGNOSIS — I10 ESSENTIAL HYPERTENSION: ICD-10-CM

## 2018-01-08 DIAGNOSIS — G47.33 OSA (OBSTRUCTIVE SLEEP APNEA): ICD-10-CM

## 2018-01-08 DIAGNOSIS — Z51.81 ENCOUNTER FOR MONITORING FLECAINIDE THERAPY: ICD-10-CM

## 2018-01-08 DIAGNOSIS — Z79.899 ENCOUNTER FOR MONITORING FLECAINIDE THERAPY: ICD-10-CM

## 2018-01-08 DIAGNOSIS — E66.01 MORBID OBESITY (HCC): ICD-10-CM

## 2018-01-08 PROBLEM — R09.02 HYPOXIA: Status: RESOLVED | Noted: 2017-02-09 | Resolved: 2018-01-08

## 2018-01-08 LAB — EKG IMPRESSION: NORMAL

## 2018-01-08 PROCEDURE — 99214 OFFICE O/P EST MOD 30 MIN: CPT | Performed by: NURSE PRACTITIONER

## 2018-01-08 PROCEDURE — 93000 ELECTROCARDIOGRAM COMPLETE: CPT | Performed by: NURSE PRACTITIONER

## 2018-01-08 ASSESSMENT — ENCOUNTER SYMPTOMS
PND: 0
ORTHOPNEA: 0
FEVER: 0
SHORTNESS OF BREATH: 0
DIZZINESS: 0
MYALGIAS: 0
COUGH: 0
CLAUDICATION: 0
PALPITATIONS: 0
ABDOMINAL PAIN: 0

## 2018-01-08 NOTE — LETTER
[facility]   [address]  [place] [address]  Phone: [phone]  Fax: [phone]              Lionel Zacarias  1975    Encounter Date: 1/8/2018    ISA Acosta          PROGRESS NOTE:  Subjective:   Lionel Zacarias is a 42 y.o. male who presents today for hospital follow up for paroxysmal VT intra-operatively and during sleep study.    He is a patient of Dr. Zaragoza. Hx of morbid obesity, HTN, DARIO (now being treated), and gout.    He walks with a cane for back pain.    He has had no palpitations or chest pain. He feels good. He continues to lose weight.    He has had no episodes of chest pain, palpitations, dizziness/lightheadedness, shortness of breath, orthopnea, or peripheral edema.    Past Medical History:   Diagnosis Date   • Arthritis     joints   • GOUT 12/2/2010   • Hypertension    • Sleep apnea    • Ventricular tachycardia (CMS-HCC)      Past Surgical History:   Procedure Laterality Date   • IRRIGATION & DEBRIDEMENT GENERAL N/A 12/18/2017    Procedure: IRRIGATION & DEBRIDEMENT GENERAL;  Surgeon: Christine Barry M.D.;  Location: SURGERY [facility];  Service: Gen Robotic   • VENTRAL HERNIA REPAIR N/A 2/8/2017    Procedure: VENTRAL HERNIA REPAIR W/MESH;  Surgeon: Daniel De Oliveira M.D.;  Location: SURGERY [facility];  Service:    • OMENTECTOMY N/A 2/8/2017    Procedure: OMENTECTOMY;  Surgeon: Daniel De Oliveira M.D.;  Location: [facility];  Service:    • HIP REPLACEMENT, TOTAL     • OTHER ORTHOPEDIC SURGERY      r hip     Family History   Problem Relation Age of Onset   • Heart Disease Mother 55     MI   • Lung Disease Maternal Grandmother      COPD   • Heart Disease Maternal Grandfather      MI   • Sleep Apnea Brother      History   Smoking Status   • Never Smoker   Smokeless Tobacco   • Current User   • Types: Chew     Allergies   Allergen Reactions   • Morphine Unspecified     Nightmares terrors    •  "Pcn [Penicillins] Hives     RXN - YEARS AGO ; patient has taken Keflex before       Outpatient Encounter Prescriptions as of 1/8/2018   Medication Sig Dispense Refill   • flecainide (TAMBOCOR) 100 MG Tab Take 1 Tab by mouth 2 Times a Day. 60 Tab 0   • metoprolol (LOPRESSOR) 25 MG Tab Take 1 Tab by mouth 2 Times a Day. 60 Tab 0   • ibuprofen (MOTRIN) 200 MG Tab Take 600-800 mg by mouth every 8 hours as needed.     • spironolactone (ALDACTONE) 25 MG Tab Take 1 Tab by mouth every day. 30 Tab 1   • allopurinol (ZYLOPRIM) 300 MG Tab Take 300 mg by mouth every day.     • losartan (COZAAR) 100 MG Tab Take 100 mg by mouth every day.     • furosemide (LASIX) 40 MG Tab Take 40 mg by mouth 2 Times a Day.       No facility-administered encounter medications on file as of 1/8/2018.      Review of Systems   Constitutional: Negative for fever and malaise/fatigue.   Respiratory: Negative for cough and shortness of breath.    Cardiovascular: Negative for chest pain, palpitations, orthopnea, claudication, leg swelling and PND.   Gastrointestinal: Negative for abdominal pain.   Musculoskeletal: Positive for joint pain. Negative for myalgias.   Neurological: Negative for dizziness.   All other systems reviewed and are negative.       Objective:   BP (!) 164/94   Pulse 70   Ht 1.905 m (6' 3\")   Wt (!) 198.7 kg (438 lb)   SpO2 93%   BMI 54.75 kg/m²      Physical Exam   Constitutional: He is oriented to person, place, and time. He appears well-developed. No distress.   obese   HENT:   Head: Normocephalic and atraumatic.   Eyes: EOM are normal.   Neck: Normal range of motion. No JVD present.   Cardiovascular: Normal rate, regular rhythm, normal heart sounds and intact distal pulses.    No murmur heard.  Pulmonary/Chest: Effort normal and breath sounds normal. No respiratory distress. He has no wheezes. He has no rales.   Abdominal: Soft. Bowel sounds are normal.   Musculoskeletal: Normal range of motion. He exhibits no edema.   "   Neurological: He is alert and oriented to person, place, and time.   Skin: Skin is warm and dry.   Psychiatric: He has a normal mood and affect.   Nursing note and vitals reviewed.      Assessment:     1. Essential hypertension     2. Morbid obesity (CMS-HCC)     3. DARIO (obstructive sleep apnea)     4. Encounter for monitoring flecainide therapy  EKG   5. VT (ventricular tachycardia) (CMS-HCC)       Medical Decision Making:  Today's Assessment / Status / Plan:     1. HTN, moderate. 120's at home. Follow home BP. Call for SBP >140 consistently. Continue lasix, aldactone, losartan, and metoprolol for now.    2. Obesity, working on diet/exercise and losing weight already. Continue. Consider apt with [facility].    3. DARIO, treating with CPAP now. Highly recommended.    4. Paroxysmal VT, QTC stable on EKG today. SR. Cont flecainide and metoprolol same dosing until EP apt. Follow up in a few weeks. No CAD on cath, Echo WNL. Mag and CMP WNL in hospital.    FU in clinic in 1 month with JE with EKG    Patient verbalizes understanding and agrees with the plan of care.     Collaborating MD: Blanca RIVERA        No Recipients

## 2018-01-08 NOTE — PROGRESS NOTES
Subjective:   Lionel Zacarias is a 42 y.o. male who presents today for hospital follow up for paroxysmal VT intra-operatively and during sleep study.    He is a patient of Dr. Zaragoza. Hx of morbid obesity, HTN, DARIO (now being treated), and gout.    He walks with a cane for back pain.    He has had no palpitations or chest pain. He feels good. He continues to lose weight.    He has had no episodes of chest pain, palpitations, dizziness/lightheadedness, shortness of breath, orthopnea, or peripheral edema.    Past Medical History:   Diagnosis Date   • Arthritis     joints   • GOUT 12/2/2010   • Hypertension    • Sleep apnea    • Ventricular tachycardia (CMS-HCC)      Past Surgical History:   Procedure Laterality Date   • IRRIGATION & DEBRIDEMENT GENERAL N/A 12/18/2017    Procedure: IRRIGATION & DEBRIDEMENT GENERAL;  Surgeon: Christine Barry M.D.;  Location: SURGERY [facility];  Service: Gen Robotic   • VENTRAL HERNIA REPAIR N/A 2/8/2017    Procedure: VENTRAL HERNIA REPAIR W/MESH;  Surgeon: Daniel De Oliveira M.D.;  Location: SURGERY [facility];  Service:    • OMENTECTOMY N/A 2/8/2017    Procedure: OMENTECTOMY;  Surgeon: Daniel De Oliveira M.D.;  Location: SURGERY [facility];  Service:    • HIP REPLACEMENT, TOTAL     • OTHER ORTHOPEDIC SURGERY      r hip     Family History   Problem Relation Age of Onset   • Heart Disease Mother 55     MI   • Lung Disease Maternal Grandmother      COPD   • Heart Disease Maternal Grandfather      MI   • Sleep Apnea Brother      History   Smoking Status   • Never Smoker   Smokeless Tobacco   • Current User   • Types: Chew     Allergies   Allergen Reactions   • Morphine Unspecified     Nightmares terrors    • Pcn [Penicillins] Hives     RXN - YEARS AGO ; patient has taken Keflex before       Outpatient Encounter Prescriptions as of 1/8/2018   Medication Sig Dispense Refill   • flecainide (TAMBOCOR) 100 MG Tab Take 1 Tab by mouth 2 Times a Day. 60 Tab 0   • metoprolol (LOPRESSOR) 25  "MG Tab Take 1 Tab by mouth 2 Times a Day. 60 Tab 0   • ibuprofen (MOTRIN) 200 MG Tab Take 600-800 mg by mouth every 8 hours as needed.     • spironolactone (ALDACTONE) 25 MG Tab Take 1 Tab by mouth every day. 30 Tab 1   • allopurinol (ZYLOPRIM) 300 MG Tab Take 300 mg by mouth every day.     • losartan (COZAAR) 100 MG Tab Take 100 mg by mouth every day.     • furosemide (LASIX) 40 MG Tab Take 40 mg by mouth 2 Times a Day.       No facility-administered encounter medications on file as of 1/8/2018.      Review of Systems   Constitutional: Negative for fever and malaise/fatigue.   Respiratory: Negative for cough and shortness of breath.    Cardiovascular: Negative for chest pain, palpitations, orthopnea, claudication, leg swelling and PND.   Gastrointestinal: Negative for abdominal pain.   Musculoskeletal: Positive for joint pain. Negative for myalgias.   Neurological: Negative for dizziness.   All other systems reviewed and are negative.       Objective:   BP (!) 164/94   Pulse 70   Ht 1.905 m (6' 3\")   Wt (!) 198.7 kg (438 lb)   SpO2 93%   BMI 54.75 kg/m²     Physical Exam   Constitutional: He is oriented to person, place, and time. He appears well-developed. No distress.   obese   HENT:   Head: Normocephalic and atraumatic.   Eyes: EOM are normal.   Neck: Normal range of motion. No JVD present.   Cardiovascular: Normal rate, regular rhythm, normal heart sounds and intact distal pulses.    No murmur heard.  Pulmonary/Chest: Effort normal and breath sounds normal. No respiratory distress. He has no wheezes. He has no rales.   Abdominal: Soft. Bowel sounds are normal.   Musculoskeletal: Normal range of motion. He exhibits no edema.   Neurological: He is alert and oriented to person, place, and time.   Skin: Skin is warm and dry.   Psychiatric: He has a normal mood and affect.   Nursing note and vitals reviewed.      Assessment:     1. Essential hypertension     2. Morbid obesity (CMS-HCC)     3. DARIO (obstructive " sleep apnea)     4. Encounter for monitoring flecainide therapy  EKG   5. VT (ventricular tachycardia) (CMS-[facility])       Medical Decision Making:  Today's Assessment / Status / Plan:     1. HTN, moderate. 120's at home. Follow home BP. Call for SBP >140 consistently. Continue lasix, aldactone, losartan, and metoprolol for now.    2. Obesity, working on diet/exercise and losing weight already. Continue. Consider apt with [facility].    3. DARIO, treating with CPAP now. Highly recommended.    4. Paroxysmal VT, QTC stable on EKG today. SR. Cont flecainide and metoprolol same dosing until EP apt. Follow up in a few weeks. No CAD on cath, Echo WNL. Mag and CMP WNL in hospital.    FU in clinic in 1 month with JE with EKG    Patient verbalizes understanding and agrees with the plan of care.     Collaborating MD: Blanca RIVERA

## 2018-04-27 NOTE — PROGRESS NOTES
Patient in NSR 0.16/0.1/0.42    Patient wanting to leave AMA. Patient educated on need for wound care and antibiotic therapy per RN   normal...

## 2018-04-30 ENCOUNTER — HOSPITAL ENCOUNTER (OUTPATIENT)
Dept: LAB | Facility: MEDICAL CENTER | Age: 43
End: 2018-04-30
Attending: INTERNAL MEDICINE
Payer: COMMERCIAL

## 2018-04-30 LAB
ALBUMIN SERPL BCP-MCNC: 4.2 G/DL (ref 3.2–4.9)
ALBUMIN/GLOB SERPL: 1.4 G/DL
ALP SERPL-CCNC: 80 U/L (ref 30–99)
ALT SERPL-CCNC: 16 U/L (ref 2–50)
ANION GAP SERPL CALC-SCNC: 9 MMOL/L (ref 0–11.9)
AST SERPL-CCNC: 13 U/L (ref 12–45)
BILIRUB SERPL-MCNC: 0.5 MG/DL (ref 0.1–1.5)
BUN SERPL-MCNC: 28 MG/DL (ref 8–22)
CALCIUM SERPL-MCNC: 9.1 MG/DL (ref 8.5–10.5)
CHLORIDE SERPL-SCNC: 109 MMOL/L (ref 96–112)
CHOLEST SERPL-MCNC: 170 MG/DL (ref 100–199)
CO2 SERPL-SCNC: 23 MMOL/L (ref 20–33)
CREAT SERPL-MCNC: 0.86 MG/DL (ref 0.5–1.4)
GLOBULIN SER CALC-MCNC: 2.9 G/DL (ref 1.9–3.5)
GLUCOSE SERPL-MCNC: 132 MG/DL (ref 65–99)
HDLC SERPL-MCNC: 37 MG/DL
LDLC SERPL CALC-MCNC: 103 MG/DL
POTASSIUM SERPL-SCNC: 4.3 MMOL/L (ref 3.6–5.5)
PROT SERPL-MCNC: 7.1 G/DL (ref 6–8.2)
SODIUM SERPL-SCNC: 141 MMOL/L (ref 135–145)
TRIGL SERPL-MCNC: 151 MG/DL (ref 0–149)
TSH SERPL DL<=0.005 MIU/L-ACNC: 3.86 UIU/ML (ref 0.38–5.33)

## 2018-04-30 PROCEDURE — 36415 COLL VENOUS BLD VENIPUNCTURE: CPT

## 2018-04-30 PROCEDURE — 80061 LIPID PANEL: CPT

## 2018-04-30 PROCEDURE — 84443 ASSAY THYROID STIM HORMONE: CPT

## 2018-04-30 PROCEDURE — 80053 COMPREHEN METABOLIC PANEL: CPT

## 2019-08-11 NOTE — PROGRESS NOTES
1830 Patient admitted to T429.    A&Ox4 family at bedside, no acute distress.  C/O pain-medicated.  92% O2 Sat on 5L O2 oxy mask. Provided water and ordered dinner tray.  No other requests at this time.  Call light provided, instructed to call prior to getting out of bed, oriented to staff and unit.  Hourly rounding and white board explained.  Fall precautions in place.   nonrebreather mask

## 2019-08-16 ENCOUNTER — OFFICE VISIT (OUTPATIENT)
Dept: URGENT CARE | Facility: PHYSICIAN GROUP | Age: 44
End: 2019-08-16
Payer: COMMERCIAL

## 2019-08-16 VITALS
DIASTOLIC BLOOD PRESSURE: 92 MMHG | RESPIRATION RATE: 16 BRPM | HEART RATE: 68 BPM | HEIGHT: 75 IN | TEMPERATURE: 97.3 F | BODY MASS INDEX: 39.17 KG/M2 | SYSTOLIC BLOOD PRESSURE: 130 MMHG | WEIGHT: 315 LBS | OXYGEN SATURATION: 94 %

## 2019-08-16 DIAGNOSIS — S99.921A INJURY OF RIGHT FOOT, INITIAL ENCOUNTER: ICD-10-CM

## 2019-08-16 DIAGNOSIS — E66.01 MORBID OBESITY WITH BMI OF 50.0-59.9, ADULT (HCC): ICD-10-CM

## 2019-08-16 DIAGNOSIS — L03.031 CELLULITIS OF TOE OF RIGHT FOOT: ICD-10-CM

## 2019-08-16 PROCEDURE — 99204 OFFICE O/P NEW MOD 45 MIN: CPT | Performed by: PHYSICIAN ASSISTANT

## 2019-08-16 RX ORDER — SULFAMETHOXAZOLE AND TRIMETHOPRIM 800; 160 MG/1; MG/1
1 TABLET ORAL 2 TIMES DAILY
Qty: 20 TAB | Refills: 0 | Status: SHIPPED | OUTPATIENT
Start: 2019-08-16 | End: 2019-08-26

## 2019-08-16 NOTE — PROGRESS NOTES
Chief Complaint   Patient presents with   • Foot Pain       HISTORY OF PRESENT ILLNESS: Patient is a 44 y.o. male who presents today because a cow stepped on his right foot 2 days ago.  He has pain and swelling and bruising and this morning developed a blister on his second digit that is oozing thick yellow fluid.  He has not been taking any medications for it or putting anything on it.    Patient Active Problem List    Diagnosis Date Noted   • Morbid obesity ([facility]) 06/24/2017     Priority: Medium   • VT (ventricular tachycardia) ([facility]) 06/23/2017     Priority: Medium   • DARIO (obstructive sleep apnea) 06/23/2017     Priority: Medium   • HTN (hypertension) 12/02/2010     Priority: Medium   • Abscess 12/18/2017     Priority: Low   • Polycythemia 06/23/2017     Priority: Low   • Other ventral hernia without mention of obstruction or gangrene 02/08/2017     Priority: Low   • GOUT 12/02/2010     Priority: Low   • Morbid obesity with BMI of 50.0-59.9, adult ([facility]) 08/16/2019       Allergies:Morphine and Pcn [penicillins]    Current Outpatient Medications Ordered in Epic   Medication Sig Dispense Refill   • sulfamethoxazole-trimethoprim (BACTRIM DS) 800-160 MG tablet Take 1 Tab by mouth 2 times a day for 10 days. 20 Tab 0   • flecainide (TAMBOCOR) 100 MG Tab Take 1 Tab by mouth 2 Times a Day. 60 Tab 0   • metoprolol (LOPRESSOR) 25 MG Tab Take 1 Tab by mouth 2 Times a Day. 60 Tab 0   • ibuprofen (MOTRIN) 200 MG Tab Take 600-800 mg by mouth every 8 hours as needed.     • spironolactone (ALDACTONE) 25 MG Tab Take 1 Tab by mouth every day. 30 Tab 1   • allopurinol (ZYLOPRIM) 300 MG Tab Take 300 mg by mouth every day.     • losartan (COZAAR) 100 MG Tab Take 100 mg by mouth every day.     • furosemide (LASIX) 40 MG Tab Take 40 mg by mouth 2 Times a Day.       No current [facility]-ordered facility-administered medications on file.        Past Medical History:   Diagnosis Date   • Arthritis     joints   • GOUT 12/2/2010   • Hypertension   "  • Sleep apnea    • Ventricular tachycardia (HCC)        Social History     Tobacco Use   • Smoking status: Never Smoker   • Smokeless tobacco: Current User     Types: Chew   Substance Use Topics   • Alcohol use: Yes     Alcohol/week: 3.0 oz     Types: 6 Cans of beer per week     Comment: 2 per week   • Drug use: No       Family Status   Relation Name Status   • Mo     • MGMo  Alive   • MGFa     • Fa  Alive   • Bro  Alive   • PGMo  Alive   • PGFa       Family History   Problem Relation Age of Onset   • Heart Disease Mother 55        MI   • Lung Disease Maternal Grandmother         COPD   • Heart Disease Maternal Grandfather         MI   • Sleep Apnea Brother        ROS:  Review of Systems   Constitutional: Negative for fever, chills, weight loss and malaise/fatigue.   HENT: Negative for ear pain, nosebleeds, congestion, sore throat and neck pain.    Eyes: Negative for blurred vision.   Respiratory: Negative for cough, sputum production, shortness of breath and wheezing.    Cardiovascular: Negative for chest pain, palpitations, orthopnea and leg swelling.   Gastrointestinal: Negative for heartburn, nausea, vomiting and abdominal pain.   Genitourinary: Negative for dysuria, urgency and frequency.     Exam:  /92 (BP Location: Right arm, Patient Position: Sitting, BP Cuff Size: Large adult)   Pulse 68   Temp 36.3 °C (97.3 °F) (Temporal)   Resp 16   Ht 1.905 m (6' 3\")   Wt (!) 193.7 kg (427 lb)   SpO2 94%   General:  Well nourished, well developed male in NAD  Head:Normocephalic, atraumatic  Eyes: PERRLA, EOM within normal limits, no conjunctival injection, no scleral icterus, visual fields and acuity grossly intact.  Nose: Symmetrical without tenderness, no discharge.  Mouth: reasonable hygiene, no erythema exudates or tonsillar enlargement.  Neck: no masses, range of motion within normal limits, no tracheal deviation. No obvious thyroid enlargement.  Extremities: Right foot has " ecchymosis between the second and fourth digits at the MTP joint that extends into the interphalangeal joint.  The second digit has circumferential erythema and swelling and tenderness with blister formation and thick yellow fluid    Please note that this dictation was created using voice recognition software. I have made every reasonable attempt to correct obvious errors, but I expect that there are errors of grammar and possibly content that I did not discover before finalizing the note.    Assessment/Plan:  1. Injury of right foot, initial encounter  DX-FOOT-COMPLETE 3+ RIGHT   2. Cellulitis of toe of right foot  sulfamethoxazole-trimethoprim (BACTRIM DS) 800-160 MG tablet   3. Morbid obesity with BMI of 50.0-59.9, adult (HCC)  Patient identified as having weight management issue.  Appropriate orders and counseling given.   Wound care instructions were given, wound was cleaned and dressed in the office    Followup with primary care in the next 7-10 days if not significantly improving, return to the urgent care or go to the emergency room sooner for any worsening of symptoms.

## 2020-04-04 ENCOUNTER — HOSPITAL ENCOUNTER (OUTPATIENT)
Dept: LAB | Facility: MEDICAL CENTER | Age: 45
End: 2020-04-04
Attending: INTERNAL MEDICINE
Payer: COMMERCIAL

## 2020-04-04 LAB
ALBUMIN SERPL BCP-MCNC: 4.3 G/DL (ref 3.2–4.9)
ALBUMIN/GLOB SERPL: 1.6 G/DL
ALP SERPL-CCNC: 90 U/L (ref 30–99)
ALT SERPL-CCNC: 18 U/L (ref 2–50)
ANION GAP SERPL CALC-SCNC: 10 MMOL/L (ref 7–16)
AST SERPL-CCNC: 17 U/L (ref 12–45)
BILIRUB SERPL-MCNC: 0.6 MG/DL (ref 0.1–1.5)
BUN SERPL-MCNC: 19 MG/DL (ref 8–22)
CALCIUM SERPL-MCNC: 9.2 MG/DL (ref 8.5–10.5)
CHLORIDE SERPL-SCNC: 106 MMOL/L (ref 96–112)
CHOLEST SERPL-MCNC: 173 MG/DL (ref 100–199)
CO2 SERPL-SCNC: 27 MMOL/L (ref 20–33)
CREAT SERPL-MCNC: 0.8 MG/DL (ref 0.5–1.4)
EST. AVERAGE GLUCOSE BLD GHB EST-MCNC: 108 MG/DL
FASTING STATUS PATIENT QL REPORTED: NORMAL
GLOBULIN SER CALC-MCNC: 2.7 G/DL (ref 1.9–3.5)
GLUCOSE SERPL-MCNC: 107 MG/DL (ref 65–99)
HBA1C MFR BLD: 5.4 % (ref 0–5.6)
HDLC SERPL-MCNC: 51 MG/DL
LDLC SERPL CALC-MCNC: 110 MG/DL
POTASSIUM SERPL-SCNC: 4.5 MMOL/L (ref 3.6–5.5)
PROT SERPL-MCNC: 7 G/DL (ref 6–8.2)
SODIUM SERPL-SCNC: 143 MMOL/L (ref 135–145)
TRIGL SERPL-MCNC: 62 MG/DL (ref 0–149)

## 2020-04-04 PROCEDURE — 83036 HEMOGLOBIN GLYCOSYLATED A1C: CPT

## 2020-04-04 PROCEDURE — 36415 COLL VENOUS BLD VENIPUNCTURE: CPT

## 2020-04-04 PROCEDURE — 80061 LIPID PANEL: CPT

## 2020-04-04 PROCEDURE — 80053 COMPREHEN METABOLIC PANEL: CPT

## (undated) DEVICE — KIT ROOM DECONTAMINATION

## (undated) DEVICE — DRAPE LARGE 3 QUARTER - (20/CA)

## (undated) DEVICE — SENSOR SPO2 NEO LNCS ADHESIVE (20/BX) SEE USER NOTES

## (undated) DEVICE — TUBE E-T HI-LO CUFF 7.5MM (10EA/PK)

## (undated) DEVICE — DRAPE LAPAROTOMY T SHEET - (12EA/CA)

## (undated) DEVICE — MASK ANESTHESIA ADULT  - (100/CA)

## (undated) DEVICE — LACTATED RINGERS INJ 1000 ML - (14EA/CA 60CA/PF)

## (undated) DEVICE — TUBING CLEARLINK DUO-VENT - C-FLO (48EA/CA)

## (undated) DEVICE — CANISTER SUCTION 3000ML MECHANICAL FILTER AUTO SHUTOFF MEDI-VAC NONSTERILE LF DISP  (40EA/CA)

## (undated) DEVICE — NEPTUNE 4 PORT MANIFOLD - (20/PK)

## (undated) DEVICE — SUTURE 3-0 VICRYL PLUS SH - 8X 18 INCH (12/BX)

## (undated) DEVICE — SET EXTENSION WITH 2 PORTS (48EA/CA) ***PART #2C8610 IS A SUBSTITUTE*****

## (undated) DEVICE — SODIUM CHL IRRIGATION 0.9% 1000ML (12EA/CA)

## (undated) DEVICE — SYRINGE 10 ML CONTROL LL (25EA/BX 4BX/CA)

## (undated) DEVICE — SWAB ANAEROBIC SPEC.COLLECTOR - (25/PK 4PK/CA 100EA/CA)

## (undated) DEVICE — DRESSING ABDOMINAL PAD STERILE 8 X 10" (360EA/CA)"

## (undated) DEVICE — LEGGING LITHOTOMY 31 X 48 IN - (2EA/PK 20PK/CA)

## (undated) DEVICE — Device

## (undated) DEVICE — KIT ANESTHESIA W/CIRCUIT & 3/LT BAG W/FILTER (20EA/CA)

## (undated) DEVICE — HEAD HOLDER JUNIOR/ADULT

## (undated) DEVICE — ELECTRODE 850 FOAM ADHESIVE - HYDROGEL RADIOTRNSPRNT (50/PK)

## (undated) DEVICE — SET LEADWIRE 5 LEAD BEDSIDE DISPOSABLE ECG (1SET OF 5/EA)

## (undated) DEVICE — GOWN WARMING STANDARD FLEX - (30/CA)

## (undated) DEVICE — PACK MINOR BASIN - (2EA/CA)

## (undated) DEVICE — DRAPE UNDER BUTTOCK LRG (40/CA)

## (undated) DEVICE — GLOVE BIOGEL SZ 7.5 SURGICAL PF LTX - (50PR/BX 4BX/CA)

## (undated) DEVICE — GLOVE BIOGEL INDICATOR SZ 7SURGICAL PF LTX - (50/BX 4BX/CA)

## (undated) DEVICE — SUTURE 3-0 VICRYL PLUS - 12 X 18 INCH (12/BX)

## (undated) DEVICE — SUCTION INSTRUMENT YANKAUER BULBOUS TIP W/O VENT (50EA/CA)

## (undated) DEVICE — GLOVE BIOGEL PI INDICATOR SZ 7.0 SURGICAL PF LF - (50/BX 4BX/CA)

## (undated) DEVICE — SUTURE GENERAL

## (undated) DEVICE — LIGASURE TISSUE FUSION  - SINGLE USE (6/CA)

## (undated) DEVICE — GLOVE SZ 6.5 BIOGEL PI MICRO - PF LF (50PR/BX)

## (undated) DEVICE — LEAD SET 6 DISP. EKG NIHON KOHDEN

## (undated) DEVICE — PROTECTOR ULNA NERVE - (36PR/CA)

## (undated) DEVICE — CHLORAPREP 26 ML APPLICATOR - ORANGE TINT(25/CA)

## (undated) DEVICE — TRAY SRGPRP PVP IOD WT PRP - (20/CA)

## (undated) DEVICE — MASK, LARYNGEAL AIRWAY #5

## (undated) DEVICE — SUTURE 4-0 MONOCRYL PLUS PS-1 - 27 INCH (36/BX)

## (undated) DEVICE — GLOVE BIOGEL INDICATOR SZ 7.5 SURGICAL PF LTX - (50PR/BX 4BX/CA)

## (undated) DEVICE — SLEEVE, VASO, THIGH, MED

## (undated) DEVICE — BLADE SURGICAL #15 - (50/BX 3BX/CA)

## (undated) DEVICE — PAD LAP STERILE 18 X 18 - (5/PK 40PK/CA)

## (undated) DEVICE — SUTURE 2-0 VICRYL PLUS CT-1 - 8 X 18 INCH(12/BX)

## (undated) DEVICE — TUBE, CULTURE AEROBIC

## (undated) DEVICE — SUTURE 4-0 MONOCRYL PLUSPC-3 - 18 INCH (12/BX)

## (undated) DEVICE — ELECTRODE DUAL RETURN W/ CORD - (50/PK)

## (undated) DEVICE — PACK MAJOR BASIN - (2EA/CA)

## (undated) DEVICE — SPONGE GAUZESTER 4 X 4 4PLY - (128PK/CA)

## (undated) DEVICE — SPONGE KITTNER DISSECTORS - (5EA/PK 50PK/CA)

## (undated) DEVICE — GLOVE BIOGEL SZ 6.5 SURGICAL PF LTX (50PR/BX 4BX/CA)

## (undated) DEVICE — GLOVE BIOGEL ECLIPSE  PF LATEX SIZE 6.5 (50PR/BX)

## (undated) DEVICE — TOWELS CLOTH SURGICAL - (4/PK 20PK/CA)

## (undated) DEVICE — BANDAGE ROLL STERILE BULKEE 4.5 IN X 4 YD (100EA/CA)

## (undated) DEVICE — SPONGE RADIOPAQUE CTN X-LG - STERILE (50PK/CA) MADE TO ORDER ITEM AND HAS A 4-6 WEEK LEAD TIME